# Patient Record
Sex: FEMALE | URBAN - METROPOLITAN AREA
[De-identification: names, ages, dates, MRNs, and addresses within clinical notes are randomized per-mention and may not be internally consistent; named-entity substitution may affect disease eponyms.]

---

## 2019-06-24 NOTE — Clinical Note
Doing okay on Tamoxifen. Notes intermitt right hip discomfort only. Tapering Carbamazepine on her own, has not made Neuro appointment yet, working on financial assistance. Has lost weight. Takes powder Zeolite, looks okay, but will let you decide if you want her taking this. Sees you next week, thanks Pre-Surgery Instructions:   Medication Instructions    acetaminophen (TYLENOL) 325 mg tablet Instructed patient per Anesthesia Guidelines   Multiple Vitamin (MULTIVITAMIN) tablet Instructed patient per Anesthesia Guidelines   Saw Palmetto, Serenoa repens, (SAW PALMETTO PO) Instructed patient per Anesthesia Guidelines  Per anesthesia patient was instructed to stop NSAIDS and supplements one week preop and on DOS no medications are needed  Patient is currently in [place] and will have pre-op testing done on his return to the [facility]  Next week

## 2023-04-12 ENCOUNTER — LAB REQUISITION (OUTPATIENT)
Dept: LAB | Facility: HOSPITAL | Age: 48
End: 2023-04-12
Payer: COMMERCIAL

## 2023-04-12 DIAGNOSIS — I10 ESSENTIAL (PRIMARY) HYPERTENSION: ICD-10-CM

## 2023-04-12 DIAGNOSIS — Z00.00 ENCOUNTER FOR GENERAL ADULT MEDICAL EXAMINATION WITHOUT ABNORMAL FINDINGS: ICD-10-CM

## 2023-04-12 DIAGNOSIS — R76.0 RAISED ANTIBODY TITER: ICD-10-CM

## 2023-04-12 DIAGNOSIS — E66.09 OTHER OBESITY DUE TO EXCESS CALORIES: ICD-10-CM

## 2023-04-12 DIAGNOSIS — Z79.899 OTHER LONG TERM (CURRENT) DRUG THERAPY: ICD-10-CM

## 2023-04-12 LAB
ALBUMIN SERPL-MCNC: 3.9 G/DL (ref 3.4–5)
ALBUMIN/GLOB SERPL: 1 {RATIO} (ref 1–2)
ALP LIVER SERPL-CCNC: 63 U/L
ALT SERPL-CCNC: 22 U/L
ANION GAP SERPL CALC-SCNC: 8 MMOL/L (ref 0–18)
AST SERPL-CCNC: 10 U/L (ref 15–37)
BASOPHILS # BLD AUTO: 0.04 X10(3) UL (ref 0–0.2)
BASOPHILS NFR BLD AUTO: 0.6 %
BILIRUB SERPL-MCNC: 0.5 MG/DL (ref 0.1–2)
BUN BLD-MCNC: 9 MG/DL (ref 7–18)
BUN/CREAT SERPL: 13.2 (ref 10–20)
CALCIUM BLD-MCNC: 9.2 MG/DL (ref 8.5–10.1)
CHLORIDE SERPL-SCNC: 102 MMOL/L (ref 98–112)
CHOLEST SERPL-MCNC: 238 MG/DL (ref ?–200)
CO2 SERPL-SCNC: 26 MMOL/L (ref 21–32)
CREAT BLD-MCNC: 0.68 MG/DL
DEPRECATED RDW RBC AUTO: 43.4 FL (ref 35.1–46.3)
EOSINOPHIL # BLD AUTO: 0.16 X10(3) UL (ref 0–0.7)
EOSINOPHIL NFR BLD AUTO: 2.5 %
ERYTHROCYTE [DISTWIDTH] IN BLOOD BY AUTOMATED COUNT: 12 % (ref 11–15)
ESTRADIOL SERPL-MCNC: 236.6 PG/ML
FSH SERPL-ACNC: 2.5 MIU/ML
GFR SERPLBLD BASED ON 1.73 SQ M-ARVRAT: 109 ML/MIN/1.73M2 (ref 60–?)
GLOBULIN PLAS-MCNC: 4.1 G/DL (ref 2.8–4.4)
GLUCOSE BLD-MCNC: 90 MG/DL (ref 70–99)
HCT VFR BLD AUTO: 40.8 %
HDLC SERPL-MCNC: 78 MG/DL (ref 40–59)
HGB BLD-MCNC: 13.2 G/DL
IMM GRANULOCYTES # BLD AUTO: 0.02 X10(3) UL (ref 0–1)
IMM GRANULOCYTES NFR BLD: 0.3 %
LDLC SERPL CALC-MCNC: 144 MG/DL (ref ?–100)
LH SERPL-ACNC: 3.1 MIU/ML
LYMPHOCYTES # BLD AUTO: 1.76 X10(3) UL (ref 1–4)
LYMPHOCYTES NFR BLD AUTO: 27.5 %
MCH RBC QN AUTO: 31.5 PG (ref 26–34)
MCHC RBC AUTO-ENTMCNC: 32.4 G/DL (ref 31–37)
MCV RBC AUTO: 97.4 FL
MONOCYTES # BLD AUTO: 0.47 X10(3) UL (ref 0.1–1)
MONOCYTES NFR BLD AUTO: 7.3 %
NEUTROPHILS # BLD AUTO: 3.95 X10 (3) UL (ref 1.5–7.7)
NEUTROPHILS # BLD AUTO: 3.95 X10(3) UL (ref 1.5–7.7)
NEUTROPHILS NFR BLD AUTO: 61.8 %
NONHDLC SERPL-MCNC: 160 MG/DL (ref ?–130)
OSMOLALITY SERPL CALC.SUM OF ELEC: 280 MOSM/KG (ref 275–295)
PLATELET # BLD AUTO: 337 10(3)UL (ref 150–450)
POTASSIUM SERPL-SCNC: 3.7 MMOL/L (ref 3.5–5.1)
PROT SERPL-MCNC: 8 G/DL (ref 6.4–8.2)
RBC # BLD AUTO: 4.19 X10(6)UL
RHEUMATOID FACT SERPL-ACNC: <10 IU/ML (ref ?–15)
SODIUM SERPL-SCNC: 136 MMOL/L (ref 136–145)
TRIGL SERPL-MCNC: 95 MG/DL (ref 30–149)
TSI SER-ACNC: 1.73 MIU/ML (ref 0.36–3.74)
VLDLC SERPL CALC-MCNC: 18 MG/DL (ref 0–30)
WBC # BLD AUTO: 6.4 X10(3) UL (ref 4–11)

## 2023-04-12 PROCEDURE — 83001 ASSAY OF GONADOTROPIN (FSH): CPT | Performed by: FAMILY MEDICINE

## 2023-04-12 PROCEDURE — 82670 ASSAY OF TOTAL ESTRADIOL: CPT | Performed by: FAMILY MEDICINE

## 2023-04-12 PROCEDURE — 80053 COMPREHEN METABOLIC PANEL: CPT | Performed by: FAMILY MEDICINE

## 2023-04-12 PROCEDURE — 85025 COMPLETE CBC W/AUTO DIFF WBC: CPT | Performed by: FAMILY MEDICINE

## 2023-04-12 PROCEDURE — 86431 RHEUMATOID FACTOR QUANT: CPT | Performed by: FAMILY MEDICINE

## 2023-04-12 PROCEDURE — 83002 ASSAY OF GONADOTROPIN (LH): CPT | Performed by: FAMILY MEDICINE

## 2023-04-12 PROCEDURE — 84443 ASSAY THYROID STIM HORMONE: CPT | Performed by: FAMILY MEDICINE

## 2023-04-12 PROCEDURE — 80061 LIPID PANEL: CPT | Performed by: FAMILY MEDICINE

## 2023-06-21 ENCOUNTER — LAB REQUISITION (OUTPATIENT)
Dept: LAB | Facility: HOSPITAL | Age: 48
End: 2023-06-21
Payer: COMMERCIAL

## 2023-06-21 DIAGNOSIS — M25.572 PAIN IN LEFT ANKLE AND JOINTS OF LEFT FOOT: ICD-10-CM

## 2023-06-21 DIAGNOSIS — R60.0 LOCALIZED EDEMA: ICD-10-CM

## 2023-06-21 LAB — URATE SERPL-MCNC: 5.2 MG/DL

## 2023-06-21 PROCEDURE — 84550 ASSAY OF BLOOD/URIC ACID: CPT | Performed by: FAMILY MEDICINE

## 2023-09-07 ENCOUNTER — HOSPITAL ENCOUNTER (OUTPATIENT)
Dept: MAMMOGRAPHY | Age: 48
Discharge: HOME | End: 2023-09-07
Attending: FAMILY MEDICINE
Payer: COMMERCIAL

## 2023-09-07 DIAGNOSIS — Z12.31 BREAST CANCER SCREENING BY MAMMOGRAM: ICD-10-CM

## 2023-09-07 PROCEDURE — 77067 SCR MAMMO BI INCL CAD: CPT | Performed by: FAMILY MEDICINE

## 2023-09-07 PROCEDURE — 77063 BREAST TOMOSYNTHESIS BI: CPT | Performed by: FAMILY MEDICINE

## 2023-09-15 ENCOUNTER — HOSPITAL ENCOUNTER (OUTPATIENT)
Dept: ULTRASOUND IMAGING | Facility: HOSPITAL | Age: 48
Discharge: HOME | End: 2023-09-15
Attending: FAMILY MEDICINE
Payer: COMMERCIAL

## 2023-09-15 ENCOUNTER — HOSPITAL ENCOUNTER (OUTPATIENT)
Dept: MAMMOGRAPHY | Facility: HOSPITAL | Age: 48
Discharge: HOME | End: 2023-09-15
Attending: FAMILY MEDICINE
Payer: COMMERCIAL

## 2023-09-15 DIAGNOSIS — R92.8 ABNORMAL MAMMOGRAM: ICD-10-CM

## 2023-09-15 PROCEDURE — 77061 BREAST TOMOSYNTHESIS UNI: CPT | Performed by: FAMILY MEDICINE

## 2023-09-15 PROCEDURE — 76642 ULTRASOUND BREAST LIMITED: CPT | Performed by: FAMILY MEDICINE

## 2023-09-15 PROCEDURE — 77065 DX MAMMO INCL CAD UNI: CPT | Performed by: FAMILY MEDICINE

## 2023-09-21 NOTE — DISCHARGE INSTRUCTIONS
The Doctor (Radiologist) who performed your procedure was: DR. Shannan Campbell an ice pack over the biopsy site on top of your bra or on top of the ACE wrap (never apply ice directly over skin) for 10-15 minutes of every hour until bedtime for your comfort and to decrease bleeding. Keep your sports bra or the ACE wrap (stereotactic and MRI biopsy) in place for 24 hours after your biopsy. This compression decreases bleeding and breast movement for your comfort. Wear a supportive bra for the next couple of days for comfort (sports bra for sleep). Continue to wear, preferably, a sports bra or good supportive bra for 1 week and take off only to shower. No baths or showers during the first 24 hours after biopsy. After this time you may take a shower. It's okay if the strips get wet but do not soak them. NO saunas, hot tubs or swimming until steri-strips fall off (approx. 5 days). This prevents infection and allows time for them to completely close and heal.  DO NOT remove the steri-strips. They will fall off in 5 days. If any type of irritation (redness, itching or blisters) develops in the area around the steri-strips, remove them gently. If the steri-strips do not fall off after 5 days, gently remove them. Keep the area clean and dry. It is normal to have mild discomfort and bruising at the biopsy site. You may take Tylenol as needed for discomfort, as long as you have no allergies to Tylenol. Do not take aspirin, motrin, ibuprofen or any medication containing NSAID (non-steroidal anti-inflammatory drug) product for 48 hours. DO NOT participate in strenuous activity (aerobics, heavy lifting, housework, gardening, etc.) 48 hours after your biopsy to prevent bleeding. You will receive results in 2-3 business days. If you are having an MRI breast biopsy or an Ultrasound guided breast biopsy, you will be billed for the biopsy and unilateral mammogram separately.   If you have any questions about the procedure or your results, please contact the Breast Care Coordinator Nurse at [phone]. Notify your ordering physician or primary physician for increased bleeding, pain or fever over 100. Or contact a Radiology Nurse at [phone] between 8am-4pm (after 4pm, your call will be directed to the [place] [address] Emergency Room).

## 2023-09-22 ENCOUNTER — HOSPITAL ENCOUNTER (OUTPATIENT)
Dept: ULTRASOUND IMAGING | Facility: HOSPITAL | Age: 48
Discharge: HOME | End: 2023-09-22
Attending: FAMILY MEDICINE
Payer: COMMERCIAL

## 2023-09-22 ENCOUNTER — HOSPITAL ENCOUNTER (OUTPATIENT)
Dept: MAMMOGRAPHY | Facility: HOSPITAL | Age: 48
Discharge: HOME | End: 2023-09-22
Attending: FAMILY MEDICINE
Payer: COMMERCIAL

## 2023-09-22 DIAGNOSIS — R59.9 ENLARGED LYMPH NODE: ICD-10-CM

## 2023-09-22 DIAGNOSIS — N63.20 BREAST MASS, LEFT: ICD-10-CM

## 2023-09-22 PROCEDURE — 77065 DX MAMMO INCL CAD UNI: CPT | Performed by: FAMILY MEDICINE

## 2023-09-22 PROCEDURE — 76942 ECHO GUIDE FOR BIOPSY: CPT | Performed by: FAMILY MEDICINE

## 2023-09-22 PROCEDURE — 38505 NEEDLE BIOPSY LYMPH NODES: CPT | Performed by: FAMILY MEDICINE

## 2023-09-22 PROCEDURE — 88342 IMHCHEM/IMCYTCHM 1ST ANTB: CPT | Performed by: FAMILY MEDICINE

## 2023-09-22 PROCEDURE — 88305 TISSUE EXAM BY PATHOLOGIST: CPT | Performed by: FAMILY MEDICINE

## 2023-09-22 PROCEDURE — 19083 BX BREAST 1ST LESION US IMAG: CPT | Performed by: FAMILY MEDICINE

## 2023-09-22 PROCEDURE — 88360 TUMOR IMMUNOHISTOCHEM/MANUAL: CPT | Performed by: FAMILY MEDICINE

## 2023-09-22 NOTE — PROCEDURES
[facility]  Procedure Note    Fredick Link Patient Status:  Outpatient    1975 MRN [id number]   [facility] [address] [facility] PCP No primary care provider on file. Procedure: 1. Left breast ultrasound guided biopsy  2. Left axillary lymph node ultrasound guided biopsy    Pre-Procedure Diagnosis: 1. Left breast mass  2. Left axillary lymphadenopathy    Post-Procedure Diagnosis: 1. Left breast mass  2. Left axillary lymphadenopathy    Anesthesia:  Local    Findings:  multiple cores at each site    Specimens: minimal    Blood Loss:  none    Tourniquet Time: none  Complications:  None  Drains:  none      Queta Vickers MD  2023

## 2023-09-26 ENCOUNTER — TELEPHONE (OUTPATIENT)
Dept: HEMATOLOGY/ONCOLOGY | Facility: HOSPITAL | Age: 48
End: 2023-09-26

## 2023-09-26 NOTE — TELEPHONE ENCOUNTER
Patient is s/p left breast biopsy, performed at [facility] 9/22/23. This writer spoke with Chrissy Phipps. She has discussed results with patient and referred her to Dr. Magdalena Mccullough and Dr. Cain Franklin for further management. Phoned patient and introduced myself as Breast RN Navigator. Patient is aware of referrals to Dr. Cain Franklin and Dr. Magdalena Mccullough. Discussed with patient scheduling consultations. Offered patient appointment with Dr. Cain Franklin for Friday 9/29/23 at 2pm.  Patient agreeable. Offered patient appointment with Dr. Magdalena Mccullough for Wednesday 10/4/23 at 12pm.  Patient agreeable. All appointment information provided to patient. She acknowledged and denied questions.

## 2023-09-29 ENCOUNTER — OFFICE VISIT (OUTPATIENT)
Dept: HEMATOLOGY/ONCOLOGY | Facility: HOSPITAL | Age: 48
End: 2023-09-29
Attending: INTERNAL MEDICINE
Payer: COMMERCIAL

## 2023-09-29 VITALS
OXYGEN SATURATION: 98 % | SYSTOLIC BLOOD PRESSURE: 119 MMHG | BODY MASS INDEX: 34.81 KG/M2 | WEIGHT: 216.63 LBS | DIASTOLIC BLOOD PRESSURE: 54 MMHG | HEIGHT: 66 IN | TEMPERATURE: 99 F | RESPIRATION RATE: 16 BRPM | HEART RATE: 68 BPM

## 2023-09-29 DIAGNOSIS — C50.112 MALIGNANT NEOPLASM OF CENTRAL PORTION OF LEFT BREAST IN FEMALE, ESTROGEN RECEPTOR POSITIVE: Primary | ICD-10-CM

## 2023-09-29 DIAGNOSIS — Z17.0 MALIGNANT NEOPLASM OF CENTRAL PORTION OF LEFT BREAST IN FEMALE, ESTROGEN RECEPTOR POSITIVE: Primary | ICD-10-CM

## 2023-09-29 PROCEDURE — 99205 OFFICE O/P NEW HI 60 MIN: CPT | Performed by: INTERNAL MEDICINE

## 2023-09-29 RX ORDER — LOSARTAN POTASSIUM 50 MG/1
50 TABLET ORAL DAILY
COMMUNITY
Start: 2023-09-07

## 2023-09-29 RX ORDER — CARBAMAZEPINE 200 MG/1
200 TABLET ORAL 2 TIMES DAILY
COMMUNITY
Start: 2023-09-07

## 2023-10-03 ENCOUNTER — TELEPHONE (OUTPATIENT)
Dept: HEMATOLOGY/ONCOLOGY | Facility: HOSPITAL | Age: 48
End: 2023-10-03

## 2023-10-03 DIAGNOSIS — C77.3 BREAST CANCER METASTASIZED TO AXILLARY LYMPH NODE, LEFT (HCC): ICD-10-CM

## 2023-10-03 DIAGNOSIS — C50.912 BREAST CANCER METASTASIZED TO AXILLARY LYMPH NODE, LEFT (HCC): ICD-10-CM

## 2023-10-03 DIAGNOSIS — C50.112 MALIGNANT NEOPLASM OF CENTRAL PORTION OF LEFT BREAST IN FEMALE, ESTROGEN RECEPTOR POSITIVE: Primary | ICD-10-CM

## 2023-10-03 DIAGNOSIS — Z17.0 MALIGNANT NEOPLASM OF CENTRAL PORTION OF LEFT BREAST IN FEMALE, ESTROGEN RECEPTOR POSITIVE: Primary | ICD-10-CM

## 2023-10-03 NOTE — TELEPHONE ENCOUNTER
Second attempt to reach patient. Spoke with patient. Dr. Franklin Hamman office has contacted her and rescheduled her consultation to Wednesday 10/11/23. Shared with patient feedback from Dr. Keren Tran to have scans completed prior to her consultation. Scheduled CT and Bone Scan for Thursday 10/5/23. For convenience, rescheduled genetic counseling for Thursday 10/5/23 at 12pm to follow her scans. Also shared with patient Dr. Keren Tran has ordered a breast MRI. This has been scheduled for Monday 10/9/23 at 1pm.  All information provided to patient. Patient also aware information will be in her MyChart for her reference. Patient acknowledged and denied questions.

## 2023-10-04 ENCOUNTER — APPOINTMENT (OUTPATIENT)
Dept: GENETICS | Facility: HOSPITAL | Age: 48
End: 2023-10-04
Attending: INTERNAL MEDICINE
Payer: COMMERCIAL

## 2023-10-04 ENCOUNTER — APPOINTMENT (OUTPATIENT)
Dept: HEMATOLOGY/ONCOLOGY | Facility: HOSPITAL | Age: 48
End: 2023-10-04
Attending: INTERNAL MEDICINE
Payer: COMMERCIAL

## 2023-10-05 ENCOUNTER — GENETICS ENCOUNTER (OUTPATIENT)
Dept: GENETICS | Facility: HOSPITAL | Age: 48
End: 2023-10-05
Attending: INTERNAL MEDICINE
Payer: COMMERCIAL

## 2023-10-05 ENCOUNTER — NURSE ONLY (OUTPATIENT)
Dept: HEMATOLOGY/ONCOLOGY | Facility: HOSPITAL | Age: 48
End: 2023-10-05
Attending: INTERNAL MEDICINE
Payer: COMMERCIAL

## 2023-10-05 ENCOUNTER — HOSPITAL ENCOUNTER (OUTPATIENT)
Dept: NUCLEAR MEDICINE | Facility: HOSPITAL | Age: 48
Discharge: HOME | End: 2023-10-05
Attending: INTERNAL MEDICINE
Payer: COMMERCIAL

## 2023-10-05 ENCOUNTER — HOSPITAL ENCOUNTER (OUTPATIENT)
Dept: CT IMAGING | Facility: HOSPITAL | Age: 48
Discharge: HOME | End: 2023-10-05
Attending: INTERNAL MEDICINE
Payer: COMMERCIAL

## 2023-10-05 DIAGNOSIS — Z17.0 MALIGNANT NEOPLASM OF CENTRAL PORTION OF LEFT BREAST IN FEMALE, ESTROGEN RECEPTOR POSITIVE: ICD-10-CM

## 2023-10-05 DIAGNOSIS — Z80.3 FAMILY HISTORY OF MALIGNANT NEOPLASM OF BREAST: ICD-10-CM

## 2023-10-05 DIAGNOSIS — C50.112 MALIGNANT NEOPLASM OF CENTRAL PORTION OF LEFT BREAST IN FEMALE, ESTROGEN RECEPTOR POSITIVE: Primary | ICD-10-CM

## 2023-10-05 DIAGNOSIS — Z17.0 MALIGNANT NEOPLASM OF CENTRAL PORTION OF LEFT BREAST IN FEMALE, ESTROGEN RECEPTOR POSITIVE: Primary | ICD-10-CM

## 2023-10-05 DIAGNOSIS — C50.112 MALIGNANT NEOPLASM OF CENTRAL PORTION OF LEFT BREAST IN FEMALE, ESTROGEN RECEPTOR POSITIVE: ICD-10-CM

## 2023-10-05 LAB
CREAT BLD-MCNC: 0.7 MG/DL
EGFRCR SERPLBLD CKD-EPI 2021: 107 ML/MIN/1.73M2 (ref 60–?)

## 2023-10-05 PROCEDURE — 82565 ASSAY OF CREATININE: CPT

## 2023-10-05 PROCEDURE — 78306 BONE IMAGING WHOLE BODY: CPT | Performed by: INTERNAL MEDICINE

## 2023-10-05 PROCEDURE — 71260 CT THORAX DX C+: CPT | Performed by: INTERNAL MEDICINE

## 2023-10-05 PROCEDURE — 74177 CT ABD & PELVIS W/CONTRAST: CPT | Performed by: INTERNAL MEDICINE

## 2023-10-05 PROCEDURE — 36415 COLL VENOUS BLD VENIPUNCTURE: CPT

## 2023-10-05 PROCEDURE — 96040 HC GENETIC COUNSELING EA 30 MIN: CPT | Performed by: GENETIC COUNSELOR, MS

## 2023-10-09 ENCOUNTER — HOSPITAL ENCOUNTER (OUTPATIENT)
Dept: MRI IMAGING | Facility: HOSPITAL | Age: 48
Discharge: HOME | End: 2023-10-09
Attending: SURGERY
Payer: COMMERCIAL

## 2023-10-09 DIAGNOSIS — C50.112 MALIGNANT NEOPLASM OF CENTRAL PORTION OF LEFT BREAST IN FEMALE, ESTROGEN RECEPTOR POSITIVE: ICD-10-CM

## 2023-10-09 DIAGNOSIS — Z17.0 MALIGNANT NEOPLASM OF CENTRAL PORTION OF LEFT BREAST IN FEMALE, ESTROGEN RECEPTOR POSITIVE: ICD-10-CM

## 2023-10-09 DIAGNOSIS — C77.3 BREAST CANCER METASTASIZED TO AXILLARY LYMPH NODE, LEFT (HCC): ICD-10-CM

## 2023-10-09 DIAGNOSIS — C50.912 BREAST CANCER METASTASIZED TO AXILLARY LYMPH NODE, LEFT (HCC): ICD-10-CM

## 2023-10-09 PROCEDURE — A9575 INJ GADOTERATE MEGLUMI 0.1ML: HCPCS | Performed by: SURGERY

## 2023-10-09 PROCEDURE — 77049 MRI BREAST C-+ W/CAD BI: CPT | Performed by: SURGERY

## 2023-10-09 RX ORDER — GADOTERATE MEGLUMINE 376.9 MG/ML
20 INJECTION INTRAVENOUS
Status: COMPLETED | OUTPATIENT
Start: 2023-10-09 | End: 2023-10-09

## 2023-10-09 RX ADMIN — GADOTERATE MEGLUMINE 20 ML: 376.9 INJECTION INTRAVENOUS at 13:54:00

## 2023-10-11 ENCOUNTER — OFFICE VISIT (OUTPATIENT)
Dept: SURGERY | Facility: CLINIC | Age: 48
End: 2023-10-11
Payer: COMMERCIAL

## 2023-10-11 VITALS
HEIGHT: 66 IN | TEMPERATURE: 97 F | SYSTOLIC BLOOD PRESSURE: 124 MMHG | WEIGHT: 213.81 LBS | OXYGEN SATURATION: 2 % | RESPIRATION RATE: 16 BRPM | DIASTOLIC BLOOD PRESSURE: 69 MMHG | HEART RATE: 70 BPM | BODY MASS INDEX: 34.36 KG/M2

## 2023-10-11 DIAGNOSIS — C50.112 MALIGNANT NEOPLASM OF CENTRAL PORTION OF LEFT BREAST IN FEMALE, ESTROGEN RECEPTOR POSITIVE: Primary | ICD-10-CM

## 2023-10-11 DIAGNOSIS — Z80.3 FAMILY HISTORY OF MALIGNANT NEOPLASM OF BREAST: ICD-10-CM

## 2023-10-11 DIAGNOSIS — Z17.0 MALIGNANT NEOPLASM OF CENTRAL PORTION OF LEFT BREAST IN FEMALE, ESTROGEN RECEPTOR POSITIVE: Primary | ICD-10-CM

## 2023-10-11 PROCEDURE — 3078F DIAST BP <80 MM HG: CPT | Performed by: SURGERY

## 2023-10-11 PROCEDURE — 99205 OFFICE O/P NEW HI 60 MIN: CPT | Performed by: SURGERY

## 2023-10-11 PROCEDURE — 3074F SYST BP LT 130 MM HG: CPT | Performed by: SURGERY

## 2023-10-11 PROCEDURE — 3008F BODY MASS INDEX DOCD: CPT | Performed by: SURGERY

## 2023-10-15 ENCOUNTER — TELEPHONE (OUTPATIENT)
Dept: GENETICS | Facility: HOSPITAL | Age: 48
End: 2023-10-15

## 2023-10-15 NOTE — TELEPHONE ENCOUNTER
Shared NEGATIVE genetic testing results for 9 gene breast-focused STAT panel with Katey Luther ([facility] Breast Cancer STAT Panel with STEPHANIE and CHEK2). She was pleased to hear these results. Released to her through lab's portal and will mail her a copy. She is considering a reflex for other genes but will make a decision about this following her surgery. All her questions were answered to the best of my ability and she was appreciative of the call.

## 2023-10-16 ENCOUNTER — TELEPHONE (OUTPATIENT)
Dept: SURGERY | Facility: CLINIC | Age: 48
End: 2023-10-16

## 2023-10-16 ENCOUNTER — DOCUMENTATION ONLY (OUTPATIENT)
Dept: SURGERY | Facility: CLINIC | Age: 48
End: 2023-10-16

## 2023-10-16 DIAGNOSIS — C50.112 MALIGNANT NEOPLASM OF CENTRAL PORTION OF LEFT BREAST IN FEMALE, ESTROGEN RECEPTOR POSITIVE: Primary | ICD-10-CM

## 2023-10-16 DIAGNOSIS — Z17.0 MALIGNANT NEOPLASM OF CENTRAL PORTION OF LEFT BREAST IN FEMALE, ESTROGEN RECEPTOR POSITIVE: Primary | ICD-10-CM

## 2023-10-16 NOTE — TELEPHONE ENCOUNTER
Spoke with patient regarding surgical decision between lumpectomy and mastectomy in light of breast MRI and genetics results. Patient would like to proceed with lumpectomy. Discussed axillary lymph node dissection with patient as well. Patient is aware that she will have surgical drain post operatively. Patient aware that the office will call her with surgical date and drain teaching class. Patient appreciative of the call.

## 2023-10-16 NOTE — PATIENT INSTRUCTIONS
Dr. Arcelia Matta  Tel: [phone]  Fax: [address] [place], [place] [address]  [phone]     Surgery/Procedure: Left breast wire localized lumpectomy, left breast axillary lymph node dissection       Anesthesia:   Gen  Surgery Length:   1.5 hours CPT:  53050, 28210   Wire LOC:   Yes Nuc Med:   No Jayla Seed:  No       Dx & ICD-10: Malignant neoplasm of central portion of left breast in female, estrogen receptor positive (C50.112, Z17.0). Radiology Instructions: Left breast, 12 o'clock position, 2 cm from the nipple, vision shaped clip, biopsy demonstrates invasive ductal carcinoma.     _______________________________________________________________________________    Someone must accompany you the day of the procedure to drive you home safely, because of anesthesia. You will need an adult  to stay with you the first night following your surgery. You must remove any kind of makeup, acrylic nails, lotions, powders, creams or deodorant. [place] ONLY: Pre-admission will give instruct you on when to take Gatorade and Tylenol/acetaminophen prior to your surgery, purchase 2 - 12oz bottles of regular Gatorade (NOT RED/SUGAR FREE). Otherwise, you may not eat or drink anything else after 11PM the night before surgery. [place] ONLY: You may not eat or drink anything after midnight the day of your surgery. Wear comfortable clothing that can be easily removed. If you wear dentures, contacts lenses, or any prosthesis, you will be asked to remove them. Do not drink alcohol or smoke 24 hours prior to your procedure. Bring a picture ID and your insurance card. Covid-19 testing is no longer required before surgery unless you are experiencing symptoms such as fever, cough, congestion, etc.   The Pre-Admission Testing Department will call the day before to confirm your procedure, give you the time you need to arrive by and directions on where to go.  They begin making calls after 2pm, if you are not contacted by 4pm, please call the surgeon's office listed above. Do not take any blood thinners at least one week prior to the procedure/surgery. This includes aspirin, baby aspirin, Ibuprofen products, herbal supplements, diet medications, vitamin E, fish oil and green tea supplements. Please check other supplements for these ingredients. *TYLENOL or acetaminophen is acceptable*  If you take Coumadin, Plavix, Xarelto, or Eliquis, please contact your prescribing physician for special instructions on how long to hold. If you take insulin contact your primary care physician for special instructions. Our surgery scheduler, Tomas Bansal, will be contacting you to discuss surgery dates. If you have any questions related to scheduling your surgery, please reach out to her at [phone].  _____________________________________________________________________  PRE-OPERATIVE TESTING IF INDICATED BELOW  PLEASE COMPLETE ASAP (AT LEAST 14-21 DAYS PRIOR TO SURGERY)  [] CBC [x] BMP [] CMP [x] EKG    [] PT, PTT, INR [] Cardiac Clearance  [x] H&P Medical Clearance [] Chest X-ray     Please call Central Scheduling to schedule an appointment for pre-operative labs/tests @ [phone]  Does the patient have a pacemaker or ICD? Does the patient have sleep apnea?   [] Yes   [x] No                               [] Yes   [x] No

## 2023-10-17 ENCOUNTER — TELEPHONE (OUTPATIENT)
Dept: SURGERY | Facility: CLINIC | Age: 48
End: 2023-10-17

## 2023-10-17 DIAGNOSIS — Z17.0 MALIGNANT NEOPLASM OF CENTRAL PORTION OF LEFT BREAST IN FEMALE, ESTROGEN RECEPTOR POSITIVE: Primary | ICD-10-CM

## 2023-10-17 DIAGNOSIS — C50.112 MALIGNANT NEOPLASM OF CENTRAL PORTION OF LEFT BREAST IN FEMALE, ESTROGEN RECEPTOR POSITIVE: Primary | ICD-10-CM

## 2023-10-17 NOTE — TELEPHONE ENCOUNTER
Calling pt in regards to scheduling surgery. Informed pt that I have 11/06/2023 available at [facility] with Dr. Maral Barrera. Pt verbalized understanding and in agreement with date and location. All questions answered. Encouraged pt to call or [facility] message office with any other questions or concerns.

## 2023-10-23 ENCOUNTER — LAB REQUISITION (OUTPATIENT)
Dept: LAB | Facility: HOSPITAL | Age: 48
End: 2023-10-23

## 2023-10-23 DIAGNOSIS — D05.81 OTHER SPECIFIED TYPE OF CARCINOMA IN SITU OF RIGHT BREAST: ICD-10-CM

## 2023-10-23 DIAGNOSIS — N92.5 OTHER SPECIFIED IRREGULAR MENSTRUATION: ICD-10-CM

## 2023-10-23 DIAGNOSIS — Z01.818 ENCOUNTER FOR OTHER PREPROCEDURAL EXAMINATION: ICD-10-CM

## 2023-10-23 LAB
ANION GAP SERPL CALC-SCNC: 8 MMOL/L (ref 0–18)
BUN BLD-MCNC: 8 MG/DL (ref 7–18)
BUN/CREAT SERPL: 10.5 (ref 10–20)
CALCIUM BLD-MCNC: 9.1 MG/DL (ref 8.5–10.1)
CHLORIDE SERPL-SCNC: 106 MMOL/L (ref 98–112)
CO2 SERPL-SCNC: 26 MMOL/L (ref 21–32)
CREAT BLD-MCNC: 0.76 MG/DL
EGFRCR SERPLBLD CKD-EPI 2021: 97 ML/MIN/1.73M2 (ref 60–?)
ESTRADIOL SERPL-MCNC: 26.7 PG/ML
FSH SERPL-ACNC: 13.5 MIU/ML
GLUCOSE BLD-MCNC: 93 MG/DL (ref 70–99)
LH SERPL-ACNC: 8.4 MIU/ML
OSMOLALITY SERPL CALC.SUM OF ELEC: 288 MOSM/KG (ref 275–295)
POTASSIUM SERPL-SCNC: 4.3 MMOL/L (ref 3.5–5.1)
SODIUM SERPL-SCNC: 140 MMOL/L (ref 136–145)

## 2023-10-23 PROCEDURE — 83001 ASSAY OF GONADOTROPIN (FSH): CPT | Performed by: FAMILY MEDICINE

## 2023-10-23 PROCEDURE — 80048 BASIC METABOLIC PNL TOTAL CA: CPT | Performed by: FAMILY MEDICINE

## 2023-10-23 PROCEDURE — 82670 ASSAY OF TOTAL ESTRADIOL: CPT | Performed by: FAMILY MEDICINE

## 2023-10-23 PROCEDURE — 83002 ASSAY OF GONADOTROPIN (LH): CPT | Performed by: FAMILY MEDICINE

## 2023-10-24 ENCOUNTER — TELEPHONE (OUTPATIENT)
Dept: HEMATOLOGY/ONCOLOGY | Facility: HOSPITAL | Age: 48
End: 2023-10-24

## 2023-10-24 NOTE — TELEPHONE ENCOUNTER
Phoned patient for care coordination. Patient is scheduled for left breast wire localized lumpectomy and axillary lymph node dissection. Discussed with patient scheduling for drain education and Physical Therapy assessment. Appointment for education scheduled for Monday Oct 30th at 2pm in the [facility]. Patient shares need for later afternoon appointments. Will reach out to Physical Therapy for appointment availability. Patient acknowledged and was appreciative of call.

## 2023-10-25 ENCOUNTER — TELEPHONE (OUTPATIENT)
Dept: HEMATOLOGY/ONCOLOGY | Facility: HOSPITAL | Age: 48
End: 2023-10-25

## 2023-10-25 ENCOUNTER — OFFICE VISIT (OUTPATIENT)
Dept: PHYSICAL THERAPY | Facility: HOSPITAL | Age: 48
End: 2023-10-25
Attending: FAMILY MEDICINE

## 2023-10-25 NOTE — PROGRESS NOTES
BREAST CANCER SURGICAL SCREENINGS  [facility]      PATIENT SUMMARY:     Involved Side:       Left                                              Dominant Hand:  right but uses both                                Occupation:                       Prior Level of Function:   no issues                                       Social Activities:                                                            Pertinent PMH:            HISTORY:  Past Medical History:   Diagnosis Date    Hypertension     Trigeminal neuralgia of left side of face       Past Surgical History:   Procedure Laterality Date    NEEDLE BIOPSY RIGHT Right     bx done at age 24yrs old          x2                                                                   PCP, Surgeon, and Oncologist:         Ramon Garcia                                                    Surgery Type and Date:           2023 L breast lumpectomy                                               # Nodes + / # Nodes Removed:      Left ALND                                                                   Chemotherapy and/or Radiation:       ? OBJECTIVE MEASUREMENTS:   10/25/2023  -discussed to start exercises 1 week after surgery.   Limit arm motion LUE to shoulder height when drains are in.                   Pre-surgical screening date: 10/25/2023    Post-surgical screening date:     Post-surgical screening date:      Pain scale:      Pain scale:      Pain scale:      Screening Therapist: DB    Screening Therapist:     Screening Therapist:                                                                                        Right Left    Right Left    Right Left   Shoulder  A/AAROM A/AAROM  Shoulder  A/AAROM A/AAROM  Shoulder  A/AAROM A/AAROM   Supine flex [facility] WFL  Supine flex    Supine flex      abd WFL WFL   abd     abd      ER     ER     ER      IR     IR     IR Sitting flex 160 160  Sitting flex    Sitting flex      abd WFL WFL   abd     abd      ext [facility]   ext     ext     Functional IR     Functional IR     Functional IR                      Shoulder strength  Right Left  Shoulder strength  Right Left  Shoulder strength  Right Left    flex 5 5   flex     flex      abd 5 5   abd     abd      ext 5 5   ext     ext      ER 5 5   ER     ER      IR 5 5   IR     IR                     Posture:     Posture:     Posture:                      Cording (grade 0-3):     Cording (grade 0-3):     Cording (grade 0-3):      R: 0    R:     R:      L: 0    L:     L:      Miscellaneous:      Miscellaneous:      Miscellaneous:                                                                       Any feelings of heaviness or tightness, swelling, redness, and/or heat in the at-risk arm, breast, chest, or truncal area? none    Any feelings of heaviness or tightness, swelling, redness, and/or heat in the at-risk arm, breast, chest, or truncal area? Any feelings of heaviness or tightness, swelling, redness, and/or heat in the at-risk arm, breast, chest, or truncal area?                                       Arm Volume     Arm Volume     Arm Volume              10/25/2023     3:00 PM   Lymphedema Calculations   DATE MEASURED 10/25/2023   LOCATION/MEASUREMENTS LUE   PATIENT POSITION  supine 1 pillow   LIMB POSITION 75 deg abduction   STARTING POINT 17cm from 3rd cuticle   RIGHT HAND VOLUME 21.7cm across palm   LEFT HAND VOLUME 21.7cm across palm   MEASUREMENT A 16.5   MEASUREMENT B 20.2   MEASUREMENT C 24.7   MEASUREMENT D 27.8   MEASUREMENT E 28.1   MEASUREMENT F 31   MEASUREMENT G 32.8   MEASUREMENT H 36   MEASUREMENT I 41.2    TOTAL VOLUME  2822.28252   DATE MEASURED 10/25/2023   LOCATION/MEASUREMENTS RUE   MEASUREMENT A 17   MEASUREMENT B 20.6   MEASUREMENT C 24.8   MEASUREMENT D 27.7   MEASUREMENT E 28.2   MEASUREMENT F 30.5   MEASUREMENT G 32.6   MEASUREMENT H 35.8   MEASUREMENT I 39. 8    TOTAL VOLUME  2705.27211   % DIFFERENCE 1.2091

## 2023-10-25 NOTE — TELEPHONE ENCOUNTER
Phoned patient regarding pre-surgery Physical Therapy evaluation. Offered appointment for today at 3pm.  Patient agreeable. All information provided. Patient acknowledged and denied questions.

## 2023-10-27 ENCOUNTER — TELEPHONE (OUTPATIENT)
Dept: SURGERY | Facility: CLINIC | Age: 48
End: 2023-10-27

## 2023-10-27 NOTE — TELEPHONE ENCOUNTER
Received pre op BMP and EKG from Dr Crocker Flight office, faxed to [facility]. Notified Dr Khalil Lizama office that H&P clearance note was not complete. Awaiting final H&P clearance note.

## 2023-10-30 ENCOUNTER — OFFICE VISIT (OUTPATIENT)
Dept: HEMATOLOGY/ONCOLOGY | Facility: HOSPITAL | Age: 48
End: 2023-10-30
Attending: INTERNAL MEDICINE
Payer: COMMERCIAL

## 2023-10-30 DIAGNOSIS — Z17.0 MALIGNANT NEOPLASM OF CENTRAL PORTION OF LEFT BREAST IN FEMALE, ESTROGEN RECEPTOR POSITIVE: Primary | ICD-10-CM

## 2023-10-30 DIAGNOSIS — Z71.9 ENCOUNTER FOR HEALTH EDUCATION: ICD-10-CM

## 2023-10-30 DIAGNOSIS — C50.112 MALIGNANT NEOPLASM OF CENTRAL PORTION OF LEFT BREAST IN FEMALE, ESTROGEN RECEPTOR POSITIVE: Primary | ICD-10-CM

## 2023-10-30 PROCEDURE — 99211 OFF/OP EST MAY X REQ PHY/QHP: CPT

## 2023-10-30 NOTE — PROGRESS NOTES
Patient presents to the [facility] for preoperative education. She is unaccompanied for her appointment today. Patient is scheduled on 11/6/23 for left breast wire localized lumpectomy and left axillary lymph node dissection with Dr. Maral Barrera. Pink compression bra demonstrated to patient. This included where she can adjust this for comfort as well as drain management system. Demonstrated to patient where she can expect her IRENE drain incision to be located. Showed to patient IRENE dressing of biopatch and tegaderm. Instructed patient to always have compression bra in place unless performing hygiene. Patient aware she will have a chest incision that will have a surgical dressing of gauze and ABD. Her incisions will be covered with steri strips. Instructed patient to leave steri strips in place. Educated as to what to expect from IRENE drain. This included color and volume of drainage, and how volume should slowly decrease and color change over time. Shared with patient to expect blood clots to also be present. Shared with patient not to expect each drain to produce equal amounts of drainage. Educated patient as to IRENE drain care. Care should be performed BID and prn dependent on volume of output. Patient should gather needed equipment to include measurement cups, alcohol swabs, drain record, and witting utensil. Instructed patient to perform hand hygiene prior to performing drain care. Demonstrated to patient stripping of tubing, emptying of drain, measurement of drainage and documentation. This skill was demonstrated back to this writer by patient successfully. Discussed troubleshooting the drain as well as when to call MD office. Provided patient with measurement cups, alcohol swabs, written IRENE drain instructions, video link, as well as drain record. Educated patient as to showering. Instructed patient she may shower 24-48 hrs after her surgery.   Encouraged patient to perform IRENE drain care prior to showering. Provided lanyard for management of drain while showering. Patient to remove compression bra and surgical dressing. Surgical dressing can be discarded. IRENE drain dressing should be examined and reinforced if needed. Instructed patient no scrubbing over her incision while in the shower. Once shower completed, she should pat dry. Compression bra needs to be reapplied as well as gauze dressing to her comfort. Provided patient with gauze, ABD and tegaderm dressings for home management. Patient has met with Physical Therapy and is aware of activity restrictions and arm exercise program.     Patient has follow up scheduled with Surgical Oncology. Emotional support provided to patient. Encouraged her to call with questions, concerns, or needs.

## 2023-11-02 ENCOUNTER — TELEPHONE (OUTPATIENT)
Dept: SURGERY | Facility: CLINIC | Age: 48
End: 2023-11-02

## 2023-11-02 NOTE — TELEPHONE ENCOUNTER
Contacted Dr Jonna Robledo office regarding H&P medical clearance note. Office will fax over complete note. Office fax number provided.

## 2023-11-06 ENCOUNTER — HOSPITAL ENCOUNTER (OUTPATIENT)
Dept: MAMMOGRAPHY | Facility: HOSPITAL | Age: 48
Discharge: HOME | End: 2023-11-06
Attending: SURGERY
Payer: COMMERCIAL

## 2023-11-06 ENCOUNTER — ANESTHESIA EVENT (OUTPATIENT)
Dept: SURGERY | Facility: HOSPITAL | Age: 48
End: 2023-11-06
Payer: COMMERCIAL

## 2023-11-06 ENCOUNTER — APPOINTMENT (OUTPATIENT)
Dept: MAMMOGRAPHY | Facility: HOSPITAL | Age: 48
End: 2023-11-06
Attending: SURGERY
Payer: COMMERCIAL

## 2023-11-06 ENCOUNTER — ANESTHESIA (OUTPATIENT)
Dept: SURGERY | Facility: HOSPITAL | Age: 48
End: 2023-11-06
Payer: COMMERCIAL

## 2023-11-06 ENCOUNTER — HOSPITAL ENCOUNTER (OUTPATIENT)
Facility: HOSPITAL | Age: 48
Setting detail: HOSPITAL OUTPATIENT SURGERY
Discharge: HOME | End: 2023-11-06
Attending: SURGERY | Admitting: SURGERY
Payer: COMMERCIAL

## 2023-11-06 VITALS
WEIGHT: 210 LBS | OXYGEN SATURATION: 98 % | HEIGHT: 66 IN | TEMPERATURE: 98 F | SYSTOLIC BLOOD PRESSURE: 127 MMHG | RESPIRATION RATE: 16 BRPM | DIASTOLIC BLOOD PRESSURE: 68 MMHG | BODY MASS INDEX: 33.75 KG/M2 | HEART RATE: 66 BPM

## 2023-11-06 DIAGNOSIS — Z17.0 MALIGNANT NEOPLASM OF CENTRAL PORTION OF LEFT BREAST IN FEMALE, ESTROGEN RECEPTOR POSITIVE: Primary | ICD-10-CM

## 2023-11-06 DIAGNOSIS — Z17.0 MALIGNANT NEOPLASM OF CENTRAL PORTION OF LEFT BREAST IN FEMALE, ESTROGEN RECEPTOR POSITIVE: ICD-10-CM

## 2023-11-06 DIAGNOSIS — C50.112 MALIGNANT NEOPLASM OF CENTRAL PORTION OF LEFT BREAST IN FEMALE, ESTROGEN RECEPTOR POSITIVE: ICD-10-CM

## 2023-11-06 DIAGNOSIS — C50.112 MALIGNANT NEOPLASM OF CENTRAL PORTION OF LEFT BREAST IN FEMALE, ESTROGEN RECEPTOR POSITIVE: Primary | ICD-10-CM

## 2023-11-06 LAB — B-HCG UR QL: NEGATIVE

## 2023-11-06 PROCEDURE — 0HBU0ZZ EXCISION OF LEFT BREAST, OPEN APPROACH: ICD-10-PCS | Performed by: SURGERY

## 2023-11-06 PROCEDURE — 0JX60ZB TRANSFER CHEST SUBCUTANEOUS TISSUE AND FASCIA WITH SKIN AND SUBCUTANEOUS TISSUE, OPEN APPROACH: ICD-10-PCS | Performed by: SURGERY

## 2023-11-06 PROCEDURE — 76098 X-RAY EXAM SURGICAL SPECIMEN: CPT | Performed by: SURGERY

## 2023-11-06 PROCEDURE — 81025 URINE PREGNANCY TEST: CPT

## 2023-11-06 PROCEDURE — 88300 SURGICAL PATH GROSS: CPT | Performed by: SURGERY

## 2023-11-06 PROCEDURE — 88360 TUMOR IMMUNOHISTOCHEM/MANUAL: CPT | Performed by: SURGERY

## 2023-11-06 PROCEDURE — 07B60ZX EXCISION OF LEFT AXILLARY LYMPHATIC, OPEN APPROACH, DIAGNOSTIC: ICD-10-PCS | Performed by: SURGERY

## 2023-11-06 PROCEDURE — 19281 PERQ DEVICE BREAST 1ST IMAG: CPT | Performed by: SURGERY

## 2023-11-06 PROCEDURE — 88307 TISSUE EXAM BY PATHOLOGIST: CPT | Performed by: SURGERY

## 2023-11-06 RX ORDER — EPHEDRINE SULFATE 50 MG/ML
INJECTION INTRAVENOUS AS NEEDED
Status: DISCONTINUED | OUTPATIENT
Start: 2023-11-06 | End: 2023-11-06 | Stop reason: SURG

## 2023-11-06 RX ORDER — ONDANSETRON 2 MG/ML
4 INJECTION INTRAMUSCULAR; INTRAVENOUS EVERY 6 HOURS PRN
Status: DISCONTINUED | OUTPATIENT
Start: 2023-11-06 | End: 2023-11-06

## 2023-11-06 RX ORDER — NALOXONE HYDROCHLORIDE 0.4 MG/ML
0.08 INJECTION, SOLUTION INTRAMUSCULAR; INTRAVENOUS; SUBCUTANEOUS AS NEEDED
Status: DISCONTINUED | OUTPATIENT
Start: 2023-11-06 | End: 2023-11-06

## 2023-11-06 RX ORDER — MIDAZOLAM HYDROCHLORIDE 1 MG/ML
INJECTION INTRAMUSCULAR; INTRAVENOUS AS NEEDED
Status: DISCONTINUED | OUTPATIENT
Start: 2023-11-06 | End: 2023-11-06 | Stop reason: SURG

## 2023-11-06 RX ORDER — MORPHINE SULFATE 4 MG/ML
2 INJECTION, SOLUTION INTRAMUSCULAR; INTRAVENOUS EVERY 10 MIN PRN
Status: DISCONTINUED | OUTPATIENT
Start: 2023-11-06 | End: 2023-11-06

## 2023-11-06 RX ORDER — LABETALOL HYDROCHLORIDE 5 MG/ML
5 INJECTION, SOLUTION INTRAVENOUS EVERY 5 MIN PRN
Status: DISCONTINUED | OUTPATIENT
Start: 2023-11-06 | End: 2023-11-06

## 2023-11-06 RX ORDER — LIDOCAINE HYDROCHLORIDE 10 MG/ML
INJECTION, SOLUTION EPIDURAL; INFILTRATION; INTRACAUDAL; PERINEURAL AS NEEDED
Status: DISCONTINUED | OUTPATIENT
Start: 2023-11-06 | End: 2023-11-06 | Stop reason: SURG

## 2023-11-06 RX ORDER — HYDROMORPHONE HYDROCHLORIDE 1 MG/ML
0.2 INJECTION, SOLUTION INTRAMUSCULAR; INTRAVENOUS; SUBCUTANEOUS EVERY 5 MIN PRN
Status: DISCONTINUED | OUTPATIENT
Start: 2023-11-06 | End: 2023-11-06

## 2023-11-06 RX ORDER — SODIUM CHLORIDE, SODIUM LACTATE, POTASSIUM CHLORIDE, CALCIUM CHLORIDE 600; 310; 30; 20 MG/100ML; MG/100ML; MG/100ML; MG/100ML
INJECTION, SOLUTION INTRAVENOUS CONTINUOUS
Status: DISCONTINUED | OUTPATIENT
Start: 2023-11-06 | End: 2023-11-06

## 2023-11-06 RX ORDER — HYDROMORPHONE HYDROCHLORIDE 1 MG/ML
0.6 INJECTION, SOLUTION INTRAMUSCULAR; INTRAVENOUS; SUBCUTANEOUS EVERY 5 MIN PRN
Status: DISCONTINUED | OUTPATIENT
Start: 2023-11-06 | End: 2023-11-06

## 2023-11-06 RX ORDER — HYDROCODONE BITARTRATE AND ACETAMINOPHEN 5; 325 MG/1; MG/1
1-2 TABLET ORAL EVERY 6 HOURS PRN
Qty: 30 TABLET | Refills: 0 | Status: SHIPPED | OUTPATIENT
Start: 2023-11-06

## 2023-11-06 RX ORDER — HYDROCODONE BITARTRATE AND ACETAMINOPHEN 5; 325 MG/1; MG/1
1 TABLET ORAL ONCE AS NEEDED
Status: COMPLETED | OUTPATIENT
Start: 2023-11-06 | End: 2023-11-06

## 2023-11-06 RX ORDER — METOCLOPRAMIDE 10 MG/1
10 TABLET ORAL ONCE
Status: COMPLETED | OUTPATIENT
Start: 2023-11-06 | End: 2023-11-06

## 2023-11-06 RX ORDER — HYDROMORPHONE HYDROCHLORIDE 1 MG/ML
0.4 INJECTION, SOLUTION INTRAMUSCULAR; INTRAVENOUS; SUBCUTANEOUS EVERY 5 MIN PRN
Status: DISCONTINUED | OUTPATIENT
Start: 2023-11-06 | End: 2023-11-06

## 2023-11-06 RX ORDER — LIDOCAINE HYDROCHLORIDE AND EPINEPHRINE 10; 10 MG/ML; UG/ML
INJECTION, SOLUTION INFILTRATION; PERINEURAL AS NEEDED
Status: DISCONTINUED | OUTPATIENT
Start: 2023-11-06 | End: 2023-11-06 | Stop reason: HOSPADM

## 2023-11-06 RX ORDER — MORPHINE SULFATE 4 MG/ML
4 INJECTION, SOLUTION INTRAMUSCULAR; INTRAVENOUS EVERY 10 MIN PRN
Status: DISCONTINUED | OUTPATIENT
Start: 2023-11-06 | End: 2023-11-06

## 2023-11-06 RX ORDER — DEXAMETHASONE SODIUM PHOSPHATE 4 MG/ML
VIAL (ML) INJECTION AS NEEDED
Status: DISCONTINUED | OUTPATIENT
Start: 2023-11-06 | End: 2023-11-06 | Stop reason: SURG

## 2023-11-06 RX ORDER — ONDANSETRON 2 MG/ML
INJECTION INTRAMUSCULAR; INTRAVENOUS AS NEEDED
Status: DISCONTINUED | OUTPATIENT
Start: 2023-11-06 | End: 2023-11-06 | Stop reason: SURG

## 2023-11-06 RX ORDER — FAMOTIDINE 20 MG/1
20 TABLET, FILM COATED ORAL ONCE
Status: COMPLETED | OUTPATIENT
Start: 2023-11-06 | End: 2023-11-06

## 2023-11-06 RX ORDER — ACETAMINOPHEN 500 MG
1000 TABLET ORAL ONCE
Status: COMPLETED | OUTPATIENT
Start: 2023-11-06 | End: 2023-11-06

## 2023-11-06 RX ORDER — MORPHINE SULFATE 10 MG/ML
6 INJECTION, SOLUTION INTRAMUSCULAR; INTRAVENOUS EVERY 10 MIN PRN
Status: DISCONTINUED | OUTPATIENT
Start: 2023-11-06 | End: 2023-11-06

## 2023-11-06 RX ORDER — BUPIVACAINE HYDROCHLORIDE 5 MG/ML
INJECTION, SOLUTION EPIDURAL; INTRACAUDAL AS NEEDED
Status: DISCONTINUED | OUTPATIENT
Start: 2023-11-06 | End: 2023-11-06 | Stop reason: HOSPADM

## 2023-11-06 RX ORDER — PROCHLORPERAZINE EDISYLATE 5 MG/ML
5 INJECTION INTRAMUSCULAR; INTRAVENOUS EVERY 8 HOURS PRN
Status: DISCONTINUED | OUTPATIENT
Start: 2023-11-06 | End: 2023-11-06

## 2023-11-06 RX ORDER — CEFAZOLIN SODIUM/WATER 2 G/20 ML
2 SYRINGE (ML) INTRAVENOUS ONCE
Status: COMPLETED | OUTPATIENT
Start: 2023-11-06 | End: 2023-11-06

## 2023-11-06 RX ADMIN — LIDOCAINE HYDROCHLORIDE 50 MG: 10 INJECTION, SOLUTION EPIDURAL; INFILTRATION; INTRACAUDAL; PERINEURAL at 14:17:00

## 2023-11-06 RX ADMIN — ONDANSETRON 4 MG: 2 INJECTION INTRAMUSCULAR; INTRAVENOUS at 14:17:00

## 2023-11-06 RX ADMIN — EPHEDRINE SULFATE 10 MG: 50 INJECTION INTRAVENOUS at 15:05:00

## 2023-11-06 RX ADMIN — DEXAMETHASONE SODIUM PHOSPHATE 4 MG: 4 MG/ML VIAL (ML) INJECTION at 14:17:00

## 2023-11-06 RX ADMIN — SODIUM CHLORIDE, SODIUM LACTATE, POTASSIUM CHLORIDE, CALCIUM CHLORIDE: 600; 310; 30; 20 INJECTION, SOLUTION INTRAVENOUS at 14:12:00

## 2023-11-06 RX ADMIN — SODIUM CHLORIDE, SODIUM LACTATE, POTASSIUM CHLORIDE, CALCIUM CHLORIDE: 600; 310; 30; 20 INJECTION, SOLUTION INTRAVENOUS at 15:15:00

## 2023-11-06 RX ADMIN — CEFAZOLIN SODIUM/WATER 2 G: 2 G/20 ML SYRINGE (ML) INTRAVENOUS at 14:23:00

## 2023-11-06 RX ADMIN — EPHEDRINE SULFATE 10 MG: 50 INJECTION INTRAVENOUS at 14:41:00

## 2023-11-06 RX ADMIN — MIDAZOLAM HYDROCHLORIDE 2 MG: 1 INJECTION INTRAMUSCULAR; INTRAVENOUS at 14:17:00

## 2023-11-06 NOTE — IMAGING NOTE
26 Pt  to mammography department scouts completed by antonina  mammography technologist     [id number] Hx taken procedure explained questions answered. Iv patent no redness or swelling noted at site     1215  Consent signed and verified      1227 Dr Rubio Roldan here scanning completed Order verified and signed by all  procedural staff members    [phone]  Time out taken       site marked LEFT  Breast    [address]  506 [facility] PT'S AXILLARY LYMPH NODE IN ADDITION TO HER LEFT BREAST 619 [address]. DR Rubio Roldan DISCUSSED BY PHONE WITH JULIANNE [address] [place] [address] APRN  NO NEEDLE WIRE LOCALIZATION NEEDED FOR LEFT AXILLARY LYMPH NODE ONLY THE LEFT BREAST PER Taye Valle APRN. 1238  Chloro prep as skin prep to site. Lidocaine   1% 10 milligrams per ml given as anesthetic affect from kit  3 ml total given. 1239 Nikaiatas needle  20 gauge  X 5 cm   placed . Pt re  images procedure complete. 1241 BB marker to site wire secured to breast  strips. A 4x4 dsg secured  over wire with tape by nurse  [facility] RN  after images completed . Carlita Alarcon 1255 To 1355 [facility] department .

## 2023-11-06 NOTE — ANESTHESIA PROCEDURE NOTES
Airway  Date/Time: 11/6/2023 2:19 PM  Urgency: Elective    Airway not difficult    General Information and Staff    Patient location during procedure: OR  Anesthesiologist: Darling Holder MD  Performed: anesthesiologist   Performed by: Darling Holder MD  Authorized by: Darling Holder MD      Indications and Patient Condition  Indications for airway management: anesthesia  Spontaneous ventilation: present  Sedation level: deep  Preoxygenated: yes  Patient position: sniffing  Mask difficulty assessment: 1 - vent by mask    Final Airway Details  Final airway type: supraglottic airway      Successful airway: classic  Size 4  Airway Seal Pressure (cm H2O): 30       Number of attempts at approach: 1  Number of other approaches attempted: 0

## 2023-11-06 NOTE — BRIEF OP NOTE
Pre-Operative Diagnosis: Malignant neoplasm of central portion of left breast in female, estrogen receptor positive  [C50.112, Z17.0]     Post-Operative Diagnosis: Malignant neoplasm of central portion of left breast in female, estrogen receptor positive [C50.112, Z17.0]      Procedure Performed:   Left breast wire localized lumpectomy, left breast axillary lymph node dissection    Surgeon(s) and Role:     Beba Reyes MD - Primary    Assistant(s):  Surgical Assistant.: Tanika Yu CSA     Surgical Findings: Clip seen within specimen radiogram, multiple palpable left axillary lymph nodes     Specimen: Left lumpectomy, left margins x6, left axillary contents     Estimated Blood Loss: Blood Output: 10 mL (11/6/2023  3:15 PM)      Richard Espino MD  11/6/2023  3:48 PM

## 2023-11-07 NOTE — OPERATIVE REPORT
[facility]    PATIENT'S NAME: Bill Hutson   ATTENDING PHYSICIAN: Winston Jenkins. Zack Paige MD   OPERATING PHYSICIAN: Winston Jenkins. Zack Paige MD   PATIENT ACCOUNT#:   [id number]    LOCATION:  [address]  MEDICAL RECORD #:   [id number]       YOB: 1975  ADMISSION DATE:       11/06/2023      OPERATION DATE:  11/06/2023    OPERATIVE REPORT    PREOPERATIVE DIAGNOSIS:  Left breast cancer. POSTOPERATIVE DIAGNOSIS:  Left breast cancer. PROCEDURE:  Left breast wire-localized lumpectomy with left breast specimen radiography and left breast advancement flap mastopexy for a defect measuring 12 sq cm, with left breast axillary lymph node dissection. ASSISTANT:  Gabi Mclean CSA. ANESTHESIA:  General anesthesia and local.    ESTIMATED BLOOD LOSS:  10 mL. DRAINS:  One #7 fully perforated flat Tad-Abernathy drain to the left axilla. COMPLICATIONS:  No immediate complications. DISPOSITION:  Stable on transfer to recovery room. INDICATIONS:  The patient is a 81-year-old female presenting with an imaging-detected concern of her left breast.  She was found to have disease both involving the breast, as well as a positive left axillary lymph node. She has undergone metastatic workup that was unremarkable, and Medical Oncology has recommended upfront surgical management. We discussed local treatment options. She was motivated for a breast-conserving approach. We discussed the need to achieve free margins, the possibility of re-excision to achieve free margins, as well as the possible need for postoperative systemic and/or radiation therapy. We discussed standard of care being an axillary lymph node dissection given the initial extent of disease seen on imaging, confirmed with biopsy, and seen on her MRI.   Risks and possible complications, therefore, of a lumpectomy with axillary lymph node dissection were discussed with the patient including, but not limited to, infection, bleeding, injury to surrounding structures, possible need for reoperation, and she agreed to the proposed surgery. OPERATIVE TECHNIQUE:  Patient was brought to the imaging suite. She underwent a wire localization of the area of the cancer in the left breast.  She was then brought to the OR, placed in supine position, properly padded and secured. She was given a dose of IV antibiotics, and sequential compression devices were applied to her legs for DVT prophylaxis. General anesthesia was induced. The left breast and arm were prepped and draped in usual sterile fashion. Attention was first taken towards the breast where 1% lidocaine with epinephrine was used to infiltrate the skin and subcutaneous tissues of the targeted incision site. A curvilinear incision was made along the superior areolar border with a 15-blade knife in the skin. The wire was identified and brought into the field. Using sharp dissection and electrocautery, a segment of breast tissue surrounding the tip of the wire was carefully excised and oriented with a short stitch and single clip superiorly, long stitch and double clip laterally, in order to allow for appropriate pathological margin assessment and review. It was then placed in the imaging device where specimen x-ray confirmed the presence of targeted clip, residual area with adequate margins as deemed by myself. Using sharp dissection and electrocautery, 6 margins were taken from the interior of the lumpectomy cavity. Each was marked with a clip at true margin, individually labeled, and sent for permanent pathologic evaluation. This cavity was then irrigated with warm sterile saline. Hemostasis was assured with electrocautery, and hemoclips were placed around the periphery of the cavity to assist in subsequent radiation planning.   She was found have a large defect in the superior central breast measuring at least 12 sq cm thought to impair both optimal wound healing and cosmesis, for which we proceeded with an advancement flap mastopexy. This was also performed in order to allow for adequate wound healing. This required that I place a counterincision through the immediately adjacent superolateral breast parenchyma down to the level of the pectoralis fascia. Via a superior vascular pedicle, tissue was then mobilized into the aforementioned defect and secured to the level of pectoralis fascia with a running 3-0 PDS suture. Her wound was then closed with interrupted 3-0 Vicryl for deep layer, running 4-0 subcuticular Monocryl for skin. Mastisol and Steri-Strips were applied, and 0.5% Marcaine was instilled into the cavity to assist with postoperative analgesia. Attention was taken towards the left axilla. Lidocaine 1% with epinephrine was used to infiltrate the skin at the targeted incision site. A transverse incision was made near the inferior aspect of the left axillary hairline. Using sharp dissection and electrocautery, I dissected through the clavipectoral fascia. She was noted to have multiple palpable and bulky lymph nodes in this area. The borders of the axillary dissection were defined as the axillary vein, latissimus tendon, and chest wall, and blunt and sharp dissection were used to evacuate the left axillary contents. These were sent for permanent pathologic evaluation labeled as left axillary contents. Her long thoracic and thoracodorsal nerves were both confirmed to be intact at the conclusion of this portion of the procedure. This wound was irrigated. Hemostasis was assured with electrocautery and hemoclips. A #7 fully perforated flat Tad-Abernathy drain was placed into this defect and secured laterally with a nylon suture, Biopatch, and Tegaderm. This wound was then closed with an interrupted 3-0 Vicryl for deep layer, running 4-0 subcuticular Monocryl for skin.   Mastisol and Steri-Strips were applied, and 0.5% Marcaine was instilled in the cavity to assist with postoperative analgesia. A sterile dressing and compression bra were placed. Her blood loss was minimal.  All counts were correct at the conclusion of the procedure. She tolerated the procedure well. She was transferred to the recovery area in stable condition. Dictated By Renata Rowley.  Maral Barrera MD  d: 11/06/2023 15:56:33  t: 11/06/2023 17:14:54  Job [id number]  OIE/    cc: MD Anaya Butler MD

## 2023-11-15 ENCOUNTER — OFFICE VISIT (OUTPATIENT)
Dept: SURGERY | Facility: CLINIC | Age: 48
End: 2023-11-15
Payer: COMMERCIAL

## 2023-11-15 ENCOUNTER — OFFICE VISIT (OUTPATIENT)
Dept: HEMATOLOGY/ONCOLOGY | Facility: HOSPITAL | Age: 48
End: 2023-11-15
Attending: INTERNAL MEDICINE
Payer: COMMERCIAL

## 2023-11-15 VITALS
HEIGHT: 65.98 IN | OXYGEN SATURATION: 99 % | BODY MASS INDEX: 34.42 KG/M2 | WEIGHT: 214.19 LBS | DIASTOLIC BLOOD PRESSURE: 63 MMHG | TEMPERATURE: 98 F | RESPIRATION RATE: 16 BRPM | HEART RATE: 65 BPM | SYSTOLIC BLOOD PRESSURE: 116 MMHG

## 2023-11-15 VITALS
HEART RATE: 65 BPM | DIASTOLIC BLOOD PRESSURE: 63 MMHG | OXYGEN SATURATION: 99 % | WEIGHT: 214 LBS | SYSTOLIC BLOOD PRESSURE: 116 MMHG | HEIGHT: 65.98 IN | BODY MASS INDEX: 34.39 KG/M2 | TEMPERATURE: 98 F | RESPIRATION RATE: 16 BRPM

## 2023-11-15 DIAGNOSIS — Z45.2 ENCOUNTER FOR CENTRAL LINE CARE: Primary | ICD-10-CM

## 2023-11-15 DIAGNOSIS — Z79.899 ENCOUNTER FOR MONITORING CARDIOTOXIC DRUG THERAPY: Primary | ICD-10-CM

## 2023-11-15 DIAGNOSIS — C50.112 MALIGNANT NEOPLASM OF CENTRAL PORTION OF LEFT BREAST IN FEMALE, ESTROGEN RECEPTOR POSITIVE: ICD-10-CM

## 2023-11-15 DIAGNOSIS — Z17.0 MALIGNANT NEOPLASM OF CENTRAL PORTION OF LEFT BREAST IN FEMALE, ESTROGEN RECEPTOR POSITIVE: ICD-10-CM

## 2023-11-15 DIAGNOSIS — C50.112 MALIGNANT NEOPLASM OF CENTRAL PORTION OF LEFT BREAST IN FEMALE, ESTROGEN RECEPTOR POSITIVE: Primary | ICD-10-CM

## 2023-11-15 DIAGNOSIS — Z17.0 MALIGNANT NEOPLASM OF CENTRAL PORTION OF LEFT BREAST IN FEMALE, ESTROGEN RECEPTOR POSITIVE: Primary | ICD-10-CM

## 2023-11-15 DIAGNOSIS — Z80.3 FAMILY HISTORY OF MALIGNANT NEOPLASM OF BREAST: ICD-10-CM

## 2023-11-15 DIAGNOSIS — Z51.81 ENCOUNTER FOR MONITORING CARDIOTOXIC DRUG THERAPY: Primary | ICD-10-CM

## 2023-11-15 PROCEDURE — 99024 POSTOP FOLLOW-UP VISIT: CPT | Performed by: SURGERY

## 2023-11-15 PROCEDURE — 3078F DIAST BP <80 MM HG: CPT | Performed by: INTERNAL MEDICINE

## 2023-11-15 PROCEDURE — 3074F SYST BP LT 130 MM HG: CPT | Performed by: INTERNAL MEDICINE

## 2023-11-15 PROCEDURE — 3078F DIAST BP <80 MM HG: CPT | Performed by: SURGERY

## 2023-11-15 PROCEDURE — 3008F BODY MASS INDEX DOCD: CPT | Performed by: SURGERY

## 2023-11-15 PROCEDURE — 99215 OFFICE O/P EST HI 40 MIN: CPT | Performed by: INTERNAL MEDICINE

## 2023-11-15 PROCEDURE — 3074F SYST BP LT 130 MM HG: CPT | Performed by: SURGERY

## 2023-11-15 PROCEDURE — 3008F BODY MASS INDEX DOCD: CPT | Performed by: INTERNAL MEDICINE

## 2023-11-15 RX ORDER — HEPARIN SODIUM (PORCINE) LOCK FLUSH IV SOLN 100 UNIT/ML 100 UNIT/ML
5 SOLUTION INTRAVENOUS ONCE
OUTPATIENT
Start: 2023-11-15

## 2023-11-15 RX ORDER — SODIUM CHLORIDE 9 MG/ML
10 INJECTION INTRAVENOUS ONCE
OUTPATIENT
Start: 2023-11-15

## 2023-11-16 ENCOUNTER — TELEPHONE (OUTPATIENT)
Dept: HEMATOLOGY/ONCOLOGY | Facility: HOSPITAL | Age: 48
End: 2023-11-16

## 2023-11-16 NOTE — TELEPHONE ENCOUNTER
Phoned patient for care coordination. Dr. Cheryl Drew has placed orders for MUGA prior to initiation of chemotherapy. Appointment scheduled for 12/1/23 at 12pm, immediately prior to her chemotherapy education. Patient acknowledged and denied questions.

## 2023-11-22 ENCOUNTER — OFFICE VISIT (OUTPATIENT)
Dept: SURGERY | Facility: CLINIC | Age: 48
End: 2023-11-22
Payer: COMMERCIAL

## 2023-11-22 VITALS
SYSTOLIC BLOOD PRESSURE: 115 MMHG | HEART RATE: 71 BPM | RESPIRATION RATE: 15 BRPM | OXYGEN SATURATION: 98 % | DIASTOLIC BLOOD PRESSURE: 76 MMHG

## 2023-11-22 DIAGNOSIS — C50.112 MALIGNANT NEOPLASM OF CENTRAL PORTION OF LEFT BREAST IN FEMALE, ESTROGEN RECEPTOR POSITIVE: Primary | ICD-10-CM

## 2023-11-22 DIAGNOSIS — Z17.0 MALIGNANT NEOPLASM OF CENTRAL PORTION OF LEFT BREAST IN FEMALE, ESTROGEN RECEPTOR POSITIVE: Primary | ICD-10-CM

## 2023-11-22 NOTE — PROGRESS NOTES
Patient presents today for a post-op visit for surgical wound check. Pt had a left breast lumpectomy with left axillary dissection on 11/6/2023. Pt has been healing well since surgery. Pain has been tolerable and managed with tylenol. Pt denies any signs or symptoms of infection, including fever, chills, redness at surgical site, increase swelling, surrounding skin that is hot to touch, and abnormal drainage from the site. Steri strips are still intact and in place. Surrounding skin is c/d/i.  IRENE drain was removed today due to low output. There was a large amount of output after drain was pulled. Pressure was applied. Neosporin and Tegaderm was applied. Informed pt when she should call the office regarding concerns with the surgical incision site. Informed pt that her next follow-up will be with Dr. Zack Paige on 12/6/2023 at [place] [id number]. Pt verbalized understanding. All questions were answered. Encouraged to call or MyChart message with any other questions or concerns.

## 2023-12-01 ENCOUNTER — HOSPITAL ENCOUNTER (OUTPATIENT)
Dept: NUCLEAR MEDICINE | Facility: HOSPITAL | Age: 48
Discharge: HOME | End: 2023-12-01
Attending: INTERNAL MEDICINE
Payer: COMMERCIAL

## 2023-12-01 ENCOUNTER — NURSE ONLY (OUTPATIENT)
Dept: HEMATOLOGY/ONCOLOGY | Facility: HOSPITAL | Age: 48
End: 2023-12-01
Attending: NURSE PRACTITIONER
Payer: COMMERCIAL

## 2023-12-01 DIAGNOSIS — Z45.2 ENCOUNTER FOR CENTRAL LINE CARE: ICD-10-CM

## 2023-12-01 DIAGNOSIS — Z51.81 ENCOUNTER FOR MONITORING CARDIOTOXIC DRUG THERAPY: ICD-10-CM

## 2023-12-01 DIAGNOSIS — C50.112 MALIGNANT NEOPLASM OF CENTRAL PORTION OF LEFT BREAST IN FEMALE, ESTROGEN RECEPTOR POSITIVE: ICD-10-CM

## 2023-12-01 DIAGNOSIS — C50.112 MALIGNANT NEOPLASM OF CENTRAL PORTION OF LEFT BREAST IN FEMALE, ESTROGEN RECEPTOR POSITIVE: Primary | ICD-10-CM

## 2023-12-01 DIAGNOSIS — Z17.0 MALIGNANT NEOPLASM OF CENTRAL PORTION OF LEFT BREAST IN FEMALE, ESTROGEN RECEPTOR POSITIVE: ICD-10-CM

## 2023-12-01 DIAGNOSIS — Z79.899 ENCOUNTER FOR MONITORING CARDIOTOXIC DRUG THERAPY: ICD-10-CM

## 2023-12-01 DIAGNOSIS — Z51.81 MEDICATION MONITORING ENCOUNTER: ICD-10-CM

## 2023-12-01 DIAGNOSIS — Z17.0 MALIGNANT NEOPLASM OF CENTRAL PORTION OF LEFT BREAST IN FEMALE, ESTROGEN RECEPTOR POSITIVE: Primary | ICD-10-CM

## 2023-12-01 LAB
ALBUMIN SERPL-MCNC: 4.6 G/DL (ref 3.2–4.8)
ALBUMIN/GLOB SERPL: 1.5 {RATIO} (ref 1–2)
ALP LIVER SERPL-CCNC: 73 U/L
ALT SERPL-CCNC: 11 U/L
ANION GAP SERPL CALC-SCNC: 6 MMOL/L (ref 0–18)
AST SERPL-CCNC: 12 U/L (ref ?–34)
BASOPHILS # BLD AUTO: 0.04 X10(3) UL (ref 0–0.2)
BASOPHILS NFR BLD AUTO: 0.8 %
BILIRUB SERPL-MCNC: 0.6 MG/DL (ref 0.3–1.2)
BUN BLD-MCNC: <5 MG/DL (ref 9–23)
CALCIUM BLD-MCNC: 9.9 MG/DL (ref 8.7–10.4)
CHLORIDE SERPL-SCNC: 103 MMOL/L (ref 98–112)
CO2 SERPL-SCNC: 30 MMOL/L (ref 21–32)
CREAT BLD-MCNC: 0.66 MG/DL
DEPRECATED RDW RBC AUTO: 39.8 FL (ref 35.1–46.3)
EGFRCR SERPLBLD CKD-EPI 2021: 109 ML/MIN/1.73M2 (ref 60–?)
EOSINOPHIL # BLD AUTO: 0.15 X10(3) UL (ref 0–0.7)
EOSINOPHIL NFR BLD AUTO: 3 %
ERYTHROCYTE [DISTWIDTH] IN BLOOD BY AUTOMATED COUNT: 11.4 % (ref 11–15)
GLOBULIN PLAS-MCNC: 3.1 G/DL (ref 2.8–4.4)
GLUCOSE BLD-MCNC: 89 MG/DL (ref 70–99)
HCT VFR BLD AUTO: 39 %
HGB BLD-MCNC: 12.8 G/DL
IMM GRANULOCYTES # BLD AUTO: 0.01 X10(3) UL (ref 0–1)
IMM GRANULOCYTES NFR BLD: 0.2 %
LYMPHOCYTES # BLD AUTO: 2.11 X10(3) UL (ref 1–4)
LYMPHOCYTES NFR BLD AUTO: 42 %
MCH RBC QN AUTO: 31.4 PG (ref 26–34)
MCHC RBC AUTO-ENTMCNC: 32.8 G/DL (ref 31–37)
MCV RBC AUTO: 95.6 FL
MONOCYTES # BLD AUTO: 0.38 X10(3) UL (ref 0.1–1)
MONOCYTES NFR BLD AUTO: 7.6 %
NEUTROPHILS # BLD AUTO: 2.33 X10 (3) UL (ref 1.5–7.7)
NEUTROPHILS # BLD AUTO: 2.33 X10(3) UL (ref 1.5–7.7)
NEUTROPHILS NFR BLD AUTO: 46.4 %
PLATELET # BLD AUTO: 323 10(3)UL (ref 150–450)
POTASSIUM SERPL-SCNC: 4.1 MMOL/L (ref 3.5–5.1)
PROT SERPL-MCNC: 7.7 G/DL (ref 5.7–8.2)
RBC # BLD AUTO: 4.08 X10(6)UL
SODIUM SERPL-SCNC: 139 MMOL/L (ref 136–145)
WBC # BLD AUTO: 5 X10(3) UL (ref 4–11)

## 2023-12-01 PROCEDURE — 99215 OFFICE O/P EST HI 40 MIN: CPT | Performed by: NURSE PRACTITIONER

## 2023-12-01 PROCEDURE — 99417 PROLNG OP E/M EACH 15 MIN: CPT | Performed by: NURSE PRACTITIONER

## 2023-12-01 PROCEDURE — 85025 COMPLETE CBC W/AUTO DIFF WBC: CPT

## 2023-12-01 PROCEDURE — 78472 GATED HEART PLANAR SINGLE: CPT | Performed by: INTERNAL MEDICINE

## 2023-12-01 PROCEDURE — 80053 COMPREHEN METABOLIC PANEL: CPT

## 2023-12-01 PROCEDURE — 36415 COLL VENOUS BLD VENIPUNCTURE: CPT

## 2023-12-01 RX ORDER — PROCHLORPERAZINE MALEATE 10 MG
10 TABLET ORAL EVERY 8 HOURS PRN
Qty: 30 TABLET | Refills: 3 | Status: SHIPPED | OUTPATIENT
Start: 2023-12-01

## 2023-12-01 RX ORDER — ONDANSETRON HYDROCHLORIDE 8 MG/1
8 TABLET, FILM COATED ORAL EVERY 8 HOURS PRN
Qty: 30 TABLET | Refills: 3 | Status: SHIPPED | OUTPATIENT
Start: 2023-12-01

## 2023-12-04 ENCOUNTER — TELEPHONE (OUTPATIENT)
Dept: HEMATOLOGY/ONCOLOGY | Facility: HOSPITAL | Age: 48
End: 2023-12-04

## 2023-12-04 NOTE — TELEPHONE ENCOUNTER
Called patient to schedule appointment - first attempt. Left message to call back. Called patient to schedule first chemo appointment. Patient did not answer did leave message on voicemail.

## 2023-12-05 ENCOUNTER — OFFICE VISIT (OUTPATIENT)
Dept: PHYSICAL THERAPY | Facility: HOSPITAL | Age: 48
End: 2023-12-05
Attending: FAMILY MEDICINE
Payer: COMMERCIAL

## 2023-12-05 DIAGNOSIS — M25.612 DECREASED ROM OF LEFT SHOULDER: ICD-10-CM

## 2023-12-05 DIAGNOSIS — L90.5 AXILLARY WEB SYNDROME: ICD-10-CM

## 2023-12-05 DIAGNOSIS — Z98.890 S/P LUMPECTOMY, LEFT BREAST: Primary | ICD-10-CM

## 2023-12-05 DIAGNOSIS — L76.82 AXILLARY WEB SYNDROME: ICD-10-CM

## 2023-12-05 NOTE — PROGRESS NOTES
BREAST CANCER SURGICAL SCREENINGS  [facility]      PATIENT SUMMARY:     Involved Side:       Left                                              Dominant Hand:  right but uses both                                Occupation:                       Prior Level of Function:   no issues                                       Social Activities:                                                            Pertinent PMH:            HISTORY:  Past Medical History:   Diagnosis Date    High blood pressure     Hypertension     Trigeminal neuralgia of left side of face       Past Surgical History:   Procedure Laterality Date    LUMPECTOMY LEFT  2023    NEEDLE BIOPSY RIGHT Right     bx done at age 24yrs old          x2                                                                   PCP, Surgeon, and Oncologist:         Ramon Garcia                                                    Surgery Type and Date:           2023 L breast lumpectomy                                               # Nodes + / # Nodes Removed:      Left ALND,                                                                    Chemotherapy and/or Radiation:     Chemo then radiation                                                                                        OBJECTIVE MEASUREMENTS:   2023  REFERRED TO THERAPY  -sent information to [facility] re: sleeve  -axillary cording goes down to wrist  -Limited LUE shoulder mobility d/t cording  -will need to discuss compression sleeve and lymphedema awareness at PT evaluation  10/25/2023  -discussed to start exercises 1 week after surgery. Limit arm motion LUE to shoulder height when drains are in.                   Pre-surgical screening date: 10/25/2023    Post-surgical screening date: 2023    Post-surgical screening date: 2023     Pain scale:      Pain scale:   \"Tightness LUE, pain in forearm\"    Pain scale:      Screening Therapist: ILIANA Cheatham Therapist: ILIANA    Screening Therapist:                                                                                        Right Left    Right Left    Right Left   Shoulder  A/AAROM A/AAROM  Shoulder  A/AAROM A/AAROM  Shoulder  A/AAROM A/AAROM   Supine flex [facility] WFL  Supine flex    Supine flex      abd WFL WFL   abd     abd      ER     ER     ER      IR     IR     IR     Sitting flex 160 160  Sitting flex   Sitting flex      abd WFL WFL   abd    abd      ext [facility]   ext     ext     Functional IR     Functional IR     Functional IR                      Shoulder strength  Right Left  Shoulder strength  Right Left  Shoulder strength  Right Left    flex 5 5   flex     flex      abd 5 5   abd     abd      ext 5 5   ext     ext      ER 5 5   ER     ER      IR 5 5   IR     IR                     Posture:     Posture: Fair, rounded shoulders    Posture:                      Cording (grade 0-3):     Cording (grade 0-3):     Cording (grade 0-3):      R: 0    R: 0    R:      L: 0    L: 2    L:      Miscellaneous:      Miscellaneous:      Miscellaneous:                                                                       Any feelings of heaviness or tightness, swelling, redness, and/or heat in the at-risk arm, breast, chest, or truncal area? none    Any feelings of heaviness or tightness, swelling, redness, and/or heat in the at-risk arm, breast, chest, or truncal area? TIGHT,     Any feelings of heaviness or tightness, swelling, redness, and/or heat in the at-risk arm, breast, chest, or truncal area?                                       Arm Volume     Arm Volume     Arm Volume              12/5/2023     6:00 PM 10/25/2023     3:00 PM   Lymphedema Calculations   DATE MEASURED 12/5/2023 10/25/2023   LOCATION/MEASUREMENTS [facility]   PATIENT POSITION  supine 1 pillow supine 1 pillow   LIMB POSITION 75 deg abduction 75 deg abduction   STARTING POINT 17cm from 3rd cuticle 17cm from 3rd cuticle   RIGHT HAND VOLUME 21.7cm across palm 21.7cm across palm   LEFT HAND VOLUME 21.7cm across palm 21.7cm across palm   MEASUREMENT A 16.5 16.5   MEASUREMENT B 20.1 20.2   MEASUREMENT C 24.5 24.7   MEASUREMENT D 27.8 27.8   MEASUREMENT E 28.5 28.1   MEASUREMENT F 31 31   MEASUREMENT G 32.8 32.8   MEASUREMENT H 35.8 36   MEASUREMENT I 41.3 41.2    TOTAL VOLUME  2738.48100 [id number]   DATE MEASURED 12/5/2023 10/25/2023   LOCATION/MEASUREMENTS RUE RUE   MEASUREMENT A 17.1 17   MEASUREMENT B 20.4 20.6   MEASUREMENT C 24.7 24.8   MEASUREMENT D 27.5 27.7   MEASUREMENT E 28.2 28.2   MEASUREMENT F 30.3 30.5   MEASUREMENT G 32 32.6   MEASUREMENT H 35.6 35.8   MEASUREMENT I 40.2 39.8    TOTAL VOLUME  [id number] 2705.40155   % DIFFERENCE 2.30686 1.5629

## 2023-12-06 ENCOUNTER — OFFICE VISIT (OUTPATIENT)
Dept: SURGERY | Facility: CLINIC | Age: 48
End: 2023-12-06
Payer: COMMERCIAL

## 2023-12-06 VITALS
RESPIRATION RATE: 16 BRPM | BODY MASS INDEX: 35 KG/M2 | OXYGEN SATURATION: 97 % | HEART RATE: 70 BPM | WEIGHT: 214 LBS | TEMPERATURE: 96 F | SYSTOLIC BLOOD PRESSURE: 137 MMHG | DIASTOLIC BLOOD PRESSURE: 84 MMHG

## 2023-12-06 DIAGNOSIS — C50.112 MALIGNANT NEOPLASM OF CENTRAL PORTION OF LEFT BREAST IN FEMALE, ESTROGEN RECEPTOR POSITIVE: Primary | ICD-10-CM

## 2023-12-06 DIAGNOSIS — Z17.0 MALIGNANT NEOPLASM OF CENTRAL PORTION OF LEFT BREAST IN FEMALE, ESTROGEN RECEPTOR POSITIVE: Primary | ICD-10-CM

## 2023-12-06 PROCEDURE — 3079F DIAST BP 80-89 MM HG: CPT | Performed by: SURGERY

## 2023-12-06 PROCEDURE — 3075F SYST BP GE 130 - 139MM HG: CPT | Performed by: SURGERY

## 2023-12-06 PROCEDURE — 99024 POSTOP FOLLOW-UP VISIT: CPT | Performed by: SURGERY

## 2023-12-12 ENCOUNTER — TELEPHONE (OUTPATIENT)
Dept: PHYSICAL THERAPY | Facility: HOSPITAL | Age: 48
End: 2023-12-12

## 2023-12-13 ENCOUNTER — OFFICE VISIT (OUTPATIENT)
Dept: PHYSICAL THERAPY | Facility: HOSPITAL | Age: 48
End: 2023-12-13
Attending: SURGERY
Payer: COMMERCIAL

## 2023-12-13 DIAGNOSIS — C50.919 BREAST CANCER (HCC): ICD-10-CM

## 2023-12-13 DIAGNOSIS — L90.5 AXILLARY WEB SYNDROME: Primary | ICD-10-CM

## 2023-12-13 DIAGNOSIS — L76.82 AXILLARY WEB SYNDROME: Primary | ICD-10-CM

## 2023-12-13 PROCEDURE — 97161 PT EVAL LOW COMPLEX 20 MIN: CPT | Performed by: PHYSICAL THERAPIST

## 2023-12-13 PROCEDURE — 97140 MANUAL THERAPY 1/> REGIONS: CPT | Performed by: PHYSICAL THERAPIST

## 2023-12-14 PROBLEM — I89.8: Status: ACTIVE | Noted: 2023-12-14

## 2023-12-19 ENCOUNTER — OFFICE VISIT (OUTPATIENT)
Dept: PHYSICAL THERAPY | Facility: HOSPITAL | Age: 48
End: 2023-12-19
Attending: SURGERY
Payer: COMMERCIAL

## 2023-12-19 PROCEDURE — 97140 MANUAL THERAPY 1/> REGIONS: CPT

## 2023-12-19 PROCEDURE — 97110 THERAPEUTIC EXERCISES: CPT

## 2023-12-20 ENCOUNTER — HOSPITAL ENCOUNTER (OUTPATIENT)
Dept: INTERVENTIONAL RADIOLOGY/VASCULAR | Facility: HOSPITAL | Age: 48
Discharge: HOME | End: 2023-12-20
Attending: INTERNAL MEDICINE | Admitting: RADIOLOGY
Payer: COMMERCIAL

## 2023-12-20 VITALS
TEMPERATURE: 97 F | SYSTOLIC BLOOD PRESSURE: 126 MMHG | HEART RATE: 66 BPM | HEIGHT: 65.98 IN | OXYGEN SATURATION: 100 % | DIASTOLIC BLOOD PRESSURE: 79 MMHG | BODY MASS INDEX: 34.39 KG/M2 | WEIGHT: 214 LBS | RESPIRATION RATE: 20 BRPM

## 2023-12-20 DIAGNOSIS — Z17.0 MALIGNANT NEOPLASM OF CENTRAL PORTION OF LEFT BREAST IN FEMALE, ESTROGEN RECEPTOR POSITIVE  (HCC): ICD-10-CM

## 2023-12-20 DIAGNOSIS — C50.112 MALIGNANT NEOPLASM OF CENTRAL PORTION OF LEFT BREAST IN FEMALE, ESTROGEN RECEPTOR POSITIVE  (HCC): ICD-10-CM

## 2023-12-20 DIAGNOSIS — Z01.818 PRE-OP TESTING: Primary | ICD-10-CM

## 2023-12-20 DIAGNOSIS — Z45.2 ENCOUNTER FOR CENTRAL LINE CARE: ICD-10-CM

## 2023-12-20 PROCEDURE — 77001 FLUOROGUIDE FOR VEIN DEVICE: CPT | Performed by: RADIOLOGY

## 2023-12-20 PROCEDURE — 99152 MOD SED SAME PHYS/QHP 5/>YRS: CPT | Performed by: RADIOLOGY

## 2023-12-20 PROCEDURE — 36415 COLL VENOUS BLD VENIPUNCTURE: CPT

## 2023-12-20 PROCEDURE — 0JH60WZ INSERTION OF TOTALLY IMPLANTABLE VASCULAR ACCESS DEVICE INTO CHEST SUBCUTANEOUS TISSUE AND FASCIA, OPEN APPROACH: ICD-10-PCS | Performed by: STUDENT IN AN ORGANIZED HEALTH CARE EDUCATION/TRAINING PROGRAM

## 2023-12-20 PROCEDURE — 36561 INSERT TUNNELED CV CATH: CPT | Performed by: RADIOLOGY

## 2023-12-20 RX ORDER — SODIUM CHLORIDE 9 MG/ML
INJECTION, SOLUTION INTRAVENOUS CONTINUOUS
Status: DISCONTINUED | OUTPATIENT
Start: 2023-12-20 | End: 2023-12-20

## 2023-12-20 RX ORDER — HEPARIN SODIUM (PORCINE) LOCK FLUSH IV SOLN 100 UNIT/ML 100 UNIT/ML
SOLUTION INTRAVENOUS
Status: COMPLETED
Start: 2023-12-20 | End: 2023-12-20

## 2023-12-20 RX ORDER — CEFAZOLIN SODIUM/WATER 2 G/20 ML
SYRINGE (ML) INTRAVENOUS
Status: COMPLETED
Start: 2023-12-20 | End: 2023-12-20

## 2023-12-20 RX ORDER — MIDAZOLAM HYDROCHLORIDE 1 MG/ML
INJECTION INTRAMUSCULAR; INTRAVENOUS
Status: COMPLETED
Start: 2023-12-20 | End: 2023-12-20

## 2023-12-20 RX ORDER — LIDOCAINE HYDROCHLORIDE 20 MG/ML
INJECTION, SOLUTION INFILTRATION; PERINEURAL
Status: COMPLETED
Start: 2023-12-20 | End: 2023-12-20

## 2023-12-20 RX ADMIN — SODIUM CHLORIDE: 9 INJECTION, SOLUTION INTRAVENOUS at 09:00:00

## 2023-12-20 NOTE — DISCHARGE INSTRUCTIONS
Pr-14  4.2  [phone]     Patient Name:  Courtenay Boast    Procedure:  PORT INSERTION    Site Care: Dermabond (skin glue) has been applied to your incision. Do not scrub the area. Allow the Dermabond to flake off on its own. Activity/Diet  No heavy lifting or strenuous activity for 48 hours. Drink plenty of fluids, unless you have otherwise been told to restrict your fluid intake. Do not drink alcohol for 24 hours. Do not drive,  operate heavy machinery, make important decisions or sign legal documents today. Medications: Take acetaminophen if needed for pain. Do not exceed 4000mg of acetaminophen in a 24-hour period. , Do not take blood thinners, aspirin, or Plavix for 24 hours. , and Make no changes to your existing medications. Contact Interventional Radiology at [phone] if you have severe/unrelieved pain, fever, chills, dizziness/lightheadedness, or drainage/bleeding from your incision site.

## 2023-12-20 NOTE — IVS NOTE
DISCHARGE NOTE     Pt is able to sit up and ambulate without difficulty. Pt voided and tolerated fluids and food. No nausea or vomiting noted  Procedural site remains dry and intact with no signs and symptoms of bleeding/hematoma noted. IV access removed  Instruction provided, patient verbalizes understanding.    Pt discharge via wheelchair to [facility]

## 2023-12-20 NOTE — INTERVAL H&P NOTE
The above referenced H&P was reviewed by Ayleen Cardozo MD on 12/20/2023, the patient was examined and no significant changes have occurred in the patient's condition since the H&P was performed. Risks, benefits, alternative treatments and consequences of no treatment were discussed. We will proceed with procedure as planned.       Ayleen Cardozo MD  12/20/2023  9:15 AM

## 2023-12-26 ENCOUNTER — OFFICE VISIT (OUTPATIENT)
Dept: PHYSICAL THERAPY | Facility: HOSPITAL | Age: 48
End: 2023-12-26
Attending: SURGERY
Payer: COMMERCIAL

## 2023-12-26 ENCOUNTER — TELEPHONE (OUTPATIENT)
Dept: SURGERY | Facility: CLINIC | Age: 48
End: 2023-12-26

## 2023-12-26 PROCEDURE — 97140 MANUAL THERAPY 1/> REGIONS: CPT

## 2023-12-26 RX ORDER — DOXORUBICIN HYDROCHLORIDE 2 MG/ML
60 INJECTION, SOLUTION INTRAVENOUS ONCE
Status: CANCELLED | OUTPATIENT
Start: 2023-12-27

## 2023-12-26 NOTE — TELEPHONE ENCOUNTER
Compression garment order from 12/13/2023 faxed to [employer]oma[employer]  Fax number [phone] and alternative fax number [phone].

## 2023-12-27 ENCOUNTER — SOCIAL WORK SERVICES (OUTPATIENT)
Dept: HEMATOLOGY/ONCOLOGY | Facility: HOSPITAL | Age: 48
End: 2023-12-27

## 2023-12-27 ENCOUNTER — NURSE ONLY (OUTPATIENT)
Dept: HEMATOLOGY/ONCOLOGY | Facility: HOSPITAL | Age: 48
End: 2023-12-27
Attending: NURSE PRACTITIONER
Payer: COMMERCIAL

## 2023-12-27 VITALS
DIASTOLIC BLOOD PRESSURE: 75 MMHG | RESPIRATION RATE: 16 BRPM | WEIGHT: 209.31 LBS | BODY MASS INDEX: 34 KG/M2 | TEMPERATURE: 98 F | SYSTOLIC BLOOD PRESSURE: 124 MMHG | OXYGEN SATURATION: 97 % | HEART RATE: 76 BPM

## 2023-12-27 DIAGNOSIS — C50.112 MALIGNANT NEOPLASM OF CENTRAL PORTION OF LEFT BREAST IN FEMALE, ESTROGEN RECEPTOR POSITIVE  (HCC): Primary | ICD-10-CM

## 2023-12-27 DIAGNOSIS — Z45.2 ENCOUNTER FOR CENTRAL LINE CARE: ICD-10-CM

## 2023-12-27 DIAGNOSIS — Z17.0 MALIGNANT NEOPLASM OF CENTRAL PORTION OF LEFT BREAST IN FEMALE, ESTROGEN RECEPTOR POSITIVE  (HCC): Primary | ICD-10-CM

## 2023-12-27 PROCEDURE — 96372 THER/PROPH/DIAG INJ SC/IM: CPT

## 2023-12-27 PROCEDURE — 96413 CHEMO IV INFUSION 1 HR: CPT

## 2023-12-27 PROCEDURE — 96367 TX/PROPH/DG ADDL SEQ IV INF: CPT

## 2023-12-27 PROCEDURE — 96411 CHEMO IV PUSH ADDL DRUG: CPT

## 2023-12-27 RX ORDER — DOXORUBICIN HYDROCHLORIDE 2 MG/ML
60 INJECTION, SOLUTION INTRAVENOUS ONCE
Status: COMPLETED | OUTPATIENT
Start: 2023-12-27 | End: 2023-12-27

## 2023-12-27 RX ORDER — HEPARIN SODIUM (PORCINE) LOCK FLUSH IV SOLN 100 UNIT/ML 100 UNIT/ML
SOLUTION INTRAVENOUS
Status: COMPLETED
Start: 2023-12-27 | End: 2023-12-27

## 2023-12-27 RX ORDER — SODIUM CHLORIDE 9 MG/ML
10 INJECTION INTRAVENOUS ONCE
OUTPATIENT
Start: 2023-12-27

## 2023-12-27 RX ORDER — HEPARIN SODIUM (PORCINE) LOCK FLUSH IV SOLN 100 UNIT/ML 100 UNIT/ML
5 SOLUTION INTRAVENOUS ONCE
OUTPATIENT
Start: 2023-12-27

## 2023-12-27 RX ORDER — HEPARIN SODIUM (PORCINE) LOCK FLUSH IV SOLN 100 UNIT/ML 100 UNIT/ML
5 SOLUTION INTRAVENOUS ONCE
Status: COMPLETED | OUTPATIENT
Start: 2023-12-27 | End: 2023-12-27

## 2023-12-27 RX ADMIN — DOXORUBICIN HYDROCHLORIDE 124 MG: 2 INJECTION, SOLUTION INTRAVENOUS at 11:12:00

## 2023-12-27 RX ADMIN — HEPARIN SODIUM (PORCINE) LOCK FLUSH IV SOLN 100 UNIT/ML 500 UNITS: 100 SOLUTION INTRAVENOUS at 12:02:00

## 2023-12-27 NOTE — PROGRESS NOTES
Pt here for C1D1 Drug(s)AC and Fulphilla. Arrives Ambulating independently, accompanied by Self     Patient was evaluated today by Treatment Nurse. Oral medications included in this regimen:  no    Patient confirms comprehension of cancer treatment schedule:  yes    Pregnancy screening:  Denies possibility of pregnancy    Modifications in dose or schedule:  No    Medications appearance and physical integrity checked by RN: yes. Chemotherapy IV pump settings verified by 2 RNs:  Yes. Frequency of blood return and site check throughout administration: Prior to administration, Prior to each drug, Every 2-3 ml IVP, and At completion of therapy     Infusion/treatment outcome:  patient tolerated treatment without incident    Education Record    Learner:  Patient  Barriers / Limitations:  Emotional factors, Fatigue, and Denial  Method:  Brief focused and Discussion  Education / instructions given:  Discussed plan of care with patient, when to call MD, nausea protocol. Reviewed chemo education. Printed AVS and discussed chemo schedule.    Outcome:  Needs reinforcement    Discharged Home, Ambulating independently, accompanied by:Self    Patient/family verbalized understanding of future appointments: by printed AVS

## 2023-12-27 NOTE — PROGRESS NOTES
SW completed an assessment with pt to provide support and encouragement due to new chemo starting today. Pt is a 53year old woman who lives in [place], [place] with her family. Patient has two sons    Patient works as a      Social determinants of health assessment completed with pt and no needs identified at this time. Pt presents with normal affect and mood. Patient did not identify any feelings of anxiety about starting chemo today. SW reviewed cancer support resources provided by [facility]. SW provided a pack of resources including information on transportation assistance, homecare/home health agencies and counseling resources. SW reviewed Advance Directives and importance of completion. SW explained role of SW in [employer] and provided Bring[employer] gift bag. SW contact information given. Coping skills reviewed and support services encouraged due to new cancer dx.

## 2023-12-28 ENCOUNTER — TELEPHONE (OUTPATIENT)
Dept: HEMATOLOGY/ONCOLOGY | Facility: HOSPITAL | Age: 48
End: 2023-12-28

## 2023-12-28 ENCOUNTER — APPOINTMENT (OUTPATIENT)
Dept: HEMATOLOGY/ONCOLOGY | Facility: HOSPITAL | Age: 48
End: 2023-12-28
Attending: NURSE PRACTITIONER
Payer: COMMERCIAL

## 2023-12-28 NOTE — TELEPHONE ENCOUNTER
Toxicities: C1 D1 Doxorubicin/Cyclophosphamide/Pegfilgrastim-jmdb on 12/27/2023    [facility] : [place] # [id number]    Aguilar Nugent reports that she had some nausea. No vomiting. She took a compazine and it resolved. Today she feels \"good. \"  No side effects today. I reviewed how to alternate her anti nausea medications. I asked her to eat 5-6 small meals with protein instead of 3 big meals. She will have less nausea and vomiting. I encouraged her to please call the office if she is not feeling well or has any questions or concerns. She agreed and thanked me for checking on her. She did ask to speak with Andrea Veliz about her short term disability paper work. I transferred the call to [facility].

## 2023-12-29 ENCOUNTER — SOCIAL WORK SERVICES (OUTPATIENT)
Dept: HEMATOLOGY/ONCOLOGY | Facility: HOSPITAL | Age: 48
End: 2023-12-29

## 2023-12-29 NOTE — PROGRESS NOTES
VERITO assisted patient with her [place] paperwork. VERITO faxed paperwork to [facility] at [phone]. SW uploaded a copy to patient via [address] [place] [address] [address].

## 2024-01-02 ENCOUNTER — OFFICE VISIT (OUTPATIENT)
Dept: PHYSICAL THERAPY | Facility: HOSPITAL | Age: 49
End: 2024-01-02
Attending: SURGERY
Payer: COMMERCIAL

## 2024-01-02 PROCEDURE — 97140 MANUAL THERAPY 1/> REGIONS: CPT | Performed by: PHYSICAL THERAPIST

## 2024-01-02 PROCEDURE — 97110 THERAPEUTIC EXERCISES: CPT | Performed by: PHYSICAL THERAPIST

## 2024-01-02 NOTE — PROGRESS NOTES
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)  Decreased ROM of left shoulder (M25.612)        Date of onset: 11/6/2023 Evaluation Date: 12/13/2023     Physical Therapy Lymphedema Daily Note    Precautions:  will be starting chemo and will need radiation   Education or treatment limitation: chemo  Rehab Potential:good    Past Medical History:   Diagnosis Date    High blood pressure     Hypertension     Trigeminal neuralgia of left side of face          Pain:  2/10 LUE/axilla/chest       Subjective: \"Just a little pain\"  Pt had chemo last week      Objective:    1/2/2024  AROM of shoulders: (ROM on left limited by cording)    Flex: R 160, L 150   Abd: R 160, L 150  Cording L axilla down to forearm  Decreased scar mobility L breast       12/26/2023  AROM/Strength:      Right AROM Left AROM Right Strength Left Strength   Shoulder                 Flexion 150 * pain with port placement 160 *improved nt nt        Abduction 155 *pain with port placement 155 * improved nt nt        IR WFL WFL nt nt        ER NT NT NT NT   Elbow                Flexion WFL WFL NT NT        Extension WFL WFL NT NT               Scapular Strength                Rhomboids     NT NT         Middle Trap     NT NT        Serratus Anterior     NT NT                 12/13/2023 (Evaluation):  Sensation:  decreased L axilla      AROM/Strength:      Right AROM Left AROM Right Strength Left Strength   Shoulder                 Flexion 160 140 nt nt        Abduction 160 110 nt nt        IR WFL WFL nt nt        ER NT NT NT NT   Elbow                Flexion WFL WFL NT NT        Extension WFL WFL NT NT               Scapular Strength                Rhomboids     NT NT         Middle Trap     NT NT        Serratus Anterior     NT NT                        Edema/Tissue Observations:    - swelling LUE  - swelling L breast and trunk               - trunk circumference (Level of nipples);   113.1 cm   - decreased scar mobility L axilla and breast  - cording L axilla, extends into L breast (lateral down toward inferior breast) and into L UE (down to mid forearm)   Stemmer's Sign: negative         Arm Volume Measurements:        12/5/2023     6:00 PM 10/25/2023     3:00 PM   Lymphedema Calculations   DATE MEASURED 12/5/2023 10/25/2023   LOCATION/MEASUREMENTS LUE LUE   PATIENT POSITION  supine 1 pillow supine 1 pillow   LIMB POSITION 75 deg abduction 75 deg abduction   STARTING POINT 17cm from 3rd cuticle 17cm from 3rd cuticle   RIGHT HAND VOLUME 21.7cm across palm 21.7cm across palm   LEFT HAND VOLUME 21.7cm across palm 21.7cm across palm   MEASUREMENT A 16.5 16.5   MEASUREMENT B 20.1 20.2   MEASUREMENT C 24.5 24.7   MEASUREMENT D 27.8 27.8   MEASUREMENT E 28.5 28.1   MEASUREMENT F 31 31   MEASUREMENT G 32.8 32.8   MEASUREMENT H 35.8 36   MEASUREMENT I 41.3 41.2    TOTAL VOLUME  2738.27522 2738.57699   DATE MEASURED 12/5/2023 10/25/2023   LOCATION/MEASUREMENTS RUE RUE   MEASUREMENT A 17.1 17   MEASUREMENT B 20.4 20.6   MEASUREMENT C 24.7 24.8   MEASUREMENT D 27.5 27.7   MEASUREMENT E 28.2 28.2   MEASUREMENT F 30.3 30.5   MEASUREMENT G 32 32.6   MEASUREMENT H 35.6 35.8   MEASUREMENT I 40.2 39.8    TOTAL VOLUME  2677.23182 2705.06714   % DIFFERENCE 2.53295 1.2097                           Lymphedema Life Impact Scale: Evaluation Score 12/13/2023: LLIS Score Evaluation: 29.5 % impaired. (0% is no impairment, 100% total impairment)           Today’s Treatment and Response:   Date 12/13/2023 Date: 12/19/2023 Date: 12/26/2023 Date: 1/2/2023 Date: * Date: *   Visit # 1/12  HMO expires 12/31/2023     Certification From: 12/13/2023  To:3/12/2024     Visit # 2/12  HMO expires 12/31/2023    Certification From: 12/13/2023  To:3/12/2024  Visit: #3/12  HMO expires 12/31/2023    Certification From: 12/13/2023  To:3/12/2024  Visit: #5  O 1/5 expires 3/30/2024    Certification From: 12/13/2023  To:3/12/2024      Visit:  #5/* Visit: #6/*   Manual Therapy (40 minutes)     STM: L axilla/areas of cording. Several cavitations noted     Scar massage L axilla and breast      MLD: neck sequence, R axilla, A-A (Ant), L inguinal, L A-I, L axilla, L breast       Compression:  Discussed w/ patient the benefit of prophylactic compression sleeve. Order added for compression sleeve, [facility] staff requesting HMO referral for compression sleeve and gauntlet.  \"Prophylactic use of compression sleeves compared with the control group reduced and delayed the occurrence of arm swelling in women at high risk for lymphedema in the first year after surgery for breast cancer.\"  Mat et al. Prophylactic Use of Compression Sleeves Reduces the Incidence of Arm Swelling in Women at High Risk of Breast Cancer-Related Lymphedema: A Randomized Controlled Trial. Journal of Clinical Oncology. Accepted on January 7, 2022 and published at [email or web address] on February 2, 2022: DOI https://[email or web address]/10.1200/[email or web address]                 Manual therapy (35 minutes)      STM: L axilla/areas of cording. Several cavitations noted again this session in axilla/upper arm  *cording visible and palpable in forearm but unable to release     Scar massage L axilla and breast      MLD: neck sequence, R axilla, A-A (Ant), L inguinal, L A-I, L axilla, L breast      Compression:  -handed pt the \"order\" and she will bring to her PCP to get referral generated.      Manual therapy (40 minutes)        STM: L axilla/areas of cording.No cavitations noted this session in axilla/upper arm  *cording visible and palpable in forearm but unable to release     Scar massage L axilla and breast      MLD: neck sequence, R axilla, A-A (Ant), L inguinal, L A-I, L axilla, L breast      Compression:  -pt received letter stating that DME is approved.  Pt scheduled to get measured for compression on 12/28  Manual Therapy (25 min)      STM: L axilla and arm/areas of  cording      MLD: neck sequence, L trunk, L axilla, L breast, LUE        Compression  - pt measured for compression garment, awaiting delivery       There Ex (  minutes):  - instr to cont w/ exercises from screening         There Ex (10 minutes)  -PROM L shouder flexion and abduction  -side lying abduction and horiz abduction x 10 reps  (See pt instructions)  There Ex (5 minutes)  -Measured BUE AROM  -PROM L shoulder   There Ex (15 min)    Pulleys flex and abd x 10 each    Wall wash CW/CCW in flex and abd x 10 each      Pt asking when she can lift (wants to be able to lift her grand-dtr), discussed she can begin lifting light objects progress towards heavier objects slowly (to avoid injury).        Self-Care:  Management/Education: Explained Lymphedema diagnosis; informed patient of lymphedema precautions and risk reduction practices, and importance of skin care. Discussed and demonstrated bandaging and compression options including various vendors that can be utilized                  Assessment: Cording stretches w/ treatment to improve pt's ROM. Discussed w/ pt next treatment is in 2 weeks but if cording worsens to contact us to get in for treatment sooner (pt working so will need after 3pm)    Goals:   Goals (discussed and planned with patient involvement):      To be met in 12 visits:  1) Decrease swelling L breast/trunk by 2 cm to decrease risks of infection  2) Pt to be independent with self MLD, ROM exercises, and donning/doffing compression bandages/garments to facilitate swelling reduction and to be independent at self management once discharged  3) Increase shoulder AROM flex 160, abd 170 B to improve ease of dressing/bathing/and reaching overhead  4) Increase B UE strength to at least 4/5 to improve ease of lifting and performing household chores        Plan:    Frequency / Duration: Patient will be seen for 1-2 x/week or a total of 12 visits over a 90 day period. Frequency will decrease as symptoms  improve.      Treatment will include: Complete Decongestive Therapy: Manual Therapy, Self-Care/Home Management Training, Therapeutic Exercise, Neuromuscular Re-education, Orthotic Management and Training           Charges: MM2, ex1  Total Timed Treatment: 40 min  Total Treatment Time: 45 min

## 2024-01-03 ENCOUNTER — HOSPITAL ENCOUNTER (EMERGENCY)
Facility: HOSPITAL | Age: 49
Discharge: HOME | End: 2024-01-03
Attending: EMERGENCY MEDICINE
Payer: COMMERCIAL

## 2024-01-03 ENCOUNTER — APPOINTMENT (OUTPATIENT)
Dept: GENERAL RADIOLOGY | Facility: HOSPITAL | Age: 49
End: 2024-01-03
Attending: EMERGENCY MEDICINE
Payer: COMMERCIAL

## 2024-01-03 ENCOUNTER — TELEPHONE (OUTPATIENT)
Dept: HEMATOLOGY/ONCOLOGY | Facility: HOSPITAL | Age: 49
End: 2024-01-03

## 2024-01-03 VITALS
DIASTOLIC BLOOD PRESSURE: 69 MMHG | HEART RATE: 91 BPM | WEIGHT: 208 LBS | OXYGEN SATURATION: 98 % | RESPIRATION RATE: 16 BRPM | BODY MASS INDEX: 33.43 KG/M2 | SYSTOLIC BLOOD PRESSURE: 114 MMHG | HEIGHT: 66 IN | TEMPERATURE: 99 F

## 2024-01-03 DIAGNOSIS — D70.9 NEUTROPENIA, UNSPECIFIED TYPE (HCC): Primary | ICD-10-CM

## 2024-01-03 LAB
ANION GAP SERPL CALC-SCNC: 7 MMOL/L (ref 0–18)
BASOPHILS # BLD AUTO: 0 X10(3) UL (ref 0–0.2)
BASOPHILS NFR BLD AUTO: 0 %
BILIRUB UR QL: NEGATIVE
BUN BLD-MCNC: 7 MG/DL (ref 9–23)
BUN/CREAT SERPL: 12.7 (ref 10–20)
CALCIUM BLD-MCNC: 9 MG/DL (ref 8.7–10.4)
CHLORIDE SERPL-SCNC: 100 MMOL/L (ref 98–112)
CLARITY UR: CLEAR
CO2 SERPL-SCNC: 27 MMOL/L (ref 21–32)
COLOR UR: YELLOW
CREAT BLD-MCNC: 0.55 MG/DL
DEPRECATED RDW RBC AUTO: 37.9 FL (ref 35.1–46.3)
EGFRCR SERPLBLD CKD-EPI 2021: 114 ML/MIN/1.73M2 (ref 60–?)
EOSINOPHIL # BLD AUTO: 0.02 X10(3) UL (ref 0–0.7)
EOSINOPHIL NFR BLD AUTO: 4.2 %
ERYTHROCYTE [DISTWIDTH] IN BLOOD BY AUTOMATED COUNT: 11.1 % (ref 11–15)
FLUAV + FLUBV RNA SPEC NAA+PROBE: NEGATIVE
FLUAV + FLUBV RNA SPEC NAA+PROBE: NEGATIVE
GLUCOSE BLD-MCNC: 102 MG/DL (ref 70–99)
GLUCOSE UR-MCNC: NORMAL MG/DL
HCT VFR BLD AUTO: 30.4 %
HGB BLD-MCNC: 10.2 G/DL
HGB UR QL STRIP.AUTO: NEGATIVE
IMM GRANULOCYTES # BLD AUTO: 0 X10(3) UL (ref 0–1)
IMM GRANULOCYTES NFR BLD: 0 %
KETONES UR-MCNC: NEGATIVE MG/DL
LACTATE SERPL-SCNC: 1.4 MMOL/L (ref 0.5–2)
LEUKOCYTE ESTERASE UR QL STRIP.AUTO: NEGATIVE
LYMPHOCYTES # BLD AUTO: 0.39 X10(3) UL (ref 1–4)
LYMPHOCYTES NFR BLD AUTO: 81.3 %
MCH RBC QN AUTO: 31.5 PG (ref 26–34)
MCHC RBC AUTO-ENTMCNC: 33.6 G/DL (ref 31–37)
MCV RBC AUTO: 93.8 FL
MONOCYTES # BLD AUTO: 0.05 X10(3) UL (ref 0.1–1)
MONOCYTES NFR BLD AUTO: 10.4 %
NEUTROPHILS # BLD AUTO: 0.02 X10 (3) UL (ref 1.5–7.7)
NEUTROPHILS # BLD AUTO: 0.02 X10(3) UL (ref 1.5–7.7)
NEUTROPHILS NFR BLD AUTO: 4.1 %
NITRITE UR QL STRIP.AUTO: NEGATIVE
OSMOLALITY SERPL CALC.SUM OF ELEC: 276 MOSM/KG (ref 275–295)
PH UR: 5.5 [PH] (ref 5–8)
PLATELET # BLD AUTO: 148 10(3)UL (ref 150–450)
POTASSIUM SERPL-SCNC: 4.3 MMOL/L (ref 3.5–5.1)
PROT UR-MCNC: 20 MG/DL
RBC # BLD AUTO: 3.24 X10(6)UL
RSV RNA SPEC NAA+PROBE: NEGATIVE
S PYO AG THROAT QL: NEGATIVE
S PYO AG THROAT QL: NEGATIVE
SARS-COV-2 RNA RESP QL NAA+PROBE: NOT DETECTED
SODIUM SERPL-SCNC: 134 MMOL/L (ref 136–145)
SP GR UR STRIP: 1.03 (ref 1–1.03)
UROBILINOGEN UR STRIP-ACNC: NORMAL
WBC # BLD AUTO: 0.5 X10(3) UL (ref 4–11)

## 2024-01-03 PROCEDURE — 99284 EMERGENCY DEPT VISIT MOD MDM: CPT

## 2024-01-03 PROCEDURE — 87880 STREP A ASSAY W/OPTIC: CPT

## 2024-01-03 PROCEDURE — 85025 COMPLETE CBC W/AUTO DIFF WBC: CPT | Performed by: EMERGENCY MEDICINE

## 2024-01-03 PROCEDURE — 85060 BLOOD SMEAR INTERPRETATION: CPT | Performed by: EMERGENCY MEDICINE

## 2024-01-03 PROCEDURE — 83605 ASSAY OF LACTIC ACID: CPT | Performed by: EMERGENCY MEDICINE

## 2024-01-03 PROCEDURE — 87040 BLOOD CULTURE FOR BACTERIA: CPT | Performed by: EMERGENCY MEDICINE

## 2024-01-03 PROCEDURE — 71045 X-RAY EXAM CHEST 1 VIEW: CPT | Performed by: EMERGENCY MEDICINE

## 2024-01-03 PROCEDURE — 0241U SARS-COV-2/FLU A AND B/RSV BY PCR (GENEXPERT): CPT

## 2024-01-03 PROCEDURE — 36415 COLL VENOUS BLD VENIPUNCTURE: CPT

## 2024-01-03 PROCEDURE — 0241U SARS-COV-2/FLU A AND B/RSV BY PCR (GENEXPERT): CPT | Performed by: EMERGENCY MEDICINE

## 2024-01-03 PROCEDURE — 81001 URINALYSIS AUTO W/SCOPE: CPT | Performed by: EMERGENCY MEDICINE

## 2024-01-03 PROCEDURE — 80048 BASIC METABOLIC PNL TOTAL CA: CPT | Performed by: EMERGENCY MEDICINE

## 2024-01-03 RX ORDER — LEVOFLOXACIN 750 MG/1
750 TABLET, FILM COATED ORAL DAILY
Qty: 10 TABLET | Refills: 0 | Status: SHIPPED | OUTPATIENT
Start: 2024-01-03 | End: 2024-01-10

## 2024-01-03 NOTE — ED PROVIDER NOTES
Patient Seen in: [facility] Emergency Department    History     Chief Complaint   Patient presents with    Nausea/Vomiting/Diarrhea    Cough/URI     Stated Complaint: From [facility], runny nose, fever, n/v/d, cough    HPI    Patient here today with  2 days of fever, cough, runny nose.  Had some nausea as well and mild loose stool.  Patient has breast cancer had first chemo last week.  No stiff neck.  No urinary symptoms. No known sick conctacts.  Past Medical History:   Diagnosis Date    High blood pressure     Hypertension     Trigeminal neuralgia of left side of face        Past Surgical History:   Procedure Laterality Date    LUMPECTOMY LEFT  2023    NEEDLE BIOPSY RIGHT Right     bx done at age 20yrs old          x2            Family History   Problem Relation Age of Onset    Breast Cancer Mother 34    Cancer Paternal Grandfather         unknown type    Ovarian Cancer Neg        Social History     Socioeconomic History    Marital status:    Tobacco Use    Smoking status: Never    Smokeless tobacco: Never   Substance and Sexual Activity    Alcohol use: Never    Drug use: Never       Review of Systems    Positive for stated complaint: From [facility], runny nose, fever, n/v/d, cough  Other systems are as noted in HPI.  Constitutional and vital signs reviewed.      All other systems reviewed and negative except as noted above.    PSFH elements reviewed from today and agreed except as otherwise stated in HPI.    Physical Exam     ED Triage Vitals [24 0942]   /70   Pulse 105   Resp 14   Temp 99.1 °F (37.3 °C)   Temp src Oral   SpO2 99 %   O2 Device None (Room air)       Current:/69   Pulse 91   Temp 99.1 °F (37.3 °C) (Oral)   Resp 16   Ht 167.6 cm (5' 6\")   Wt 94.3 kg   LMP 12/15/2023 (Approximate)   SpO2 98%   BMI 33.57 kg/m²       GENERAL: appears tired non toxic    NOSE: nasal turbinates boggy  THROAT:post pharynx injected  LUNGS: no resp distress, increased upper  airway sounds, clear at the bases  SKIN: good skin turgor, no obvious rashes  NECK: supple, no adenopathy, no thyromegaly  CARDIO: RRR without murmur  EXTREMITIES: no cyanosis, clubbing or edema  GI: soft, non-tender, normal bowel sounds  HEAD: normocephalic, atraumatic  EYES: sclera non icteric bilateral, conjunctiva clear    Ddx- neutropenia vs. Viral syndrome vs. Strep vs pneumonia  ED Course     Labs Reviewed   BASIC METABOLIC PANEL (8) - Abnormal; Notable for the following components:       Result Value    Glucose 102 (*)     Sodium 134 (*)     BUN 7 (*)     All other components within normal limits   URINALYSIS WITH CULTURE REFLEX - Abnormal; Notable for the following components:    Protein Urine 20 (*)     All other components within normal limits   CBC W/ DIFFERENTIAL - Abnormal; Notable for the following components:    WBC 0.5 (*)     RBC 3.24 (*)     HGB 10.2 (*)     HCT 30.4 (*)     .0 (*)     Neutrophil Absolute Prelim 0.02 (*)     All other components within normal limits   LACTIC ACID, PLASMA - Normal   POCT RAPID STREP - Normal   POCT RAPID STREP - Normal   SARS-COV-2/FLU A AND B/RSV BY PCR (GENEXPERT) - Normal    Narrative:     This test is intended for the qualitative detection and differentiation of SARS-CoV-2, influenza A, influenza B, and respiratory syncytial virus (RSV) viral RNA in nasopharyngeal or nares swabs from individuals suspected of respiratory viral infection consistent with COVID-19 by their healthcare provider. Signs and symptoms of respiratory viral infection due to SARS-CoV-2, influenza, and RSV can be similar.    Test performed using the Xpert Xpress SARS-CoV-2/FLU/RSV (real time RT-PCR)  assay on the GeneXpert instrument, [employer], [place] [address].   This test is being used under the Food and Drug Administration's Emergency Use Authorization.    The authorized Fact Sheet for Healthcare Providers for this assay is available upon request from the laboratory.   CBC WITH  DIFFERENTIAL WITH PLATELET    Narrative:     The following orders were created for panel order CBC With Differential With Platelet.  Procedure                               Abnormality         Status                     ---------                               -----------         ------                     CBC W/ DIFFERENTIAL[[id number]]          Abnormal            Preliminary result           Please view results for these tests on the individual orders.   SCAN SLIDE   MD BLOOD SMEAR CONSULT   BLOOD CULTURE   BLOOD CULTURE       MDM     XR CHEST AP PORTABLE  (CPT=71045)    Result Date: 1/3/2024  CONCLUSION:  Negative for radiographically evident acute intrathoracic process.    Dictated by (CST): David Mortensen MD on 1/03/2024 at 11:42 AM     Finalized by (CST): David Mortensen MD on 1/03/2024 at 11:44 AM           I reviewed xray noted no infiltrates no pneumothorax      Medical Decision Making  Problems Addressed:  Neutropenia, unspecified type (HCC): acute illness or injury     Details: Spoke with on call oncology at this point will send home, she received colony stim so counts should be going up in next 48 hours.  Cultures cooking.  Cover with levaquin for week.  Return if worse.    Amount and/or Complexity of Data Reviewed  Labs: ordered. Decision-making details documented in ED Course.  Radiology: ordered and independent interpretation performed. Decision-making details documented in ED Course.  Discussion of management or test interpretation with external provider(s): Tylenol, motrin recommended.      Risk  OTC drugs.  Prescription drug management.            Disposition and Plan     Clinical Impression:  1. Neutropenia, unspecified type (HCC)        Disposition:  Discharge    Follow-up:  Montrell Heaton MD  [address] [facility] [id number] [phone]    Follow up      Kaykay Fraser MD  [address] [facility] [id number] [phone]    Follow up        Medications Prescribed:  Discharge  Medication List as of 1/3/2024  1:17 PM        START taking these medications    Details   levoFLOXacin 750 MG Oral Tab Take 1 tablet (750 mg total) by mouth daily for 7 days., Normal, Disp-10 tablet, R-0

## 2024-01-03 NOTE — TELEPHONE ENCOUNTER
Patient came in to  stating she has a fever, called Julia SANTIZO, she stated to send a message to triage.....    Patient has 100.5 fever and sore throat and states it is hard to swallow. She states she has taken tylenol twice and no change. Call back number Is  [phone]    Please advise on refill  Last refill 2/3/22

## 2024-01-03 NOTE — ED INITIAL ASSESSMENT (HPI)
Patient presents with fever, cough, and nausea. Patient due to receive chemotherapy treatment today.

## 2024-01-03 NOTE — TELEPHONE ENCOUNTER
Toxicities: C1 D1 Doxorubicin/Cyclophosphamide/Pegfilgrastim-jmdb on 12/27/2023    Fever/Runny Nose/Headache/Sore Throat/Dry Cough/Fatigue/Nausea/Diarrhea    Patient reports she started to not feel well yesterday. She had a fever or 100.5, runny nose, sore throat, headache, dry cough, fatigue, nausea and diarrhea. She took tylenol. This morning she has another fever of 100.5. She continues to have a sore throat, headache, dry cough, fatigue and nausea. No diarrhea this morning. She denies urgency, frequency or burning with urination.     I explained to Alisia that we can test her for Covid, flu, RSV or strep in the office. She will also need a work up for fever since she received chemotherapy on 12/27/2023. I asked her to please present to the [facility] ER now. She agreed. Report called to [place] [facility] Triage RN.

## 2024-01-09 ENCOUNTER — OFFICE VISIT (OUTPATIENT)
Dept: HEMATOLOGY/ONCOLOGY | Facility: HOSPITAL | Age: 49
End: 2024-01-09
Attending: NURSE PRACTITIONER
Payer: COMMERCIAL

## 2024-01-09 VITALS
TEMPERATURE: 98 F | BODY MASS INDEX: 34.16 KG/M2 | HEIGHT: 65 IN | RESPIRATION RATE: 16 BRPM | SYSTOLIC BLOOD PRESSURE: 107 MMHG | DIASTOLIC BLOOD PRESSURE: 70 MMHG | HEART RATE: 97 BPM | OXYGEN SATURATION: 97 % | WEIGHT: 205 LBS

## 2024-01-09 DIAGNOSIS — Z17.0 MALIGNANT NEOPLASM OF CENTRAL PORTION OF LEFT BREAST IN FEMALE, ESTROGEN RECEPTOR POSITIVE  (HCC): Primary | ICD-10-CM

## 2024-01-09 DIAGNOSIS — C50.112 MALIGNANT NEOPLASM OF CENTRAL PORTION OF LEFT BREAST IN FEMALE, ESTROGEN RECEPTOR POSITIVE  (HCC): Primary | ICD-10-CM

## 2024-01-09 DIAGNOSIS — Z09 CHEMOTHERAPY FOLLOW-UP EXAMINATION: ICD-10-CM

## 2024-01-09 LAB
ALBUMIN SERPL-MCNC: 4.8 G/DL (ref 3.2–4.8)
ALBUMIN/GLOB SERPL: 1.4 {RATIO} (ref 1–2)
ALP LIVER SERPL-CCNC: 99 U/L
ALT SERPL-CCNC: 29 U/L
ANION GAP SERPL CALC-SCNC: 6 MMOL/L (ref 0–18)
AST SERPL-CCNC: 19 U/L (ref ?–34)
BASOPHILS # BLD: 0 X10(3) UL (ref 0–0.2)
BASOPHILS NFR BLD: 0 %
BILIRUB SERPL-MCNC: 0.2 MG/DL (ref 0.3–1.2)
BUN BLD-MCNC: 8 MG/DL (ref 9–23)
BUN/CREAT SERPL: 11.9 (ref 10–20)
CALCIUM BLD-MCNC: 9.6 MG/DL (ref 8.7–10.4)
CHLORIDE SERPL-SCNC: 103 MMOL/L (ref 98–112)
CO2 SERPL-SCNC: 29 MMOL/L (ref 21–32)
CREAT BLD-MCNC: 0.67 MG/DL
DEPRECATED RDW RBC AUTO: 37.9 FL (ref 35.1–46.3)
EGFRCR SERPLBLD CKD-EPI 2021: 108 ML/MIN/1.73M2 (ref 60–?)
EOSINOPHIL # BLD: 0 X10(3) UL (ref 0–0.7)
EOSINOPHIL NFR BLD: 0 %
ERYTHROCYTE [DISTWIDTH] IN BLOOD BY AUTOMATED COUNT: 11.2 % (ref 11–15)
GLOBULIN PLAS-MCNC: 3.5 G/DL (ref 2.8–4.4)
GLUCOSE BLD-MCNC: 107 MG/DL (ref 70–99)
HCT VFR BLD AUTO: 34.4 %
HGB BLD-MCNC: 11.5 G/DL
LYMPHOCYTES NFR BLD: 1.02 X10(3) UL (ref 1–4)
LYMPHOCYTES NFR BLD: 12 %
MCH RBC QN AUTO: 31.3 PG (ref 26–34)
MCHC RBC AUTO-ENTMCNC: 33.4 G/DL (ref 31–37)
MCV RBC AUTO: 93.7 FL
METAMYELOCYTES # BLD: 0.17 X10(3) UL
METAMYELOCYTES NFR BLD: 2 %
MONOCYTES # BLD: 0.34 X10(3) UL (ref 0.1–1)
MONOCYTES NFR BLD: 4 %
MORPHOLOGY: NORMAL
MYELOCYTES # BLD: 0.51 X10(3) UL
MYELOCYTES NFR BLD: 6 %
NEUTROPHILS # BLD AUTO: 4.98 X10 (3) UL (ref 1.5–7.7)
NEUTROPHILS NFR BLD: 76 %
NEUTS HYPERSEG # BLD: 6.46 X10(3) UL (ref 1.5–7.7)
NRBC BLD MANUAL-RTO: 1 %
OSMOLALITY SERPL CALC.SUM OF ELEC: 285 MOSM/KG (ref 275–295)
PLATELET # BLD AUTO: 386 10(3)UL (ref 150–450)
PLATELET MORPHOLOGY: NORMAL
POTASSIUM SERPL-SCNC: 4.1 MMOL/L (ref 3.5–5.1)
PROT SERPL-MCNC: 8.3 G/DL (ref 5.7–8.2)
RBC # BLD AUTO: 3.67 X10(6)UL
SODIUM SERPL-SCNC: 138 MMOL/L (ref 136–145)
TOTAL CELLS COUNTED BLD: 100
WBC # BLD AUTO: 8.5 X10(3) UL (ref 4–11)

## 2024-01-09 PROCEDURE — 85027 COMPLETE CBC AUTOMATED: CPT

## 2024-01-09 PROCEDURE — 85007 BL SMEAR W/DIFF WBC COUNT: CPT

## 2024-01-09 PROCEDURE — 80053 COMPREHEN METABOLIC PANEL: CPT

## 2024-01-09 PROCEDURE — 36415 COLL VENOUS BLD VENIPUNCTURE: CPT

## 2024-01-09 PROCEDURE — 99215 OFFICE O/P EST HI 40 MIN: CPT | Performed by: NURSE PRACTITIONER

## 2024-01-09 PROCEDURE — 85025 COMPLETE CBC W/AUTO DIFF WBC: CPT

## 2024-01-09 RX ORDER — DOXORUBICIN HYDROCHLORIDE 2 MG/ML
50 INJECTION, SOLUTION INTRAVENOUS ONCE
Status: CANCELLED | OUTPATIENT
Start: 2024-01-10

## 2024-01-09 NOTE — PROGRESS NOTES
HPI   Alisia Berg is a 48 year old female here at the request of NORBERTO SERNA MD for evaluation of   Encounter Diagnoses   Name Primary?    Malignant neoplasm of central portion of left breast in female, estrogen receptor positive  (HCC) Yes    Chemotherapy follow-up examination        Patient right here status post left-sided lumpectomy with axillary lymph node dissection on 11/6/2023.     Feels arm feels stretching when lifting.  Not able to lift fully yet.      S/p cycle 1 DD AC complicated by neutropenic fevers ER visit. Discharged on levoflaxin 750 mg x 7 days.     After chemo hands, fingertips and feet -red and feels swollen and burning x 5 days.    After started antibiotics L lower jaw became swollen. Pt had a dental cleaning prior to chemo and said she would need a follow up.  Currently improved.     LMP 10/23/23, states had 2 periods, 2 weeks apart.   Prior to that was in August.    ECOG PS  0    Review of Systems:   Review of Systems   Constitutional:  Positive for fever.   HENT:   Positive for sore throat.    Respiratory:  Negative for cough.    Gastrointestinal:  Positive for nausea (x 5 days). Negative for constipation and diarrhea.   Genitourinary:  Negative for frequency.    Musculoskeletal:  Negative for back pain.   Neurological:  Positive for light-headedness (at times).   Psychiatric/Behavioral:  Negative for sleep disturbance.            Current Outpatient Medications   Medication Sig Dispense Refill    levoFLOXacin 750 MG Oral Tab Take 1 tablet (750 mg total) by mouth daily for 7 days. 10 tablet 0    prochlorperazine (COMPAZINE) 10 mg tablet Take 1 tablet (10 mg total) by mouth every 8 (eight) hours as needed for Nausea. 30 tablet 3    ondansetron (ZOFRAN) 8 MG tablet Take 1 tablet (8 mg total) by mouth every 8 (eight) hours as needed for Nausea. 30 tablet 3    carBAMazepine 200 MG Oral Tab Take 1 tablet (200 mg total) by mouth 2 (two) times daily.      losartan 50 MG Oral Tab  Take 1 tablet (50 mg total) by mouth daily.       Allergies:   No Known Allergies    Past Medical History:   Diagnosis Date    High blood pressure     Hypertension     Trigeminal neuralgia of left side of face      Past Surgical History:   Procedure Laterality Date    LUMPECTOMY LEFT  2023    NEEDLE BIOPSY RIGHT Right     bx done at age 20yrs old          x2     Social History     Socioeconomic History    Marital status:    Tobacco Use    Smoking status: Never    Smokeless tobacco: Never   Substance and Sexual Activity    Alcohol use: Never    Drug use: Never       Family History   Problem Relation Age of Onset    Breast Cancer Mother 34    Cancer Paternal Grandfather         unknown type    Ovarian Cancer Neg          PHYSICAL EXAM:    /70 (BP Location: Right arm, Patient Position: Sitting, Cuff Size: large)   Pulse 97   Temp 98.4 °F (36.9 °C) (Oral)   Resp 16   Ht 1.651 m (5' 5\")   Wt 93 kg (205 lb)   LMP 12/15/2023 (Approximate)   SpO2 97%   BMI 34.11 kg/m²   Wt Readings from Last 6 Encounters:   24 94.3 kg (208 lb)   23 94.9 kg (209 lb 4.8 oz)   11/15/23 97.1 kg (214 lb)   23 97.1 kg (214 lb)   11/15/23 97.1 kg (214 lb)   11/15/23 97.2 kg (214 lb 3.2 oz)     Physical Exam  General: Patient is alert, not in acute distress.  HEENT: EOMs intact. PERRL. Oropharynx is clear. L lower gum line no inflammation  Neck: No JVD. No palpable lymphadenopathy. Neck is supple.  Chest: Clear to auscultation.  Heart: Regular rate and rhythm.   Abdomen: Soft, non tender with good bowel sounds.  Extremities: No edema.  Neurological: Grossly intact.   Lymphatics: There is no palpable lymphadenopathy throughout in the cervical, supraclavicular, axillary, or inguinal regions.  Psych/Depression: nl          ASSESSMENT/PLAN:     1. Malignant neoplasm of central portion of left breast in female, estrogen receptor positive  (HCC)    2. Chemotherapy follow-up examination        1. Malignant  neoplasm of central portion of left breast in female, estrogen receptor positive    Cancer Staging   Malignant neoplasm of central portion of left breast in female, estrogen receptor positive  (HCC)  Staging form: Breast, AJCC 8th Edition  - Clinical stage from 9/29/2023: Stage IB (cT1c, cN1(f), cM0, G2, ER+, MO+, HER2-) - Signed by Montrell Heaton MD on 9/29/2023  - Pathologic stage from 11/15/2023: Stage IB (pT1c, pN2a, cM0, G2, ER+, MO+, HER2-) - Signed by Montrell Heaton MD on 11/15/2023    -Genetic testing was negative.    -Patient underwent a left-sided lumpectomy with sentinel lymph node which was positive, and completion axillary dissection on 11/6/2023.  She had 4 of 25 positive lymph nodes.    -Discussed with patient standard of care is to proceed with adjuvant chemotherapy within 4 to 8 weeks postoperatively.  Given her extensive beatriz involvement, recommend to proceed with adjuvant chemotherapy with dose dense Adriamycin Cytoxan followed by weekly Taxol.  Patient will have baseline cardiac function assessment with either a TTE or MUGA.  Also recommend port placement for safe administration of treatment. Briefly discussed potential SE of treatment, will discuss further on teaching session.      S/p cycle 1 DD AC complicated by neutropenic fevers requiring ER visit, treated with Levofloxacin       Proceed with cycle 2 DD AC with dose reduction d/t neutropenia and Grade 1 PPE            - After patient completes chemotherapy, she will proceed with radiation treatment and will certainly benefit from adjuvant endocrine therapy, based on her hormone status.  Baseline hormone levels obtained, and patient appears to be in the postmenopausal range.  We will repeat this again in 1 year after last period.  Duration of adjuvant endocrine therapy will be for at least 5 years, however this could be up to 10 years.  Determination on duration of adjuvant endocrine therapy pending on some more emerging data regarding  which patient population benefits from extended endocrine therapy.  Given his beatriz status, discussed with patient that would favor up to 10 years.    No follow-ups on file.    No orders of the defined types were placed in this encounter.    MDM high risk.  Results From Past 48 Hours:  Recent Results (from the past 48 hour(s))   Comp Metabolic Panel (14)    Collection Time: 01/09/24 11:15 AM   Result Value Ref Range    Glucose 107 (H) 70 - 99 mg/dL    Sodium 138 136 - 145 mmol/L    Potassium 4.1 3.5 - 5.1 mmol/L    Chloride 103 98 - 112 mmol/L    CO2 29.0 21.0 - 32.0 mmol/L    Anion Gap 6 0 - 18 mmol/L    BUN 8 (L) 9 - 23 mg/dL    Creatinine 0.67 0.55 - 1.02 mg/dL    BUN/CREA Ratio 11.9 10.0 - 20.0    Calcium, Total 9.6 8.7 - 10.4 mg/dL    Calculated Osmolality 285 275 - 295 mOsm/kg    eGFR-Cr 108 >=60 mL/min/1.73m2    ALT 29 10 - 49 U/L    AST 19 <=34 U/L    Alkaline Phosphatase 99 39 - 100 U/L    Bilirubin, Total 0.2 (L) 0.3 - 1.2 mg/dL    Total Protein 8.3 (H) 5.7 - 8.2 g/dL    Albumin 4.8 3.2 - 4.8 g/dL    Globulin  3.5 2.8 - 4.4 g/dL    A/G Ratio 1.4 1.0 - 2.0    Patient Fasting for CMP? Patient not present    CBC W/ DIFFERENTIAL    Collection Time: 01/09/24 11:15 AM   Result Value Ref Range    WBC 8.5 4.0 - 11.0 x10(3) uL    RBC 3.67 (L) 3.80 - 5.30 x10(6)uL    HGB 11.5 (L) 12.0 - 16.0 g/dL    HCT 34.4 (L) 35.0 - 48.0 %    MCV 93.7 80.0 - 100.0 fL    MCH 31.3 26.0 - 34.0 pg    MCHC 33.4 31.0 - 37.0 g/dL    RDW-SD 37.9 35.1 - 46.3 fL    RDW 11.2 11.0 - 15.0 %    .0 150.0 - 450.0 10(3)uL    Neutrophil Absolute Prelim 4.98 1.50 - 7.70 x10 (3) uL       Imaging & Referrals:  None

## 2024-01-10 ENCOUNTER — OFFICE VISIT (OUTPATIENT)
Dept: HEMATOLOGY/ONCOLOGY | Facility: HOSPITAL | Age: 49
End: 2024-01-10
Attending: NURSE PRACTITIONER
Payer: COMMERCIAL

## 2024-01-10 VITALS
DIASTOLIC BLOOD PRESSURE: 81 MMHG | TEMPERATURE: 98 F | SYSTOLIC BLOOD PRESSURE: 119 MMHG | HEART RATE: 90 BPM | OXYGEN SATURATION: 97 % | RESPIRATION RATE: 16 BRPM

## 2024-01-10 DIAGNOSIS — Z45.2 ENCOUNTER FOR CENTRAL LINE CARE: ICD-10-CM

## 2024-01-10 DIAGNOSIS — Z17.0 MALIGNANT NEOPLASM OF CENTRAL PORTION OF LEFT BREAST IN FEMALE, ESTROGEN RECEPTOR POSITIVE  (HCC): Primary | ICD-10-CM

## 2024-01-10 DIAGNOSIS — C50.112 MALIGNANT NEOPLASM OF CENTRAL PORTION OF LEFT BREAST IN FEMALE, ESTROGEN RECEPTOR POSITIVE  (HCC): Primary | ICD-10-CM

## 2024-01-10 PROCEDURE — 96375 TX/PRO/DX INJ NEW DRUG ADDON: CPT

## 2024-01-10 PROCEDURE — 96372 THER/PROPH/DIAG INJ SC/IM: CPT

## 2024-01-10 PROCEDURE — 96367 TX/PROPH/DG ADDL SEQ IV INF: CPT

## 2024-01-10 PROCEDURE — 96411 CHEMO IV PUSH ADDL DRUG: CPT

## 2024-01-10 PROCEDURE — 96413 CHEMO IV INFUSION 1 HR: CPT

## 2024-01-10 RX ORDER — HEPARIN SODIUM (PORCINE) LOCK FLUSH IV SOLN 100 UNIT/ML 100 UNIT/ML
SOLUTION INTRAVENOUS
Status: DISPENSED
Start: 2024-01-10 | End: 2024-01-10

## 2024-01-10 RX ORDER — DOXORUBICIN HYDROCHLORIDE 2 MG/ML
50 INJECTION, SOLUTION INTRAVENOUS ONCE
Status: COMPLETED | OUTPATIENT
Start: 2024-01-10 | End: 2024-01-10

## 2024-01-10 RX ADMIN — DOXORUBICIN HYDROCHLORIDE 104 MG: 2 INJECTION, SOLUTION INTRAVENOUS at 09:29:00

## 2024-01-10 NOTE — PROGRESS NOTES
Pt here for C2D1 Drug(s)AC and Fulphilla.      Arrives Ambulating independently, accompanied by Self     Patient was evaluated today by Treatment Nurse.    Oral medications included in this regimen:  no    Patient confirms comprehension of cancer treatment schedule:  yes    Pregnancy screening:  Denies possibility of pregnancy    Modifications in dose or schedule:  No    Medications appearance and physical integrity checked by RN: yes.    Chemotherapy IV pump settings verified by 2 RNs:  Yes.  Frequency of blood return and site check throughout administration: Prior to administration, Prior to each drug, Every 2-3 ml IVP, and At completion of therapy     Infusion/treatment outcome:  patient tolerated treatment without incident    Education Record    Learner:  Patient  Barriers / Limitations:  Emotional factors, Fatigue, and Denial  Method:  Brief focused and Discussion  Education / instructions given:  reinforce anti nausea protocol       Outcome:  Needs reinforcement    Discharged Home, Ambulating independently, accompanied by:Self    Patient/family verbalized understanding of future appointments: by printed AVS

## 2024-01-16 ENCOUNTER — OFFICE VISIT (OUTPATIENT)
Dept: PHYSICAL THERAPY | Facility: HOSPITAL | Age: 49
End: 2024-01-16
Attending: SURGERY
Payer: COMMERCIAL

## 2024-01-16 PROCEDURE — 97110 THERAPEUTIC EXERCISES: CPT

## 2024-01-16 PROCEDURE — 97140 MANUAL THERAPY 1/> REGIONS: CPT

## 2024-01-16 NOTE — PROGRESS NOTES
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)  Decreased ROM of left shoulder (M25.612)        Date of onset: 11/6/2023 Evaluation Date: 12/13/2023     Physical Therapy Lymphedema Daily Note    Precautions:  will be starting chemo and will need radiation   Education or treatment limitation: chemo  Rehab Potential:good    Past Medical History:   Diagnosis Date    High blood pressure     Hypertension     Trigeminal neuralgia of left side of face          Pain:  2/10 LUE/axilla/chest       Subjective: \"some tightness but improving.\"      Objective:  1/16/2024  AROM of shoulders: (ROM on left limited by cording)    Flex: R 160, L 155   Abd: R 160, L 155  Cording L axilla down to forearm  Decreased scar mobility L breast  1/2/2024  AROM of shoulders: (ROM on left limited by cording)    Flex: R 160, L 150   Abd: R 160, L 150  Cording L axilla down to forearm  Decreased scar mobility L breast       12/26/2023  AROM/Strength:      Right AROM Left AROM Right Strength Left Strength   Shoulder                 Flexion 150 * pain with port placement 160 *improved nt nt        Abduction 155 *pain with port placement 155 * improved nt nt        IR WFL WFL nt nt        ER NT NT NT NT   Elbow                Flexion WFL WFL NT NT        Extension WFL WFL NT NT               Scapular Strength                Rhomboids     NT NT         Middle Trap     NT NT        Serratus Anterior     NT NT                 12/13/2023 (Evaluation):  Sensation:  decreased L axilla      AROM/Strength:      Right AROM Left AROM Right Strength Left Strength   Shoulder                 Flexion 160 140 nt nt        Abduction 160 110 nt nt        IR WFL WFL nt nt        ER NT NT NT NT   Elbow                Flexion WFL WFL NT NT        Extension WFL WFL NT NT               Scapular Strength                Rhomboids     NT NT         Middle Trap     NT NT        Serratus Anterior     NT NT                         Edema/Tissue Observations:    - swelling LUE  - swelling L breast and trunk               - trunk circumference (Level of nipples);  113.1 cm   - decreased scar mobility L axilla and breast  - cording L axilla, extends into L breast (lateral down toward inferior breast) and into L UE (down to mid forearm)   Stemmer's Sign: negative         Arm Volume Measurements:        12/5/2023     6:00 PM 10/25/2023     3:00 PM   Lymphedema Calculations   DATE MEASURED 12/5/2023 10/25/2023   LOCATION/MEASUREMENTS LUE LUE   PATIENT POSITION  supine 1 pillow supine 1 pillow   LIMB POSITION 75 deg abduction 75 deg abduction   STARTING POINT 17cm from 3rd cuticle 17cm from 3rd cuticle   RIGHT HAND VOLUME 21.7cm across palm 21.7cm across palm   LEFT HAND VOLUME 21.7cm across palm 21.7cm across palm   MEASUREMENT A 16.5 16.5   MEASUREMENT B 20.1 20.2   MEASUREMENT C 24.5 24.7   MEASUREMENT D 27.8 27.8   MEASUREMENT E 28.5 28.1   MEASUREMENT F 31 31   MEASUREMENT G 32.8 32.8   MEASUREMENT H 35.8 36   MEASUREMENT I 41.3 41.2    TOTAL VOLUME  2738.83150 2738.81805   DATE MEASURED 12/5/2023 10/25/2023   LOCATION/MEASUREMENTS RUE RUE   MEASUREMENT A 17.1 17   MEASUREMENT B 20.4 20.6   MEASUREMENT C 24.7 24.8   MEASUREMENT D 27.5 27.7   MEASUREMENT E 28.2 28.2   MEASUREMENT F 30.3 30.5   MEASUREMENT G 32 32.6   MEASUREMENT H 35.6 35.8   MEASUREMENT I 40.2 39.8    TOTAL VOLUME  2677.61495 2705.84021   % DIFFERENCE 2.61033 1.2097                           Lymphedema Life Impact Scale: Evaluation Score 12/13/2023: LLIS Score Evaluation: 29.5 % impaired. (0% is no impairment, 100% total impairment)           Today’s Treatment and Response:   Date 12/13/2023 Date: 12/19/2023 Date: 12/26/2023 Date: 1/2/2023 Date: 1/16/2024 Date: *   Visit # 1/12  O expires 12/31/2023     Certification From: 12/13/2023  To:3/12/2024     Visit # 2/12  O expires 12/31/2023    Certification From: 12/13/2023  To:3/12/2024  Visit: #3/12  SHEILA  expires 12/31/2023    Certification From: 12/13/2023  To:3/12/2024  Visit: #4 - CORRECTED  HMO 1/5 expires 3/30/2024    Certification From: 12/13/2023  To:3/12/2024      Visit: #5  HMO 2/5 expires 3/30/2024      Certification From: 12/13/2023  To:3/12/2024  Visit: #6/*   Manual Therapy (40 minutes)     STM: L axilla/areas of cording. Several cavitations noted     Scar massage L axilla and breast      MLD: neck sequence, R axilla, A-A (Ant), L inguinal, L A-I, L axilla, L breast       Compression:  Discussed w/ patient the benefit of prophylactic compression sleeve. Order added for compression sleeve, [facility] referral for compression sleeve and gauntlet.  \"Prophylactic use of compression sleeves compared with the control group reduced and delayed the occurrence of arm swelling in women at high risk for lymphedema in the first year after surgery for breast cancer.\"  Mat et al. Prophylactic Use of Compression Sleeves Reduces the Incidence of Arm Swelling in Women at High Risk of Breast Cancer-Related Lymphedema: A Randomized Controlled Trial. Journal of Clinical Oncology. Accepted on January 7, 2022 and published at [email or web address] on February 2, 2022: DOI [email or web address]                 Manual therapy (35 minutes)      STM: L axilla/areas of cording. Several cavitations noted again this session in axilla/upper arm  *cording visible and palpable in forearm but unable to release     Scar massage L axilla and breast      MLD: neck sequence, R axilla, A-A (Ant), L inguinal, L A-I, L axilla, L breast      Compression:  -handed pt the \"order\" and she will bring to her PCP to get referral generated.      Manual therapy (40 minutes)        STM: L axilla/areas of cording.No cavitations noted this session in axilla/upper arm  *cording visible and palpable in forearm but unable to release     Scar massage L axilla and breast      MLD: neck  sequence, R axilla, A-A (Ant), L inguinal, L A-I, L axilla, L breast      Compression:  -pt received letter stating that DME is approved.  Pt scheduled to get measured for compression on 12/28  Manual Therapy (25 min)      STM: L axilla and arm/areas of cording      MLD: neck sequence, L trunk, L axilla, L breast, LUE        Compression  - pt measured for compression garment, awaiting delivery  Manual  therapy (40 minutes)        STM: L axilla and arm/areas of cording      MLD: neck sequence, L trunk, L axilla, L breast, LUE      Compression:  -messaged vendor today to get an update on compression garments     There Ex (  minutes):  - instr to cont w/ exercises from screening         There Ex (10 minutes)  -PROM L shouder flexion and abduction  -side lying abduction and horiz abduction x 10 reps  (See pt instructions)  There Ex (5 minutes)  -Measured BUE AROM  -PROM L shoulder   There Ex (15 min)    Pulleys flex and abd x 10 each    Wall wash CW/CCW in flex and abd x 10 each      Pt asking when she can lift (wants to be able to lift her grand-dtr), discussed she can begin lifting light objects progress towards heavier objects slowly (to avoid injury).   There ex (10 minutes)  -PROM L shouder flexion and abduction  -side lying abduction and horiz abduction x 10 reps     Self-Care:  Management/Education: Explained Lymphedema diagnosis; informed patient of lymphedema precautions and risk reduction practices, and importance of skin care. Discussed and demonstrated bandaging and compression options including various vendors that can be utilized                  Assessment: Cording stretches w/ treatment to improve pt's ROM. Discussed w/ pt next treatment is in 2 weeks but if cording worsens to contact us to get in for treatment sooner (pt working so will need after 3pm)    Goals:   Goals (discussed and planned with patient involvement):      To be met in 12 visits:  1) Decrease swelling L breast/trunk by 2 cm to decrease  risks of infection - working towards  2) Pt to be independent with self MLD, ROM exercises, and donning/doffing compression bandages/garments to facilitate swelling reduction and to be independent at self management once discharged - working towards  3) Increase shoulder AROM flex 160, abd 170 B to improve ease of dressing/bathing/and reaching overhead - working towards  4) Increase B UE strength to at least 4/5 to improve ease of lifting and performing household chores - working towards        Plan:    Frequency / Duration: Patient will be seen for 1-2 x/week or a total of 12 visits over a 90 day period. Frequency will decrease as symptoms improve.      Treatment will include: Complete Decongestive Therapy: Manual Therapy, Self-Care/Home Management Training, Therapeutic Exercise, Neuromuscular Re-education, Orthotic Management and Training           Charges: MM3, Ex1 Total Timed Treatment: 50  min  Total Treatment Time: 50  min

## 2024-01-22 ENCOUNTER — OFFICE VISIT (OUTPATIENT)
Dept: PHYSICAL THERAPY | Facility: HOSPITAL | Age: 49
End: 2024-01-22
Attending: SURGERY
Payer: COMMERCIAL

## 2024-01-22 PROCEDURE — 97110 THERAPEUTIC EXERCISES: CPT

## 2024-01-22 PROCEDURE — 97140 MANUAL THERAPY 1/> REGIONS: CPT

## 2024-01-22 NOTE — PROGRESS NOTES
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)  Decreased ROM of left shoulder (M25.612)        Date of onset: 11/6/2023 Evaluation Date: 12/13/2023     Physical Therapy Lymphedema Daily Note    Precautions:  will be starting chemo and will need radiation   Education or treatment limitation: chemo  Rehab Potential:good    Past Medical History:   Diagnosis Date    High blood pressure     Hypertension     Trigeminal neuralgia of left side of face          Pain:  2/10 LUE/axilla/chest       Subjective: \"some tightness but improving.\"      Objective:  1/16/2024  AROM of shoulders: (ROM on left limited by cording)    Flex: R 160, L 155   Abd: R 160, L 155  Cording L axilla down to forearm  Decreased scar mobility L breast  1/2/2024  AROM of shoulders: (ROM on left limited by cording)    Flex: R 160, L 150   Abd: R 160, L 150  Cording L axilla down to forearm  Decreased scar mobility L breast       12/26/2023  AROM/Strength:      Right AROM Left AROM Right Strength Left Strength   Shoulder                 Flexion 150 * pain with port placement 160 *improved nt nt        Abduction 155 *pain with port placement 155 * improved nt nt        IR WFL WFL nt nt        ER NT NT NT NT   Elbow                Flexion WFL WFL NT NT        Extension WFL WFL NT NT               Scapular Strength                Rhomboids     NT NT         Middle Trap     NT NT        Serratus Anterior     NT NT                 12/13/2023 (Evaluation):  Sensation:  decreased L axilla      AROM/Strength:      Right AROM Left AROM Right Strength Left Strength   Shoulder                 Flexion 160 140 nt nt        Abduction 160 110 nt nt        IR WFL WFL nt nt        ER NT NT NT NT   Elbow                Flexion WFL WFL NT NT        Extension WFL WFL NT NT               Scapular Strength                Rhomboids     NT NT         Middle Trap     NT NT        Serratus Anterior     NT NT                         Edema/Tissue Observations:    - swelling LUE  - swelling L breast and trunk               - trunk circumference (Level of nipples);  113.1 cm   - decreased scar mobility L axilla and breast  - cording L axilla, extends into L breast (lateral down toward inferior breast) and into L UE (down to mid forearm)   Stemmer's Sign: negative         Arm Volume Measurements:        12/5/2023     6:00 PM 10/25/2023     3:00 PM   Lymphedema Calculations   DATE MEASURED 12/5/2023 10/25/2023   LOCATION/MEASUREMENTS LUE LUE   PATIENT POSITION  supine 1 pillow supine 1 pillow   LIMB POSITION 75 deg abduction 75 deg abduction   STARTING POINT 17cm from 3rd cuticle 17cm from 3rd cuticle   RIGHT HAND VOLUME 21.7cm across palm 21.7cm across palm   LEFT HAND VOLUME 21.7cm across palm 21.7cm across palm   MEASUREMENT A 16.5 16.5   MEASUREMENT B 20.1 20.2   MEASUREMENT C 24.5 24.7   MEASUREMENT D 27.8 27.8   MEASUREMENT E 28.5 28.1   MEASUREMENT F 31 31   MEASUREMENT G 32.8 32.8   MEASUREMENT H 35.8 36   MEASUREMENT I 41.3 41.2    TOTAL VOLUME  2738.07609 2738.21553   DATE MEASURED 12/5/2023 10/25/2023   LOCATION/MEASUREMENTS RUE RUE   MEASUREMENT A 17.1 17   MEASUREMENT B 20.4 20.6   MEASUREMENT C 24.7 24.8   MEASUREMENT D 27.5 27.7   MEASUREMENT E 28.2 28.2   MEASUREMENT F 30.3 30.5   MEASUREMENT G 32 32.6   MEASUREMENT H 35.6 35.8   MEASUREMENT I 40.2 39.8    TOTAL VOLUME  2677.31159 2705.57828   % DIFFERENCE 2.38714 1.2097                           Lymphedema Life Impact Scale: Evaluation Score 12/13/2023: LLIS Score Evaluation: 29.5 % impaired. (0% is no impairment, 100% total impairment)           Today’s Treatment and Response:   Date 12/13/2023 Date: 12/19/2023 Date: 12/26/2023 Date: 1/2/2023 Date: 1/16/2024 Date: 1/22/2024   Visit # 1/12  O expires 12/31/2023     Certification From: 12/13/2023  To:3/12/2024     Visit # 2/12  HMO expires 12/31/2023    Certification From: 12/13/2023  To:3/12/2024  Visit:  #3/12  HMO expires 12/31/2023    Certification From: 12/13/2023  To:3/12/2024  Visit: #4 - CORRECTED  HMO 1/5 expires 3/30/2024    Certification From: 12/13/2023  To:3/12/2024      Visit: #5  HMO 2/5 expires 3/30/2024      Certification From: 12/13/2023  To:3/12/2024  Visit: #6/  HMO 3/5 expires 3/30/2024    Certification From: 12/13/2023  To:3/12/2024   Manual Therapy (40 minutes)     STM: L axilla/areas of cording. Several cavitations noted     Scar massage L axilla and breast      MLD: neck sequence, R axilla, A-A (Ant), L inguinal, L A-I, L axilla, L breast       Compression:  Discussed w/ patient the benefit of prophylactic compression sleeve. Order added for compression sleeve, [facility] to [facility] requesting O referral for compression sleeve and gauntlet.  \"Prophylactic use of compression sleeves compared with the control group reduced and delayed the occurrence of arm swelling in women at high risk for lymphedema in the first year after surgery for breast cancer.\"  Mat et al. Prophylactic Use of Compression Sleeves Reduces the Incidence of Arm Swelling in Women at High Risk of Breast Cancer-Related Lymphedema: A Randomized Controlled Trial. Journal of Clinical Oncology. Accepted on January 7, 2022 and published at [email or web address] on February 2, 2022: DOI [email or web address]                 Manual therapy (35 minutes)      STM: L axilla/areas of cording. Several cavitations noted again this session in axilla/upper arm  *cording visible and palpable in forearm but unable to release     Scar massage L axilla and breast      MLD: neck sequence, R axilla, A-A (Ant), L inguinal, L A-I, L axilla, L breast      Compression:  -handed pt the \"order\" and she will bring to her PCP to get referral generated.      Manual therapy (40 minutes)        STM: L axilla/areas of cording.No cavitations noted this session in axilla/upper arm  *cording visible and palpable in  forearm but unable to release     Scar massage L axilla and breast      MLD: neck sequence, R axilla, A-A (Ant), L inguinal, L A-I, L axilla, L breast      Compression:  -pt received letter stating that DME is approved.  Pt scheduled to get measured for compression on 12/28  Manual Therapy (25 min)      STM: L axilla and arm/areas of cording      MLD: neck sequence, L trunk, L axilla, L breast, LUE        Compression  - pt measured for compression garment, awaiting delivery  Manual  therapy (40 minutes)        STM: L axilla and arm/areas of cording      MLD: neck sequence, L trunk, L axilla, L breast, LUE      Compression:  -messaged vendor today to get an update on compression garments  Manual therapy (35 minutes)        STM: L axilla and arm/areas of cording      MLD: neck sequence, L trunk, L axilla, L breast, LUE      Compression:  -per "[employer] tracking # shipment is delayed.     There Ex (  minutes):  - instr to cont w/ exercises from screening         There Ex (10 minutes)  -PROM L shouder flexion and abduction  -side lying abduction and horiz abduction x 10 reps  (See pt instructions)  There Ex (5 minutes)  -Measured BUE AROM  -PROM L shoulder   There Ex (15 min)    Pulleys flex and abd x 10 each    Wall wash CW/CCW in flex and abd x 10 each      Pt asking when she can lift (wants to be able to lift her grand-dtr), discussed she can begin lifting light objects progress towards heavier objects slowly (to avoid injury).   There ex (10 minutes)  -PROM L shouder flexion and abduction  -side lying abduction and horiz abduction x 10 reps  There Ex (15 minutes)    Pulleys flex and abd x 10 each    Wall wash CW/CCW in flex and abd x 10 each    -doorway stretch multiple levels x 3 for 10 second holds   *printout issued     Self-Care:  Management/Education: Explained Lymphedema diagnosis; informed patient of lymphedema precautions and risk reduction practices, and importance of skin care. Discussed and demonstrated  bandaging and compression options including various vendors that can be utilized                  Assessment: Pt feeling improved mobility and decrease pain overall.    Goals:   Goals (discussed and planned with patient involvement):      To be met in 12 visits:  1) Decrease swelling L breast/trunk by 2 cm to decrease risks of infection - working towards  2) Pt to be independent with self MLD, ROM exercises, and donning/doffing compression bandages/garments to facilitate swelling reduction and to be independent at self management once discharged - working towards  3) Increase shoulder AROM flex 160, abd 170 B to improve ease of dressing/bathing/and reaching overhead - working towards  4) Increase B UE strength to at least 4/5 to improve ease of lifting and performing household chores - working towards        Plan:    Frequency / Duration: Patient will be seen for 1-2 x/week or a total of 12 visits over a 90 day period. Frequency will decrease as symptoms improve.      Treatment will include: Complete Decongestive Therapy: Manual Therapy, Self-Care/Home Management Training, Therapeutic Exercise, Neuromuscular Re-education, Orthotic Management and Training           Charges: MM2,  Ex1 Total Timed Treatment: 50  min  Total Treatment Time: 50  min

## 2024-01-23 ENCOUNTER — OFFICE VISIT (OUTPATIENT)
Dept: HEMATOLOGY/ONCOLOGY | Facility: HOSPITAL | Age: 49
End: 2024-01-23
Attending: NURSE PRACTITIONER
Payer: COMMERCIAL

## 2024-01-23 VITALS
OXYGEN SATURATION: 98 % | HEIGHT: 65 IN | HEART RATE: 82 BPM | RESPIRATION RATE: 16 BRPM | SYSTOLIC BLOOD PRESSURE: 123 MMHG | TEMPERATURE: 98 F | BODY MASS INDEX: 33.67 KG/M2 | DIASTOLIC BLOOD PRESSURE: 68 MMHG | WEIGHT: 202.13 LBS

## 2024-01-23 DIAGNOSIS — Z09 CHEMOTHERAPY FOLLOW-UP EXAMINATION: ICD-10-CM

## 2024-01-23 DIAGNOSIS — C50.112 MALIGNANT NEOPLASM OF CENTRAL PORTION OF LEFT BREAST IN FEMALE, ESTROGEN RECEPTOR POSITIVE  (HCC): Primary | ICD-10-CM

## 2024-01-23 DIAGNOSIS — Z17.0 MALIGNANT NEOPLASM OF CENTRAL PORTION OF LEFT BREAST IN FEMALE, ESTROGEN RECEPTOR POSITIVE  (HCC): Primary | ICD-10-CM

## 2024-01-23 DIAGNOSIS — Z45.2 ENCOUNTER FOR CENTRAL LINE CARE: ICD-10-CM

## 2024-01-23 LAB
ALBUMIN SERPL-MCNC: 4.8 G/DL (ref 3.2–4.8)
ALBUMIN/GLOB SERPL: 1.4 {RATIO} (ref 1–2)
ALP LIVER SERPL-CCNC: 107 U/L
ALT SERPL-CCNC: 23 U/L
ANION GAP SERPL CALC-SCNC: 8 MMOL/L (ref 0–18)
AST SERPL-CCNC: 20 U/L (ref ?–34)
BASOPHILS # BLD: 0 X10(3) UL (ref 0–0.2)
BASOPHILS NFR BLD: 0 %
BILIRUB SERPL-MCNC: 0.2 MG/DL (ref 0.3–1.2)
BUN BLD-MCNC: 9 MG/DL (ref 9–23)
BUN/CREAT SERPL: 13.2 (ref 10–20)
CALCIUM BLD-MCNC: 9.9 MG/DL (ref 8.7–10.4)
CHLORIDE SERPL-SCNC: 103 MMOL/L (ref 98–112)
CO2 SERPL-SCNC: 28 MMOL/L (ref 21–32)
CREAT BLD-MCNC: 0.68 MG/DL
DEPRECATED RDW RBC AUTO: 40.2 FL (ref 35.1–46.3)
EGFRCR SERPLBLD CKD-EPI 2021: 108 ML/MIN/1.73M2 (ref 60–?)
EOSINOPHIL # BLD: 0.09 X10(3) UL (ref 0–0.7)
EOSINOPHIL NFR BLD: 1 %
ERYTHROCYTE [DISTWIDTH] IN BLOOD BY AUTOMATED COUNT: 11.9 % (ref 11–15)
GLOBULIN PLAS-MCNC: 3.4 G/DL (ref 2.8–4.4)
GLUCOSE BLD-MCNC: 102 MG/DL (ref 70–99)
HCT VFR BLD AUTO: 34.3 %
HGB BLD-MCNC: 11.2 G/DL
LYMPHOCYTES NFR BLD: 1.4 X10(3) UL (ref 1–4)
LYMPHOCYTES NFR BLD: 15 %
MCH RBC QN AUTO: 31.5 PG (ref 26–34)
MCHC RBC AUTO-ENTMCNC: 32.7 G/DL (ref 31–37)
MCV RBC AUTO: 96.6 FL
METAMYELOCYTES # BLD: 0.47 X10(3) UL
METAMYELOCYTES NFR BLD: 5 %
MONOCYTES # BLD: 0.74 X10(3) UL (ref 0.1–1)
MONOCYTES NFR BLD: 8 %
MORPHOLOGY: NORMAL
MYELOCYTES # BLD: 0.09 X10(3) UL
MYELOCYTES NFR BLD: 1 %
NEUTROPHILS # BLD AUTO: 5.36 X10 (3) UL (ref 1.5–7.7)
NEUTROPHILS NFR BLD: 68 %
NEUTS BAND NFR BLD: 2 %
NEUTS HYPERSEG # BLD: 6.51 X10(3) UL (ref 1.5–7.7)
OSMOLALITY SERPL CALC.SUM OF ELEC: 287 MOSM/KG (ref 275–295)
PLATELET # BLD AUTO: 281 10(3)UL (ref 150–450)
PLATELET MORPHOLOGY: NORMAL
POTASSIUM SERPL-SCNC: 4.4 MMOL/L (ref 3.5–5.1)
PROT SERPL-MCNC: 8.2 G/DL (ref 5.7–8.2)
RBC # BLD AUTO: 3.55 X10(6)UL
SODIUM SERPL-SCNC: 139 MMOL/L (ref 136–145)
TOTAL CELLS COUNTED BLD: 100
WBC # BLD AUTO: 9.3 X10(3) UL (ref 4–11)

## 2024-01-23 PROCEDURE — 80053 COMPREHEN METABOLIC PANEL: CPT

## 2024-01-23 PROCEDURE — 36415 COLL VENOUS BLD VENIPUNCTURE: CPT

## 2024-01-23 PROCEDURE — 85007 BL SMEAR W/DIFF WBC COUNT: CPT

## 2024-01-23 PROCEDURE — 85027 COMPLETE CBC AUTOMATED: CPT

## 2024-01-23 PROCEDURE — 85025 COMPLETE CBC W/AUTO DIFF WBC: CPT

## 2024-01-23 PROCEDURE — 99215 OFFICE O/P EST HI 40 MIN: CPT | Performed by: NURSE PRACTITIONER

## 2024-01-23 RX ORDER — DOXORUBICIN HYDROCHLORIDE 2 MG/ML
50 INJECTION, SOLUTION INTRAVENOUS ONCE
Status: CANCELLED | OUTPATIENT
Start: 2024-01-24

## 2024-01-23 NOTE — PROGRESS NOTES
HPI   Alisia Berg is a 48 year old female here at the request of NORBERTO SERNA MD for evaluation of   Encounter Diagnoses   Name Primary?    Malignant neoplasm of central portion of left breast in female, estrogen receptor positive  (HCC) Yes    Chemotherapy follow-up examination        Patient right here status post left-sided lumpectomy with axillary lymph node dissection on 11/6/2023.     Feels arm feels stretching when lifting.      S/p cycle 2 DD AC tolerated better.  Did c/o sore throat on Day 7, Biotene helped. No fevers.     After chemo hands, darkened nails -red and feels swollen and burning x 5 days. Better today.     After started antibiotics L lower jaw became swollen. Pt had a dental cleaning prior to chemo and said she would need a follow up.  Currently improved.     LMP 10/23/23, states had 2 periods, 2 weeks apart.   Prior to that was in August.    ECOG PS  0    Review of Systems:   Review of Systems   Constitutional:  Negative for fever.   HENT:   Positive for sore throat (used Biotene).    Respiratory:  Negative for cough.    Gastrointestinal:  Positive for nausea (x 5 days). Negative for constipation and diarrhea.        Hemorhoid pain - miralax , no bleeding    Genitourinary:  Negative for frequency.    Musculoskeletal:  Negative for back pain.   Neurological:  Positive for headaches and light-headedness (at times).   Psychiatric/Behavioral:  Negative for sleep disturbance (less 5 hrs).            Current Outpatient Medications   Medication Sig Dispense Refill    prochlorperazine (COMPAZINE) 10 mg tablet Take 1 tablet (10 mg total) by mouth every 8 (eight) hours as needed for Nausea. 30 tablet 3    ondansetron (ZOFRAN) 8 MG tablet Take 1 tablet (8 mg total) by mouth every 8 (eight) hours as needed for Nausea. 30 tablet 3    carBAMazepine 200 MG Oral Tab Take 1 tablet (200 mg total) by mouth 2 (two) times daily.      losartan 50 MG Oral Tab Take 1 tablet (50 mg total) by mouth  daily.       Allergies:   No Known Allergies    Past Medical History:   Diagnosis Date    High blood pressure     Hypertension     Trigeminal neuralgia of left side of face      Past Surgical History:   Procedure Laterality Date    LUMPECTOMY LEFT  2023    NEEDLE BIOPSY RIGHT Right     bx done at age 20yrs old          x2     Social History     Socioeconomic History    Marital status:    Tobacco Use    Smoking status: Never    Smokeless tobacco: Never   Substance and Sexual Activity    Alcohol use: Never    Drug use: Never       Family History   Problem Relation Age of Onset    Breast Cancer Mother 34    Cancer Paternal Grandfather         unknown type    Ovarian Cancer Neg          PHYSICAL EXAM:    /68 (BP Location: Right arm, Patient Position: Sitting, Cuff Size: large)   Pulse 82   Temp 98.1 °F (36.7 °C) (Oral)   Resp 16   Ht 1.651 m (5' 5\")   Wt 91.7 kg (202 lb 1.6 oz)   LMP 12/15/2023 (Approximate)   SpO2 98%   BMI 33.63 kg/m²   Wt Readings from Last 6 Encounters:   24 93 kg (205 lb)   24 94.3 kg (208 lb)   23 94.9 kg (209 lb 4.8 oz)   11/15/23 97.1 kg (214 lb)   23 97.1 kg (214 lb)   11/15/23 97.1 kg (214 lb)     Physical Exam  General: Patient is alert, not in acute distress.  HEENT: EOMs intact. PERRL. Oropharynx is clear. L lower gum line no inflammation  Neck: No JVD. No palpable lymphadenopathy. Neck is supple.  Chest: Clear to auscultation.  Heart: Regular rate and rhythm.   Abdomen: Soft, non tender with good bowel sounds.  Extremities: No edema.  Neurological: Grossly intact.   Lymphatics: There is no palpable lymphadenopathy throughout in the cervical, supraclavicular, axillary, or inguinal regions.  Psych/Depression: nl          ASSESSMENT/PLAN:     1. Malignant neoplasm of central portion of left breast in female, estrogen receptor positive  (HCC)    2. Chemotherapy follow-up examination        1. Malignant neoplasm of central portion of left  breast in female, estrogen receptor positive    Cancer Staging   Malignant neoplasm of central portion of left breast in female, estrogen receptor positive  (HCC)  Staging form: Breast, AJCC 8th Edition  - Clinical stage from 9/29/2023: Stage IB (cT1c, cN1(f), cM0, G2, ER+, NE+, HER2-) - Signed by Montrell Heaton MD on 9/29/2023  - Pathologic stage from 11/15/2023: Stage IB (pT1c, pN2a, cM0, G2, ER+, NE+, HER2-) - Signed by Montrell Heaton MD on 11/15/2023    -Genetic testing was negative.    -Patient underwent a left-sided lumpectomy with sentinel lymph node which was positive, and completion axillary dissection on 11/6/2023.  She had 4 of 25 positive lymph nodes.    -Discussed with patient standard of care is to proceed with adjuvant chemotherapy within 4 to 8 weeks postoperatively.  Given her extensive beatriz involvement, recommend to proceed with adjuvant chemotherapy with dose dense Adriamycin Cytoxan followed by weekly Taxol.  Patient will have baseline cardiac function assessment with either a TTE or MUGA.  Also recommend port placement for safe administration of treatment. Briefly discussed potential SE of treatment, will discuss further on teaching session.      S/p cycle 2 DD AC, tolerated well. Ist cycle complicated by neutropenic fevers requiring ER visit, treated with Levofloxacin       Proceed with cycle 3 DD AC with dose reduction d/t neutropenia and Grade 1 PPE            - After patient completes chemotherapy, she will proceed with radiation treatment and will certainly benefit from adjuvant endocrine therapy, based on her hormone status.  Baseline hormone levels obtained, and patient appears to be in the postmenopausal range.  We will repeat this again in 1 year after last period.  Duration of adjuvant endocrine therapy will be for at least 5 years, however this could be up to 10 years.  Determination on duration of adjuvant endocrine therapy pending on some more emerging data regarding which  patient population benefits from extended endocrine therapy.  Given his beatriz status, discussed with patient that would favor up to 10 years.    No follow-ups on file.    No orders of the defined types were placed in this encounter.    MDM high risk.  Results From Past 48 Hours:  Recent Results (from the past 48 hour(s))   Comp Metabolic Panel (14)    Collection Time: 01/23/24 12:23 PM   Result Value Ref Range    Glucose 102 (H) 70 - 99 mg/dL    Sodium 139 136 - 145 mmol/L    Potassium 4.4 3.5 - 5.1 mmol/L    Chloride 103 98 - 112 mmol/L    CO2 28.0 21.0 - 32.0 mmol/L    Anion Gap 8 0 - 18 mmol/L    BUN 9 9 - 23 mg/dL    Creatinine 0.68 0.55 - 1.02 mg/dL    BUN/CREA Ratio 13.2 10.0 - 20.0    Calcium, Total 9.9 8.7 - 10.4 mg/dL    Calculated Osmolality 287 275 - 295 mOsm/kg    eGFR-Cr 108 >=60 mL/min/1.73m2    ALT 23 10 - 49 U/L    AST 20 <=34 U/L    Alkaline Phosphatase 107 (H) 39 - 100 U/L    Bilirubin, Total 0.2 (L) 0.3 - 1.2 mg/dL    Total Protein 8.2 5.7 - 8.2 g/dL    Albumin 4.8 3.2 - 4.8 g/dL    Globulin  3.4 2.8 - 4.4 g/dL    A/G Ratio 1.4 1.0 - 2.0    Patient Fasting for CMP? Patient not present    CBC W/ DIFFERENTIAL    Collection Time: 01/23/24 12:23 PM   Result Value Ref Range    WBC 9.3 4.0 - 11.0 x10(3) uL    RBC 3.55 (L) 3.80 - 5.30 x10(6)uL    HGB 11.2 (L) 12.0 - 16.0 g/dL    HCT 34.3 (L) 35.0 - 48.0 %    MCV 96.6 80.0 - 100.0 fL    MCH 31.5 26.0 - 34.0 pg    MCHC 32.7 31.0 - 37.0 g/dL    RDW-SD 40.2 35.1 - 46.3 fL    RDW 11.9 11.0 - 15.0 %    .0 150.0 - 450.0 10(3)uL    Neutrophil Absolute Prelim 5.36 1.50 - 7.70 x10 (3) uL         Imaging & Referrals:  None

## 2024-01-24 ENCOUNTER — OFFICE VISIT (OUTPATIENT)
Dept: HEMATOLOGY/ONCOLOGY | Facility: HOSPITAL | Age: 49
End: 2024-01-24
Attending: NURSE PRACTITIONER
Payer: COMMERCIAL

## 2024-01-24 VITALS
HEART RATE: 87 BPM | TEMPERATURE: 99 F | BODY MASS INDEX: 34 KG/M2 | OXYGEN SATURATION: 97 % | DIASTOLIC BLOOD PRESSURE: 71 MMHG | SYSTOLIC BLOOD PRESSURE: 113 MMHG | WEIGHT: 204.38 LBS | RESPIRATION RATE: 16 BRPM

## 2024-01-24 DIAGNOSIS — Z17.0 MALIGNANT NEOPLASM OF CENTRAL PORTION OF LEFT BREAST IN FEMALE, ESTROGEN RECEPTOR POSITIVE  (HCC): Primary | ICD-10-CM

## 2024-01-24 DIAGNOSIS — C50.112 MALIGNANT NEOPLASM OF CENTRAL PORTION OF LEFT BREAST IN FEMALE, ESTROGEN RECEPTOR POSITIVE  (HCC): Primary | ICD-10-CM

## 2024-01-24 DIAGNOSIS — Z45.2 ENCOUNTER FOR CENTRAL LINE CARE: ICD-10-CM

## 2024-01-24 PROCEDURE — 96413 CHEMO IV INFUSION 1 HR: CPT

## 2024-01-24 PROCEDURE — 96375 TX/PRO/DX INJ NEW DRUG ADDON: CPT

## 2024-01-24 PROCEDURE — 96367 TX/PROPH/DG ADDL SEQ IV INF: CPT

## 2024-01-24 PROCEDURE — 96411 CHEMO IV PUSH ADDL DRUG: CPT

## 2024-01-24 PROCEDURE — 96372 THER/PROPH/DIAG INJ SC/IM: CPT

## 2024-01-24 RX ORDER — DOXORUBICIN HYDROCHLORIDE 2 MG/ML
50 INJECTION, SOLUTION INTRAVENOUS ONCE
Status: COMPLETED | OUTPATIENT
Start: 2024-01-24 | End: 2024-01-24

## 2024-01-24 RX ORDER — HEPARIN SODIUM (PORCINE) LOCK FLUSH IV SOLN 100 UNIT/ML 100 UNIT/ML
5 SOLUTION INTRAVENOUS ONCE
Status: COMPLETED | OUTPATIENT
Start: 2024-01-24 | End: 2024-01-24

## 2024-01-24 RX ORDER — SODIUM CHLORIDE 9 MG/ML
10 INJECTION, SOLUTION INTRAMUSCULAR; INTRAVENOUS; SUBCUTANEOUS ONCE
OUTPATIENT
Start: 2024-01-24

## 2024-01-24 RX ORDER — HEPARIN SODIUM (PORCINE) LOCK FLUSH IV SOLN 100 UNIT/ML 100 UNIT/ML
5 SOLUTION INTRAVENOUS ONCE
OUTPATIENT
Start: 2024-01-24

## 2024-01-24 RX ORDER — HEPARIN SODIUM (PORCINE) LOCK FLUSH IV SOLN 100 UNIT/ML 100 UNIT/ML
SOLUTION INTRAVENOUS
Status: COMPLETED
Start: 2024-01-24 | End: 2024-01-24

## 2024-01-24 RX ADMIN — DOXORUBICIN HYDROCHLORIDE 104 MG: 2 INJECTION, SOLUTION INTRAVENOUS at 09:52:00

## 2024-01-24 RX ADMIN — HEPARIN SODIUM (PORCINE) LOCK FLUSH IV SOLN 100 UNIT/ML 500 UNITS: 100 SOLUTION INTRAVENOUS at 10:50:00

## 2024-01-24 NOTE — PROGRESS NOTES
Pt here for C3 D1 Drug(s)AC/Fulphila.  Arrives Ambulating independently, accompanied by Self     Patient was evaluated today by Treatment Nurse.    Oral medications included in this regimen:  no    Patient confirms comprehension of cancer treatment schedule:  yes    Pregnancy screening:  Denies possibility of pregnancy    Modifications in dose or schedule:  No    Medications appearance and physical integrity checked by RN: yes.    Chemotherapy IV pump settings verified by 2 RNs:  Yes.  Frequency of blood return and site check throughout administration: Prior to administration, Prior to each drug, and At completion of therapy     Infusion/treatment outcome:  patient tolerated treatment without incident    Education Record    Learner:  Patient  Barriers / Limitations:  None  Method:  Brief focused and Discussion  Education / instructions given:  Plan of care for treatment today  Outcome:  Shows understanding    Discharged Home, Ambulating independently, accompanied by:Self    Patient/family verbalized understanding of future appointments: by MyChart messaging

## 2024-02-02 ENCOUNTER — OFFICE VISIT (OUTPATIENT)
Dept: PHYSICAL THERAPY | Facility: HOSPITAL | Age: 49
End: 2024-02-02
Attending: SURGERY
Payer: COMMERCIAL

## 2024-02-02 PROCEDURE — 97110 THERAPEUTIC EXERCISES: CPT | Performed by: PHYSICAL THERAPIST

## 2024-02-02 PROCEDURE — 97140 MANUAL THERAPY 1/> REGIONS: CPT | Performed by: PHYSICAL THERAPIST

## 2024-02-02 NOTE — PROGRESS NOTES
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)  Decreased ROM of left shoulder (M25.612)        Date of onset: 11/6/2023 Evaluation Date: 12/13/2023     Physical Therapy Lymphedema Daily Note    Precautions:  will be starting chemo and will need radiation   Education or treatment limitation: chemo  Rehab Potential:good    Past Medical History:   Diagnosis Date    High blood pressure     Hypertension     Trigeminal neuralgia of left side of face          Pain: currently no pain     Subjective: \"no pain but every once in a while my breast is sore\"       Objective:    2/2/2024:  AROM of shoulders: (ROM on left limited by cording)    Flex: R 160, L 160   Abd: R 170, L 165  Strength B shld: 4+/5  Cording L axilla do elbow, mild (not painful unless manually stretched in end range Abd)   Very mild swelling L breast  - trunk circumference (Level of nipples);  111.1 cm       1/16/2024  AROM of shoulders: (ROM on left limited by cording)    Flex: R 160, L 155   Abd: R 160, L 155  Cording L axilla down to forearm  Decreased scar mobility L breast  1/2/2024  AROM of shoulders: (ROM on left limited by cording)    Flex: R 160, L 150   Abd: R 160, L 150  Cording L axilla down to forearm  Decreased scar mobility L breast       12/26/2023  AROM/Strength:      Right AROM Left AROM Right Strength Left Strength   Shoulder                 Flexion 150 * pain with port placement 160 *improved nt nt        Abduction 155 *pain with port placement 155 * improved nt nt        IR WFL WFL nt nt        ER NT NT NT NT   Elbow                Flexion WFL WFL NT NT        Extension WFL WFL NT NT               Scapular Strength                Rhomboids     NT NT         Middle Trap     NT NT        Serratus Anterior     NT NT                 12/13/2023 (Evaluation):  Sensation:  decreased L axilla      AROM/Strength:      Right AROM Left AROM Right Strength Left Strength    Shoulder                 Flexion 160 140 nt nt        Abduction 160 110 nt nt        IR WFL WFL nt nt        ER NT NT NT NT   Elbow                Flexion WFL WFL NT NT        Extension WFL WFL NT NT               Scapular Strength                Rhomboids     NT NT         Middle Trap     NT NT        Serratus Anterior     NT NT                        Edema/Tissue Observations:    - swelling LUE  - swelling L breast and trunk               - trunk circumference (Level of nipples);  113.1 cm   - decreased scar mobility L axilla and breast  - cording L axilla, extends into L breast (lateral down toward inferior breast) and into L UE (down to mid forearm)   Stemmer's Sign: negative         Arm Volume Measurements:        12/5/2023     6:00 PM 10/25/2023     3:00 PM   Lymphedema Calculations   DATE MEASURED 12/5/2023 10/25/2023   LOCATION/MEASUREMENTS LUE LUE   PATIENT POSITION  supine 1 pillow supine 1 pillow   LIMB POSITION 75 deg abduction 75 deg abduction   STARTING POINT 17cm from 3rd cuticle 17cm from 3rd cuticle   RIGHT HAND VOLUME 21.7cm across palm 21.7cm across palm   LEFT HAND VOLUME 21.7cm across palm 21.7cm across palm   MEASUREMENT A 16.5 16.5   MEASUREMENT B 20.1 20.2   MEASUREMENT C 24.5 24.7   MEASUREMENT D 27.8 27.8   MEASUREMENT E 28.5 28.1   MEASUREMENT F 31 31   MEASUREMENT G 32.8 32.8   MEASUREMENT H 35.8 36   MEASUREMENT I 41.3 41.2    TOTAL VOLUME  [id number] 2738.97766   DATE MEASURED 12/5/2023 10/25/2023   LOCATION/MEASUREMENTS RUE RUE   MEASUREMENT A 17.1 17   MEASUREMENT B 20.4 20.6   MEASUREMENT C 24.7 24.8   MEASUREMENT D 27.5 27.7   MEASUREMENT E 28.2 28.2   MEASUREMENT F 30.3 30.5   MEASUREMENT G 32 32.6   MEASUREMENT H 35.6 35.8   MEASUREMENT I 40.2 39.8    TOTAL VOLUME  2677.07795 2705.15300   % DIFFERENCE 2.31373 1.2097                           Lymphedema Life Impact Scale: Evaluation Score 12/13/2023: LLIS Score Evaluation: 29.5 % impaired. (0% is no impairment, 100% total  impairment)           Today’s Treatment and Response:   Date 12/13/2023 Date: 12/19/2023 Date: 12/26/2023 Date: 1/2/2023 Date: 1/16/2024 Date: 1/22/2024 2/2/2024   Visit # 1/12  HMO expires 12/31/2023     Certification From: 12/13/2023  To:3/12/2024     Visit # 2/12  HMO expires 12/31/2023    Certification From: 12/13/2023  To:3/12/2024  Visit: #3/12  HMO expires 12/31/2023    Certification From: 12/13/2023  To:3/12/2024  Visit: #4 - CORRECTED  HMO 1/5 expires 3/30/2024    Certification From: 12/13/2023  To:3/12/2024      Visit: #5  HMO 2/5 expires 3/30/2024      Certification From: 12/13/2023  To:3/12/2024  Visit: #6/  HMO 3/5 expires 3/30/2024    Certification From: 12/13/2023  To:3/12/2024 Visit: #7  HMO 4/5 expires 3/30/2024    Certification From: 12/13/2023  To:3/12/2024   Manual Therapy (40 minutes)     STM: L axilla/areas of cording. Several cavitations noted     Scar massage L axilla and breast      MLD: neck sequence, R axilla, A-A (Ant), L inguinal, L A-I, L axilla, L breast       Compression:  Discussed w/ patient the benefit of prophylactic compression sleeve. Order added for compression sleeve, [facility] requesting [facility] referral for compression sleeve and gauntlet.  \"Prophylactic use of compression sleeves compared with the control group reduced and delayed the occurrence of arm swelling in women at high risk for lymphedema in the first year after surgery for breast cancer.\"  Mat et al. Prophylactic Use of Compression Sleeves Reduces the Incidence of Arm Swelling in Women at High Risk of Breast Cancer-Related Lymphedema: A Randomized Controlled Trial. Journal of Clinical Oncology. Accepted on January 7, 2022 and published at [email or web address] on February 2, 2022: DOI [email or web address]                 Manual therapy (35 minutes)      STM: L axilla/areas of cording. Several cavitations noted again this session in axilla/upper arm  *cording  visible and palpable in forearm but unable to release     Scar massage L axilla and breast      MLD: neck sequence, R axilla, A-A (Ant), L inguinal, L A-I, L axilla, L breast      Compression:  -handed pt the \"order\" and she will bring to her PCP to get referral generated.      Manual therapy (40 minutes)        STM: L axilla/areas of cording.No cavitations noted this session in axilla/upper arm  *cording visible and palpable in forearm but unable to release     Scar massage L axilla and breast      MLD: neck sequence, R axilla, A-A (Ant), L inguinal, L A-I, L axilla, L breast      Compression:  -pt received letter stating that DME is approved.  Pt scheduled to get measured for compression on 12/28  Manual Therapy (25 min)      STM: L axilla and arm/areas of cording      MLD: neck sequence, L trunk, L axilla, L breast, LUE        Compression  - pt measured for compression garment, awaiting delivery  Manual  therapy (40 minutes)        STM: L axilla and arm/areas of cording      MLD: neck sequence, L trunk, L axilla, L breast, LUE      Compression:  -messaged vendor today to get an update on compression garments  Manual therapy (35 minutes)        STM: L axilla and arm/areas of cording      MLD: neck sequence, L trunk, L axilla, L breast, LUE      Compression:  -per [employer] tracking # shipment is delayed.   Manual Therapy (30 min)    STM L axilla and area of cording    STM  L breast and pect w/ lotion. Encouraged pt to do the same           Compression:  - pt wearing new compression sleeve and gauntlet (custom [employer]): overall good fit, but instr to wash/wear and we will re-assess    There Ex (  minutes):  - instr to cont w/ exercises from screening         There Ex (10 minutes)  -PROM L shouder flexion and abduction  -side lying abduction and horiz abduction x 10 reps  (See pt instructions)  There Ex (5 minutes)  -Measured BUE AROM  -PROM L shoulder   There Ex (15 min)    Pulleys flex and abd x 10 each    Wall wash  CW/CCW in flex and abd x 10 each      Pt asking when she can lift (wants to be able to lift her grand-dtr), discussed she can begin lifting light objects progress towards heavier objects slowly (to avoid injury).   There ex (10 minutes)  -PROM L shouder flexion and abduction  -side lying abduction and horiz abduction x 10 reps  There Ex (15 minutes)    Pulleys flex and abd x 10 each    Wall wash CW/CCW in flex and abd x 10 each    -doorway stretch multiple levels x 3 for 10 second holds   *printout issued   There Ex (10 min)    Doorway str    Sidely horiz abd/add  x10    Sidely windmills x 10    Self-Care:  Management/Education: Explained Lymphedema diagnosis; informed patient of lymphedema precautions and risk reduction practices, and importance of skin care. Discussed and demonstrated bandaging and compression options including various vendors that can be utilized                   Assessment: Improved ROM and less swelling. Cording persists, but very mild. Pt to attempt self management, will re-assess in a couple of weeks      Goals:   Goals (discussed and planned with patient involvement):      To be met in 12 visits:  1) Decrease swelling L breast/trunk by 2 cm to decrease risks of infection - MET  2) Pt to be independent with self MLD, ROM exercises, and donning/doffing compression bandages/garments to facilitate swelling reduction and to be independent at self management once discharged - working towards  3) Increase shoulder AROM flex 160, abd 170 B to improve ease of dressing/bathing/and reaching overhead - almost met  4) Increase B UE strength to at least 4/5 to improve ease of lifting and performing household chores - working towards        Plan:   Pt to attempt self management- will re-assess in few weeks        Treatment will include: Complete Decongestive Therapy: Manual Therapy, Self-Care/Home Management Training, Therapeutic Exercise, Neuromuscular Re-education, Orthotic Management and Training            Charges: MM2,  Ex1 Total Timed Treatment: 40  min  Total Treatment Time: 40  min

## 2024-02-07 ENCOUNTER — OFFICE VISIT (OUTPATIENT)
Dept: HEMATOLOGY/ONCOLOGY | Facility: HOSPITAL | Age: 49
End: 2024-02-07
Attending: NURSE PRACTITIONER
Payer: COMMERCIAL

## 2024-02-07 ENCOUNTER — OFFICE VISIT (OUTPATIENT)
Dept: HEMATOLOGY/ONCOLOGY | Facility: HOSPITAL | Age: 49
End: 2024-02-07
Attending: INTERNAL MEDICINE
Payer: COMMERCIAL

## 2024-02-07 VITALS
HEART RATE: 73 BPM | OXYGEN SATURATION: 98 % | TEMPERATURE: 98 F | SYSTOLIC BLOOD PRESSURE: 126 MMHG | RESPIRATION RATE: 16 BRPM | DIASTOLIC BLOOD PRESSURE: 67 MMHG | BODY MASS INDEX: 33.99 KG/M2 | WEIGHT: 204 LBS | HEIGHT: 65 IN

## 2024-02-07 DIAGNOSIS — C50.112 MALIGNANT NEOPLASM OF CENTRAL PORTION OF LEFT BREAST IN FEMALE, ESTROGEN RECEPTOR POSITIVE  (HCC): Primary | ICD-10-CM

## 2024-02-07 DIAGNOSIS — Z09 CHEMOTHERAPY FOLLOW-UP EXAMINATION: ICD-10-CM

## 2024-02-07 DIAGNOSIS — Z17.0 MALIGNANT NEOPLASM OF CENTRAL PORTION OF LEFT BREAST IN FEMALE, ESTROGEN RECEPTOR POSITIVE  (HCC): Primary | ICD-10-CM

## 2024-02-07 DIAGNOSIS — Z45.2 ENCOUNTER FOR CENTRAL LINE CARE: ICD-10-CM

## 2024-02-07 LAB
ALBUMIN SERPL-MCNC: 4.3 G/DL (ref 3.2–4.8)
ALBUMIN/GLOB SERPL: 1.3 {RATIO} (ref 1–2)
ALP LIVER SERPL-CCNC: 90 U/L
ALT SERPL-CCNC: 25 U/L
ANION GAP SERPL CALC-SCNC: 7 MMOL/L (ref 0–18)
AST SERPL-CCNC: 22 U/L (ref ?–34)
BASOPHILS # BLD: 0.07 X10(3) UL (ref 0–0.2)
BASOPHILS NFR BLD: 1 %
BILIRUB SERPL-MCNC: 0.2 MG/DL (ref 0.3–1.2)
BUN BLD-MCNC: 11 MG/DL (ref 9–23)
BUN/CREAT SERPL: 16.9 (ref 10–20)
CALCIUM BLD-MCNC: 9.5 MG/DL (ref 8.7–10.4)
CHLORIDE SERPL-SCNC: 106 MMOL/L (ref 98–112)
CO2 SERPL-SCNC: 26 MMOL/L (ref 21–32)
CREAT BLD-MCNC: 0.65 MG/DL
DEPRECATED RDW RBC AUTO: 43.5 FL (ref 35.1–46.3)
EGFRCR SERPLBLD CKD-EPI 2021: 109 ML/MIN/1.73M2 (ref 60–?)
EOSINOPHIL # BLD: 0.07 X10(3) UL (ref 0–0.7)
EOSINOPHIL NFR BLD: 1 %
ERYTHROCYTE [DISTWIDTH] IN BLOOD BY AUTOMATED COUNT: 14.1 % (ref 11–15)
GLOBULIN PLAS-MCNC: 3.2 G/DL (ref 2.8–4.4)
GLUCOSE BLD-MCNC: 90 MG/DL (ref 70–99)
HCT VFR BLD AUTO: 28.8 %
HGB BLD-MCNC: 10 G/DL
LYMPHOCYTES NFR BLD: 1.59 X10(3) UL (ref 1–4)
LYMPHOCYTES NFR BLD: 23 %
MCH RBC QN AUTO: 33.4 PG (ref 26–34)
MCHC RBC AUTO-ENTMCNC: 34.7 G/DL (ref 31–37)
MCV RBC AUTO: 96.3 FL
MONOCYTES # BLD: 0.41 X10(3) UL (ref 0.1–1)
MONOCYTES NFR BLD: 6 %
MORPHOLOGY: NORMAL
NEUTROPHILS # BLD AUTO: 4.12 X10 (3) UL (ref 1.5–7.7)
NEUTROPHILS NFR BLD: 66 %
NEUTS BAND NFR BLD: 3 %
NEUTS HYPERSEG # BLD: 4.76 X10(3) UL (ref 1.5–7.7)
NRBC BLD MANUAL-RTO: 1 %
OSMOLALITY SERPL CALC.SUM OF ELEC: 287 MOSM/KG (ref 275–295)
PLATELET # BLD AUTO: 289 10(3)UL (ref 150–450)
PLATELET MORPHOLOGY: NORMAL
POTASSIUM SERPL-SCNC: 4.1 MMOL/L (ref 3.5–5.1)
PROT SERPL-MCNC: 7.5 G/DL (ref 5.7–8.2)
RBC # BLD AUTO: 2.99 X10(6)UL
SODIUM SERPL-SCNC: 139 MMOL/L (ref 136–145)
TOTAL CELLS COUNTED BLD: 100
WBC # BLD AUTO: 6.9 X10(3) UL (ref 4–11)

## 2024-02-07 PROCEDURE — 99215 OFFICE O/P EST HI 40 MIN: CPT | Performed by: NURSE PRACTITIONER

## 2024-02-07 PROCEDURE — 96372 THER/PROPH/DIAG INJ SC/IM: CPT

## 2024-02-07 PROCEDURE — 96367 TX/PROPH/DG ADDL SEQ IV INF: CPT

## 2024-02-07 PROCEDURE — 96413 CHEMO IV INFUSION 1 HR: CPT

## 2024-02-07 PROCEDURE — 96411 CHEMO IV PUSH ADDL DRUG: CPT

## 2024-02-07 PROCEDURE — 96375 TX/PRO/DX INJ NEW DRUG ADDON: CPT

## 2024-02-07 PROCEDURE — 85007 BL SMEAR W/DIFF WBC COUNT: CPT

## 2024-02-07 PROCEDURE — 80053 COMPREHEN METABOLIC PANEL: CPT

## 2024-02-07 PROCEDURE — 85027 COMPLETE CBC AUTOMATED: CPT

## 2024-02-07 PROCEDURE — 85025 COMPLETE CBC W/AUTO DIFF WBC: CPT

## 2024-02-07 RX ORDER — DOXORUBICIN HYDROCHLORIDE 2 MG/ML
50 INJECTION, SOLUTION INTRAVENOUS ONCE
Status: COMPLETED | OUTPATIENT
Start: 2024-02-07 | End: 2024-02-07

## 2024-02-07 RX ORDER — DOXORUBICIN HYDROCHLORIDE 2 MG/ML
50 INJECTION, SOLUTION INTRAVENOUS ONCE
Status: CANCELLED | OUTPATIENT
Start: 2024-02-07

## 2024-02-07 RX ORDER — HEPARIN SODIUM (PORCINE) LOCK FLUSH IV SOLN 100 UNIT/ML 100 UNIT/ML
SOLUTION INTRAVENOUS
Status: DISPENSED
Start: 2024-02-07 | End: 2024-02-08

## 2024-02-07 RX ADMIN — DOXORUBICIN HYDROCHLORIDE 104 MG: 2 INJECTION, SOLUTION INTRAVENOUS at 12:32:00

## 2024-02-07 NOTE — PROGRESS NOTES
HPI   Alisia Berg is a 48 year old female here at the request of NORBERTO SERNA MD for evaluation of   Encounter Diagnoses   Name Primary?    Malignant neoplasm of central portion of left breast in female, estrogen receptor positive  (HCC) Yes    Chemotherapy follow-up examination        Patient right here status post left-sided lumpectomy with axillary lymph node dissection on 11/6/2023.     Feels arm feels stretching when lifting.      S/p cycle 3 DD AC tolerated better.  Did c/o sore throat on Day 7, Biotene helped. No fevers.     GERD better with omeprazole.    After chemo hands, darkened nails -red and feels swollen, feet same. Better today.      LMP 10/23/23, states had 2 periods, 2 weeks apart.   Prior to that was in August.    ECOG PS  1- mild fatigue    Review of Systems:   Review of Systems   Constitutional:  Negative for fever.   HENT:   Positive for sore throat (used Biotene).    Respiratory:  Negative for cough (dry cough).    Gastrointestinal:  Positive for nausea (x 5 days). Negative for constipation and diarrhea.        GERD   Genitourinary:  Negative for frequency.    Musculoskeletal:  Negative for back pain.   Skin:         Darkening of hands and skin dry by cuticle   Neurological:  Positive for headaches and light-headedness (at times).   Psychiatric/Behavioral:  Negative for sleep disturbance (less 5 hrs).            Current Outpatient Medications   Medication Sig Dispense Refill    prochlorperazine (COMPAZINE) 10 mg tablet Take 1 tablet (10 mg total) by mouth every 8 (eight) hours as needed for Nausea. 30 tablet 3    ondansetron (ZOFRAN) 8 MG tablet Take 1 tablet (8 mg total) by mouth every 8 (eight) hours as needed for Nausea. 30 tablet 3    carBAMazepine 200 MG Oral Tab Take 1 tablet (200 mg total) by mouth 2 (two) times daily.      losartan 50 MG Oral Tab Take 1 tablet (50 mg total) by mouth daily.       Allergies:   No Known Allergies    Past Medical History:   Diagnosis Date     High blood pressure     Hypertension     Trigeminal neuralgia of left side of face      Past Surgical History:   Procedure Laterality Date    LUMPECTOMY LEFT  2023    NEEDLE BIOPSY RIGHT Right     bx done at age 20yrs old          x2     Social History     Socioeconomic History    Marital status:    Tobacco Use    Smoking status: Never    Smokeless tobacco: Never   Substance and Sexual Activity    Alcohol use: Never    Drug use: Never       Family History   Problem Relation Age of Onset    Breast Cancer Mother 34    Cancer Paternal Grandfather         unknown type    Ovarian Cancer Neg          PHYSICAL EXAM:    /67 (BP Location: Right arm, Patient Position: Sitting, Cuff Size: large)   Pulse 73   Temp 97.7 °F (36.5 °C) (Oral)   Resp 16   Ht 1.651 m (5' 5\")   Wt 92.5 kg (204 lb)   LMP 12/15/2023 (Approximate)   SpO2 98%   BMI 33.95 kg/m²   Wt Readings from Last 6 Encounters:   24 92.7 kg (204 lb 6.4 oz)   24 91.7 kg (202 lb 1.6 oz)   24 93 kg (205 lb)   24 94.3 kg (208 lb)   23 94.9 kg (209 lb 4.8 oz)   11/15/23 97.1 kg (214 lb)     Physical Exam  General: Patient is alert, not in acute distress.  HEENT: EOMs intact. PERRL. Oropharynx is clear.   Neck: No JVD. No palpable lymphadenopathy. Neck is supple.  Chest: Clear to auscultation.  Heart: Regular rate and rhythm.   Abdomen: Soft, non tender with good bowel sounds.  Extremities: No edema.  Neurological: Grossly intact.   Lymphatics: There is no palpable lymphadenopathy throughout in the cervical, supraclavicular, axillary, or inguinal regions.  Psych/Depression: nl          ASSESSMENT/PLAN:     1. Malignant neoplasm of central portion of left breast in female, estrogen receptor positive  (HCC)    2. Chemotherapy follow-up examination        1. Malignant neoplasm of central portion of left breast in female, estrogen receptor positive    Cancer Staging   Malignant neoplasm of central portion of left  breast in female, estrogen receptor positive  (HCC)  Staging form: Breast, AJCC 8th Edition  - Clinical stage from 9/29/2023: Stage IB (cT1c, cN1(f), cM0, G2, ER+, WV+, HER2-) - Signed by Montrell Heaton MD on 9/29/2023  - Pathologic stage from 11/15/2023: Stage IB (pT1c, pN2a, cM0, G2, ER+, WV+, HER2-) - Signed by Montrell Heaton MD on 11/15/2023    -Genetic testing was negative.    -Patient underwent a left-sided lumpectomy with sentinel lymph node which was positive, and completion axillary dissection on 11/6/2023.  She had 4 of 25 positive lymph nodes.    -Discussed with patient standard of care is to proceed with adjuvant chemotherapy within 4 to 8 weeks postoperatively.  Given her extensive beatriz involvement, recommend to proceed with adjuvant chemotherapy with dose dense Adriamycin Cytoxan followed by weekly Taxol.  Patient will have baseline cardiac function assessment with either a TTE or MUGA.  Also recommend port placement for safe administration of treatment. Briefly discussed potential SE of treatment, will discuss further on teaching session.      S/p cycle 3 DD AC, tolerated well. Ist cycle complicated by neutropenic fevers requiring ER visit, treated with Levofloxacin       Proceed with cycle 4 DD AC with dose reduction d/t neutropenia and Grade 1 PPE    GERD -add omeprazole daily         - After patient completes chemotherapy, she will proceed with radiation treatment and will certainly benefit from adjuvant endocrine therapy, based on her hormone status.  Baseline hormone levels obtained, and patient appears to be in the postmenopausal range.  We will repeat this again in 1 year after last period.  Duration of adjuvant endocrine therapy will be for at least 5 years, however this could be up to 10 years.  Determination on duration of adjuvant endocrine therapy pending on some more emerging data regarding which patient population benefits from extended endocrine therapy.  Given his beatriz status,  discussed with patient that would favor up to 10 years.    No follow-ups on file.    No orders of the defined types were placed in this encounter.    MDM high risk.  Results From Past 48 Hours:  Recent Results (from the past 48 hour(s))   Comp Metabolic Panel (14)    Collection Time: 02/07/24  9:31 AM   Result Value Ref Range    Glucose 90 70 - 99 mg/dL    Sodium 139 136 - 145 mmol/L    Potassium 4.1 3.5 - 5.1 mmol/L    Chloride 106 98 - 112 mmol/L    CO2 26.0 21.0 - 32.0 mmol/L    Anion Gap 7 0 - 18 mmol/L    BUN 11 9 - 23 mg/dL    Creatinine 0.65 0.55 - 1.02 mg/dL    BUN/CREA Ratio 16.9 10.0 - 20.0    Calcium, Total 9.5 8.7 - 10.4 mg/dL    Calculated Osmolality 287 275 - 295 mOsm/kg    eGFR-Cr 109 >=60 mL/min/1.73m2    ALT 25 10 - 49 U/L    AST 22 <=34 U/L    Alkaline Phosphatase 90 39 - 100 U/L    Bilirubin, Total 0.2 (L) 0.3 - 1.2 mg/dL    Total Protein 7.5 5.7 - 8.2 g/dL    Albumin 4.3 3.2 - 4.8 g/dL    Globulin  3.2 2.8 - 4.4 g/dL    A/G Ratio 1.3 1.0 - 2.0    Patient Fasting for CMP? Patient not present    CBC W/ DIFFERENTIAL    Collection Time: 02/07/24  9:31 AM   Result Value Ref Range    WBC 6.9 4.0 - 11.0 x10(3) uL    RBC 2.99 (L) 3.80 - 5.30 x10(6)uL    HGB 10.0 (L) 12.0 - 16.0 g/dL    HCT 28.8 (L) 35.0 - 48.0 %    MCV 96.3 80.0 - 100.0 fL    MCH 33.4 26.0 - 34.0 pg    MCHC 34.7 31.0 - 37.0 g/dL    RDW-SD 43.5 35.1 - 46.3 fL    RDW 14.1 11.0 - 15.0 %    .0 150.0 - 450.0 10(3)uL    Neutrophil Absolute Prelim 4.12 1.50 - 7.70 x10 (3) uL   Manual differential    Collection Time: 02/07/24  9:31 AM   Result Value Ref Range    Neutrophil Absolute Manual 4.76 1.50 - 7.70 x10(3) uL    Lymphocyte Absolute Manual 1.59 1.00 - 4.00 x10(3) uL    Monocyte Absolute Manual 0.41 0.10 - 1.00 x10(3) uL    Eosinophil Absolute Manual 0.07 0.00 - 0.70 x10(3) uL    Basophil Absolute Manual 0.07 0.00 - 0.20 x10(3) uL    Neutrophils % Manual 66 %    Band % 3 %    Lymphocyte % Manual 23 %    Monocyte % Manual 6 %     Eosinophil % Manual 1 %    Basophil % Manual 1 %    NRBC 1 (H) 0    Total Cells Counted 100     RBC Morphology Normal Normal, Slide reviewed, see previous RBC morphology.    Platelet Morphology Normal Normal         Imaging & Referrals:  None

## 2024-02-15 ENCOUNTER — SOCIAL WORK SERVICES (OUTPATIENT)
Dept: HEMATOLOGY/ONCOLOGY | Facility: HOSPITAL | Age: 49
End: 2024-02-15

## 2024-02-21 ENCOUNTER — OFFICE VISIT (OUTPATIENT)
Dept: HEMATOLOGY/ONCOLOGY | Facility: HOSPITAL | Age: 49
End: 2024-02-21
Attending: INTERNAL MEDICINE
Payer: COMMERCIAL

## 2024-02-21 ENCOUNTER — NURSE ONLY (OUTPATIENT)
Dept: HEMATOLOGY/ONCOLOGY | Facility: HOSPITAL | Age: 49
End: 2024-02-21
Attending: NURSE PRACTITIONER
Payer: COMMERCIAL

## 2024-02-21 VITALS
WEIGHT: 202 LBS | HEIGHT: 65 IN | BODY MASS INDEX: 33.66 KG/M2 | TEMPERATURE: 98 F | DIASTOLIC BLOOD PRESSURE: 69 MMHG | HEART RATE: 75 BPM | RESPIRATION RATE: 16 BRPM | OXYGEN SATURATION: 97 % | SYSTOLIC BLOOD PRESSURE: 137 MMHG

## 2024-02-21 DIAGNOSIS — Z45.2 ENCOUNTER FOR CENTRAL LINE CARE: ICD-10-CM

## 2024-02-21 DIAGNOSIS — C50.112 MALIGNANT NEOPLASM OF CENTRAL PORTION OF LEFT BREAST IN FEMALE, ESTROGEN RECEPTOR POSITIVE (HCC): Primary | ICD-10-CM

## 2024-02-21 DIAGNOSIS — Z09 CHEMOTHERAPY FOLLOW-UP EXAMINATION: ICD-10-CM

## 2024-02-21 DIAGNOSIS — Z17.0 MALIGNANT NEOPLASM OF CENTRAL PORTION OF LEFT BREAST IN FEMALE, ESTROGEN RECEPTOR POSITIVE (HCC): Primary | ICD-10-CM

## 2024-02-21 LAB
ALBUMIN SERPL-MCNC: 4.4 G/DL (ref 3.2–4.8)
ALBUMIN/GLOB SERPL: 1.4 {RATIO} (ref 1–2)
ALP LIVER SERPL-CCNC: 107 U/L
ALT SERPL-CCNC: 36 U/L
ANION GAP SERPL CALC-SCNC: 7 MMOL/L (ref 0–18)
AST SERPL-CCNC: 24 U/L (ref ?–34)
BASOPHILS # BLD: 0 X10(3) UL (ref 0–0.2)
BASOPHILS NFR BLD: 0 %
BILIRUB SERPL-MCNC: 0.3 MG/DL (ref 0.3–1.2)
BUN BLD-MCNC: 6 MG/DL (ref 9–23)
BUN/CREAT SERPL: 9.4 (ref 10–20)
CALCIUM BLD-MCNC: 9.5 MG/DL (ref 8.7–10.4)
CHLORIDE SERPL-SCNC: 105 MMOL/L (ref 98–112)
CO2 SERPL-SCNC: 29 MMOL/L (ref 21–32)
CREAT BLD-MCNC: 0.64 MG/DL
DEPRECATED RDW RBC AUTO: 53 FL (ref 35.1–46.3)
EGFRCR SERPLBLD CKD-EPI 2021: 110 ML/MIN/1.73M2 (ref 60–?)
EOSINOPHIL # BLD: 0 X10(3) UL (ref 0–0.7)
EOSINOPHIL NFR BLD: 0 %
ERYTHROCYTE [DISTWIDTH] IN BLOOD BY AUTOMATED COUNT: 16 % (ref 11–15)
GLOBULIN PLAS-MCNC: 3.1 G/DL (ref 2.8–4.4)
GLUCOSE BLD-MCNC: 100 MG/DL (ref 70–99)
HCT VFR BLD AUTO: 27.9 %
HGB BLD-MCNC: 9.4 G/DL
LYMPHOCYTES NFR BLD: 0.72 X10(3) UL (ref 1–4)
LYMPHOCYTES NFR BLD: 13 %
MCH RBC QN AUTO: 32.9 PG (ref 26–34)
MCHC RBC AUTO-ENTMCNC: 33.7 G/DL (ref 31–37)
MCV RBC AUTO: 97.6 FL
METAMYELOCYTES # BLD: 0.11 X10(3) UL
METAMYELOCYTES NFR BLD: 2 %
MONOCYTES # BLD: 0.61 X10(3) UL (ref 0.1–1)
MONOCYTES NFR BLD: 11 %
MORPHOLOGY: NORMAL
MYELOCYTES # BLD: 0.22 X10(3) UL
MYELOCYTES NFR BLD: 4 %
NEUTROPHILS # BLD AUTO: 3.04 X10 (3) UL (ref 1.5–7.7)
NEUTROPHILS NFR BLD: 66 %
NEUTS BAND NFR BLD: 4 %
NEUTS HYPERSEG # BLD: 3.85 X10(3) UL (ref 1.5–7.7)
NRBC BLD MANUAL-RTO: 1 %
OSMOLALITY SERPL CALC.SUM OF ELEC: 290 MOSM/KG (ref 275–295)
PLATELET # BLD AUTO: 206 10(3)UL (ref 150–450)
PLATELET MORPHOLOGY: NORMAL
POTASSIUM SERPL-SCNC: 4.2 MMOL/L (ref 3.5–5.1)
PROT SERPL-MCNC: 7.5 G/DL (ref 5.7–8.2)
RBC # BLD AUTO: 2.86 X10(6)UL
SODIUM SERPL-SCNC: 141 MMOL/L (ref 136–145)
TOTAL CELLS COUNTED BLD: 100
WBC # BLD AUTO: 5.5 X10(3) UL (ref 4–11)

## 2024-02-21 PROCEDURE — 96413 CHEMO IV INFUSION 1 HR: CPT

## 2024-02-21 PROCEDURE — 80053 COMPREHEN METABOLIC PANEL: CPT

## 2024-02-21 PROCEDURE — S0028 INJECTION, FAMOTIDINE, 20 MG: HCPCS

## 2024-02-21 PROCEDURE — 96375 TX/PRO/DX INJ NEW DRUG ADDON: CPT

## 2024-02-21 PROCEDURE — 85007 BL SMEAR W/DIFF WBC COUNT: CPT

## 2024-02-21 PROCEDURE — 85027 COMPLETE CBC AUTOMATED: CPT

## 2024-02-21 PROCEDURE — 85025 COMPLETE CBC W/AUTO DIFF WBC: CPT

## 2024-02-21 PROCEDURE — 99215 OFFICE O/P EST HI 40 MIN: CPT | Performed by: INTERNAL MEDICINE

## 2024-02-21 RX ORDER — DIPHENHYDRAMINE HYDROCHLORIDE 50 MG/ML
25 INJECTION INTRAMUSCULAR; INTRAVENOUS ONCE
OUTPATIENT
Start: 2024-03-06

## 2024-02-21 RX ORDER — FAMOTIDINE 10 MG/ML
20 INJECTION, SOLUTION INTRAVENOUS ONCE
Status: COMPLETED | OUTPATIENT
Start: 2024-02-21 | End: 2024-02-21

## 2024-02-21 RX ORDER — FAMOTIDINE 10 MG/ML
20 INJECTION, SOLUTION INTRAVENOUS ONCE
Status: CANCELLED | OUTPATIENT
Start: 2024-02-21

## 2024-02-21 RX ORDER — HEPARIN SODIUM (PORCINE) LOCK FLUSH IV SOLN 100 UNIT/ML 100 UNIT/ML
5 SOLUTION INTRAVENOUS ONCE
OUTPATIENT
Start: 2024-02-21

## 2024-02-21 RX ORDER — SODIUM CHLORIDE 9 MG/ML
10 INJECTION, SOLUTION INTRAMUSCULAR; INTRAVENOUS; SUBCUTANEOUS ONCE
OUTPATIENT
Start: 2024-02-21

## 2024-02-21 RX ORDER — DIPHENHYDRAMINE HYDROCHLORIDE 50 MG/ML
INJECTION INTRAMUSCULAR; INTRAVENOUS
Status: COMPLETED
Start: 2024-02-21 | End: 2024-02-21

## 2024-02-21 RX ORDER — FAMOTIDINE 10 MG/ML
20 INJECTION, SOLUTION INTRAVENOUS ONCE
OUTPATIENT
Start: 2024-02-28

## 2024-02-21 RX ORDER — HEPARIN SODIUM (PORCINE) LOCK FLUSH IV SOLN 100 UNIT/ML 100 UNIT/ML
5 SOLUTION INTRAVENOUS ONCE
Status: COMPLETED | OUTPATIENT
Start: 2024-02-21 | End: 2024-02-21

## 2024-02-21 RX ORDER — DIPHENHYDRAMINE HYDROCHLORIDE 50 MG/ML
25 INJECTION INTRAMUSCULAR; INTRAVENOUS ONCE
Status: CANCELLED | OUTPATIENT
Start: 2024-02-21

## 2024-02-21 RX ORDER — HEPARIN SODIUM (PORCINE) LOCK FLUSH IV SOLN 100 UNIT/ML 100 UNIT/ML
SOLUTION INTRAVENOUS
Status: COMPLETED
Start: 2024-02-21 | End: 2024-02-21

## 2024-02-21 RX ORDER — FAMOTIDINE 10 MG/ML
INJECTION, SOLUTION INTRAVENOUS
Status: COMPLETED
Start: 2024-02-21 | End: 2024-02-21

## 2024-02-21 RX ORDER — DIPHENHYDRAMINE HYDROCHLORIDE 50 MG/ML
25 INJECTION INTRAMUSCULAR; INTRAVENOUS ONCE
OUTPATIENT
Start: 2024-02-28

## 2024-02-21 RX ORDER — FAMOTIDINE 10 MG/ML
20 INJECTION, SOLUTION INTRAVENOUS ONCE
OUTPATIENT
Start: 2024-03-06

## 2024-02-21 RX ORDER — DIPHENHYDRAMINE HYDROCHLORIDE 50 MG/ML
25 INJECTION INTRAMUSCULAR; INTRAVENOUS ONCE
Status: COMPLETED | OUTPATIENT
Start: 2024-02-21 | End: 2024-02-21

## 2024-02-21 RX ADMIN — DIPHENHYDRAMINE HYDROCHLORIDE 25 MG: 50 INJECTION INTRAMUSCULAR; INTRAVENOUS at 10:34:00

## 2024-02-21 RX ADMIN — HEPARIN SODIUM (PORCINE) LOCK FLUSH IV SOLN 100 UNIT/ML 500 UNITS: 100 SOLUTION INTRAVENOUS at 12:36:00

## 2024-02-21 RX ADMIN — FAMOTIDINE 20 MG: 10 INJECTION, SOLUTION INTRAVENOUS at 10:32:00

## 2024-02-21 NOTE — PROGRESS NOTES
HPI   Alisia Berg is a 48 year old female here at the request of NORBERTO SERNA MD for evaluation of   Encounter Diagnoses   Name Primary?    Malignant neoplasm of central portion of left breast in female, estrogen receptor positive (HCC) Yes    Chemotherapy follow-up examination        Patient right here status post left-sided lumpectomy with axillary lymph node dissection on 11/6/2023.     Feels arm feels stretching when lifting.      Patient is s/p cycle 4 of DD AC.    Fatigue:  Yes    Fevers:  Yes, states 100.1F but not higher.  States for 2 days.  Took tylenol.  Had a little sore throat but states resolved.      Appetite/taste changes:  Yes, states OK now.    Mucositis:  Yes, improved with rinsing.    Weight changes:  No    Nausea/vomiting:  Yes, nausea all the time and vomiting 2-3 times    Diarrhea:  No    Constipation:  No    Peripheral neuropathy:  Yes, finger tips.    Skin:  Some PPE at hands and feet and hyperpigmentation at finger nails.    Has hemorrhoids and states that using Preparation H pads.  States having some pain from these. Not bleeding from hemorrhoids.      LMP January 2024    ECOG PS  1- mild fatigue    Review of Systems:   Review of Systems   Respiratory:  Negative for cough and shortness of breath.    Cardiovascular:  Negative for chest pain.   Gastrointestinal:  Negative for abdominal pain.   Endocrine: Positive for hot flashes.   Genitourinary:  Negative for dysuria and frequency.    Musculoskeletal:  Negative for arthralgias and back pain.   Neurological:  Positive for headaches (state mild). Negative for dizziness.   Hematological:  Negative for adenopathy. Does not bruise/bleed easily.   Psychiatric/Behavioral:  Negative for sleep disturbance.            Current Outpatient Medications   Medication Sig Dispense Refill    prochlorperazine (COMPAZINE) 10 mg tablet Take 1 tablet (10 mg total) by mouth every 8 (eight) hours as needed for Nausea. 30 tablet 3    ondansetron  (ZOFRAN) 8 MG tablet Take 1 tablet (8 mg total) by mouth every 8 (eight) hours as needed for Nausea. 30 tablet 3    carBAMazepine 200 MG Oral Tab Take 1 tablet (200 mg total) by mouth 2 (two) times daily.      losartan 50 MG Oral Tab Take 1 tablet (50 mg total) by mouth daily.       Allergies:   No Known Allergies    Past Medical History:   Diagnosis Date    High blood pressure     Hypertension     Trigeminal neuralgia of left side of face      Past Surgical History:   Procedure Laterality Date    LUMPECTOMY LEFT  2023    NEEDLE BIOPSY RIGHT Right     bx done at age 20yrs old          x2     Social History     Socioeconomic History    Marital status:    Tobacco Use    Smoking status: Never    Smokeless tobacco: Never   Substance and Sexual Activity    Alcohol use: Never    Drug use: Never       Family History   Problem Relation Age of Onset    Breast Cancer Mother 34    Cancer Paternal Grandfather         unknown type    Ovarian Cancer Neg          PHYSICAL EXAM:    /69 (BP Location: Right arm, Patient Position: Sitting, Cuff Size: large)   Pulse 75   Temp 98.4 °F (36.9 °C) (Oral)   Resp 16   Ht 1.651 m (5' 5\")   Wt 91.6 kg (202 lb)   LMP 12/15/2023 (Approximate)   SpO2 97%   BMI 33.61 kg/m²   Wt Readings from Last 6 Encounters:   24 91.6 kg (202 lb)   24 92.5 kg (204 lb)   24 92.7 kg (204 lb 6.4 oz)   24 91.7 kg (202 lb 1.6 oz)   24 93 kg (205 lb)   24 94.3 kg (208 lb)     Physical Exam  General: Patient is alert, not in acute distress.  HEENT: EOMs intact. PERRL. Oropharynx is clear.   Neck: No JVD. No palpable lymphadenopathy. Neck is supple.  Chest: Clear to auscultation.  Heart: Regular rate and rhythm.   Abdomen: Soft, non tender with good bowel sounds.  Extremities: No edema.  Neurological: Grossly intact.   Lymphatics: There is no palpable lymphadenopathy throughout in the cervical, supraclavicular, axillary, or inguinal  regions.  Psych/Depression: nl  Skin:  hyperpigmentation of the finger nails and dorsum of hands.  Alopecia.        ASSESSMENT/PLAN:     1. Malignant neoplasm of central portion of left breast in female, estrogen receptor positive (HCC)    2. Chemotherapy follow-up examination        1. Malignant neoplasm of central portion of left breast in female, estrogen receptor positive    Cancer Staging   Malignant neoplasm of central portion of left breast in female, estrogen receptor positive (HCC)  Staging form: Breast, AJCC 8th Edition  - Clinical stage from 9/29/2023: Stage IB (cT1c, cN1(f), cM0, G2, ER+, ME+, HER2-) - Signed by Montrell Heaton MD on 9/29/2023  - Pathologic stage from 11/15/2023: Stage IB (pT1c, pN2a, cM0, G2, ER+, ME+, HER2-) - Signed by Montrell Heaton MD on 11/15/2023    -Genetic testing was negative.    -Patient underwent a left-sided lumpectomy with sentinel lymph node which was positive, and completion axillary dissection on 11/6/2023.  She had 4 of 25 positive lymph nodes.    -Discussed with patient standard of care is to proceed with adjuvant chemotherapy within 4 to 8 weeks postoperatively.  Given her extensive beatriz involvement, recommend to proceed with adjuvant chemotherapy with dose dense Adriamycin Cytoxan followed by weekly Taxol.  Patient will have baseline cardiac function assessment with either a TTE or MUGA.  Also recommend port placement for safe administration of treatment. Briefly discussed potential SE of treatment, will discuss further on teaching session.      S/p cycle 4 DD AC, tolerated well. 1st cycle complicated by neutropenic fevers requiring ER visit, treated with Levofloxacin       Proceed with cycle 1 of weekly taxol.    - After patient completes chemotherapy, she will proceed with radiation treatment.    - Will certainly benefit from adjuvant endocrine therapy, based on her hormone status.  Baseline hormone levels obtained, and patient appears to be in the postmenopausal  range.  We will repeat this again in 1 year after last period.  Duration of adjuvant endocrine therapy will be for at least 5 years, however this could be up to 10 years.  Determination on duration of adjuvant endocrine therapy pending on some more emerging data regarding which patient population benefits from extended endocrine therapy.  Given his beatriz status, discussed with patient that would favor up to 10 years.    Return in about 3 weeks (around 3/13/2024) for chemotherapy follow-up, with labs.    No orders of the defined types were placed in this encounter.    MDM high risk.  Results From Past 48 Hours:  Recent Results (from the past 48 hour(s))   Comp Metabolic Panel (14)    Collection Time: 02/21/24  8:20 AM   Result Value Ref Range    Glucose 100 (H) 70 - 99 mg/dL    Sodium 141 136 - 145 mmol/L    Potassium 4.2 3.5 - 5.1 mmol/L    Chloride 105 98 - 112 mmol/L    CO2 29.0 21.0 - 32.0 mmol/L    Anion Gap 7 0 - 18 mmol/L    BUN 6 (L) 9 - 23 mg/dL    Creatinine 0.64 0.55 - 1.02 mg/dL    BUN/CREA Ratio 9.4 (L) 10.0 - 20.0    Calcium, Total 9.5 8.7 - 10.4 mg/dL    Calculated Osmolality 290 275 - 295 mOsm/kg    eGFR-Cr 110 >=60 mL/min/1.73m2    ALT 36 10 - 49 U/L    AST 24 <=34 U/L    Alkaline Phosphatase 107 (H) 39 - 100 U/L    Bilirubin, Total 0.3 0.3 - 1.2 mg/dL    Total Protein 7.5 5.7 - 8.2 g/dL    Albumin 4.4 3.2 - 4.8 g/dL    Globulin  3.1 2.8 - 4.4 g/dL    A/G Ratio 1.4 1.0 - 2.0    Patient Fasting for CMP? Patient not present    CBC W/ DIFFERENTIAL    Collection Time: 02/21/24  8:20 AM   Result Value Ref Range    WBC 5.5 4.0 - 11.0 x10(3) uL    RBC 2.86 (L) 3.80 - 5.30 x10(6)uL    HGB 9.4 (L) 12.0 - 16.0 g/dL    HCT 27.9 (L) 35.0 - 48.0 %    MCV 97.6 80.0 - 100.0 fL    MCH 32.9 26.0 - 34.0 pg    MCHC 33.7 31.0 - 37.0 g/dL    RDW-SD 53.0 (H) 35.1 - 46.3 fL    RDW 16.0 (H) 11.0 - 15.0 %    .0 150.0 - 450.0 10(3)uL    Neutrophil Absolute Prelim 3.04 1.50 - 7.70 x10 (3) uL   Manual differential     Collection Time: 02/21/24  8:20 AM   Result Value Ref Range    Neutrophil Absolute Manual 3.85 1.50 - 7.70 x10(3) uL    Lymphocyte Absolute Manual 0.72 (L) 1.00 - 4.00 x10(3) uL    Monocyte Absolute Manual 0.61 0.10 - 1.00 x10(3) uL    Eosinophil Absolute Manual 0.00 0.00 - 0.70 x10(3) uL    Basophil Absolute Manual 0.00 0.00 - 0.20 x10(3) uL    Metamyelocyte Absolute Manual 0.11 (H) 0 x10(3) uL    Myelocyte Absolute Manual 0.22 (H) 0 x10(3) uL    Neutrophils % Manual 66 %    Band % 4 %    Lymphocyte % Manual 13 %    Monocyte % Manual 11 %    Eosinophil % Manual 0 %    Basophil % Manual 0 %    Metamyelocyte % 2 %    Myelocyte % 4 %    NRBC 1 (H) 0    Total Cells Counted 100     RBC Morphology Normal Normal, Slide reviewed, see previous RBC morphology.    Platelet Morphology Normal Normal         Imaging & Referrals:  None

## 2024-02-21 NOTE — PROGRESS NOTES
Pt here for C5 Taxol; first infusion of.  Infusion started at 1/2 rate.    Patient was evaluated today by MD. Reyes     Oral medications included in this regimen:  no    Patient confirms comprehension of cancer treatment schedule:  yes    Pregnancy screening:  Denies possibility of pregnancy    Modifications in dose or schedule:  No    Medications appearance and physical integrity checked by RN: yes.    Chemotherapy IV pump settings verified by 2 RNs:  Yes.  Frequency of blood return and site check throughout administration: Prior to administration and At completion of therapy     Infusion/treatment outcome:  patient tolerated treatment without incident    Education Record    Learner:  Patient  Barriers / Limitations:  None  Method:  Demonstration  Education / instructions given:  reviewed potential adverse reaction to and s/s of. Premedications.  Outcome:  Shows understanding    Discharged Other stable from infusion , Ambulating independently, accompanied by:Self    Patient/family verbalized understanding of future appointments: by MyChart messaging

## 2024-02-22 ENCOUNTER — OFFICE VISIT (OUTPATIENT)
Dept: PHYSICAL THERAPY | Facility: HOSPITAL | Age: 49
End: 2024-02-22
Attending: SURGERY
Payer: COMMERCIAL

## 2024-02-22 DIAGNOSIS — L76.82 AXILLARY WEB SYNDROME: ICD-10-CM

## 2024-02-22 DIAGNOSIS — L90.5 AXILLARY WEB SYNDROME: ICD-10-CM

## 2024-02-22 DIAGNOSIS — C50.919 BREAST CANCER (HCC): Primary | ICD-10-CM

## 2024-02-22 PROCEDURE — 97140 MANUAL THERAPY 1/> REGIONS: CPT | Performed by: PHYSICAL THERAPIST

## 2024-02-22 PROCEDURE — 97110 THERAPEUTIC EXERCISES: CPT | Performed by: PHYSICAL THERAPIST

## 2024-02-22 NOTE — PROGRESS NOTES
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       Assessment: Pt w/ improved ROM and strength, decreased swelling. Pt has been able to self manage w/ HEP and compression garments. Pt's garments are now too large (pt w/ weight loss and decreased arm swelling), pt will have her garments re-made by [employer]     Plan: Discharge from PT       Lymphedema Life Impact Scale: Score 2/22/2024: LLIS Score Current: 5 % impaired. (0% is no impairment, 100% total impairment)        Patient/Family/Caregiver was advised of these findings, precautions, and treatment options and has agreed to actively participate in planning and for this course of care.    Physical Therapy Lymphedema Daily Note    Precautions:  will be starting chemo and will need radiation   Education or treatment limitation: chemo  Rehab Potential:good    Past Medical History:   Diagnosis Date    High blood pressure     Hypertension     Trigeminal neuralgia of left side of face          Pain: currently no pain     Subjective: \"\"I feel tight\" Pt reporting tightness L axilla      Objective:    2/22/2024:  AROM of shoulders:    Flex: R 160, L 160   Abd: R 170, L 170  Strength B shld: 4+/5  No palpable cording  No palpable or visual swelling LUE  Slight swelling L breast (pt's bra too large, discussed getting new bra for slight compression  Decreased flexibility L latissimus      2/22/2024     8:00 AM 12/5/2023     6:00 PM 10/25/2023     3:00 PM   Lymphedema Calculations   DATE MEASURED 2/22/2024 12/5/2023 10/25/2023   LOCATION/MEASUREMENTS LUE LUE LUE   PATIENT POSITION  supine 1 pillow supine 1 pillow supine 1 pillow   LIMB POSITION 75 deg abduction 75 deg abduction 75 deg abduction   STARTING POINT 17cm from 3rd  cuticle 17cm from 3rd cuticle 17cm from 3rd cuticle   RIGHT HAND VOLUME 21.7cm across palm 21.7cm across palm 21.7cm across palm   LEFT HAND VOLUME 21.7cm across palm 21.7cm across palm 21.7cm across palm   MEASUREMENT A 16.5 16.5 16.5   MEASUREMENT B 19.7 20.1 20.2   MEASUREMENT C 23.8 24.5 24.7   MEASUREMENT D 26.3 27.8 27.8   MEASUREMENT E 28.1 28.5 28.1   MEASUREMENT F 30.1 31 31   MEASUREMENT G 31.9 32.8 32.8   MEASUREMENT H 34.3 35.8 36   MEASUREMENT I 38.3 41.3 41.2    TOTAL VOLUME  2544.76043 2738.45521 2738.93255   DATE MEASURED 2/22/2024 12/5/2023 10/25/2023   LOCATION/MEASUREMENTS RUE RUE RUE   MEASUREMENT A 17.1 17.1 17   MEASUREMENT B 20.4 20.4 20.6   MEASUREMENT C 24.5 24.7 24.8   MEASUREMENT D 27.3 27.5 27.7   MEASUREMENT E 28 28.2 28.2   MEASUREMENT F 29.1 30.3 30.5   MEASUREMENT G 30.4 32 32.6   MEASUREMENT H 34.6 35.6 35.8   MEASUREMENT I 38.4 40.2 39.8    TOTAL VOLUME  2540.82991 2677.71131 2705.79502   % DIFFERENCE 0.59013 2.87486 1.2097            2/2/2024:  AROM of shoulders: (ROM on left limited by cording)    Flex: R 160, L 160   Abd: R 170, L 165  Strength B shld: 4+/5  Cording L axilla do elbow, mild (not painful unless manually stretched in end range Abd)   Very mild swelling L breast  - trunk circumference (Level of nipples);  111.1 cm       1/16/2024  AROM of shoulders: (ROM on left limited by cording)    Flex: R 160, L 155   Abd: R 160, L 155  Cording L axilla down to forearm  Decreased scar mobility L breast  1/2/2024  AROM of shoulders: (ROM on left limited by cording)    Flex: R 160, L 150   Abd: R 160, L 150  Cording L axilla down to forearm  Decreased scar mobility L breast       12/26/2023  AROM/Strength:      Right AROM Left AROM Right Strength Left Strength   Shoulder                 Flexion 150 * pain with port placement 160 *improved nt nt        Abduction 155 *pain with port placement 155 * improved nt nt        IR WFL WFL nt nt        ER NT NT NT NT   Elbow                 Flexion WFL WFL NT NT        Extension WFL WFL NT NT               Scapular Strength                Rhomboids     NT NT         Middle Trap     NT NT        Serratus Anterior     NT NT                 12/13/2023 (Evaluation):  Sensation:  decreased L axilla      AROM/Strength:      Right AROM Left AROM Right Strength Left Strength   Shoulder                 Flexion 160 140 nt nt        Abduction 160 110 nt nt        IR WFL WFL nt nt        ER NT NT NT NT   Elbow                Flexion WFL WFL NT NT        Extension WFL WFL NT NT               Scapular Strength                Rhomboids     NT NT         Middle Trap     NT NT        Serratus Anterior     NT NT                        Edema/Tissue Observations:    - swelling LUE  - swelling L breast and trunk               - trunk circumference (Level of nipples);  113.1 cm   - decreased scar mobility L axilla and breast  - cording L axilla, extends into L breast (lateral down toward inferior breast) and into L UE (down to mid forearm)   Stemmer's Sign: negative         Arm Volume Measurements:        12/5/2023     6:00 PM 10/25/2023     3:00 PM   Lymphedema Calculations   DATE MEASURED 12/5/2023 10/25/2023   LOCATION/MEASUREMENTS LUE LUE   PATIENT POSITION  supine 1 pillow supine 1 pillow   LIMB POSITION 75 deg abduction 75 deg abduction   STARTING POINT 17cm from 3rd cuticle 17cm from 3rd cuticle   RIGHT HAND VOLUME 21.7cm across palm 21.7cm across palm   LEFT HAND VOLUME 21.7cm across palm 21.7cm across palm   MEASUREMENT A 16.5 16.5   MEASUREMENT B 20.1 20.2   MEASUREMENT C 24.5 24.7   MEASUREMENT D 27.8 27.8   MEASUREMENT E 28.5 28.1   MEASUREMENT F 31 31   MEASUREMENT G 32.8 32.8   MEASUREMENT H 35.8 36   MEASUREMENT I 41.3 41.2    TOTAL VOLUME  2738.47832 2738.03375   DATE MEASURED 12/5/2023 10/25/2023   LOCATION/MEASUREMENTS RUE RUE   MEASUREMENT A 17.1 17   MEASUREMENT B 20.4 20.6   MEASUREMENT C 24.7 24.8   MEASUREMENT D 27.5 27.7   MEASUREMENT E 28.2 28.2    MEASUREMENT F 30.3 30.5   MEASUREMENT G 32 32.6   MEASUREMENT H 35.6 35.8   MEASUREMENT I 40.2 39.8    TOTAL VOLUME  2677.88740 2705.32422   % DIFFERENCE 2.85709 1.2097                           Lymphedema Life Impact Scale: Evaluation Score 12/13/2023: LLIS Score Evaluation: 29.5 % impaired. (0% is no impairment, 100% total impairment)           Today’s Treatment and Response:   Date 12/13/2023 Date: 12/19/2023 Date: 12/26/2023 Date: 1/2/2023 Date: 1/16/2024 Date: 1/22/2024 2/2/2024 2/22/2024   Visit # 1/12  HMO expires 12/31/2023     Certification From: 12/13/2023  To:3/12/2024     Visit # 2/12  HMO expires 12/31/2023    Certification From: 12/13/2023  To:3/12/2024  Visit: #3/12  HMO expires 12/31/2023    Certification From: 12/13/2023  To:3/12/2024  Visit: #4 - CORRECTED  HMO 1/5 expires 3/30/2024    Certification From: 12/13/2023  To:3/12/2024      Visit: #5  HMO 2/5 expires 3/30/2024      Certification From: 12/13/2023  To:3/12/2024  Visit: #6/  HMO 3/5 expires 3/30/2024    Certification From: 12/13/2023  To:3/12/2024 Visit: #7  HMO 4/5 expires 3/30/2024    Certification From: 12/13/2023  To:3/12/2024  Visit: #8  HMO 5/5 expires 3/30/2024    Certification From: 12/13/2023  To:3/12/2024   Manual Therapy (40 minutes)     STM: L axilla/areas of cording. Several cavitations noted     Scar massage L axilla and breast      MLD: neck sequence, R axilla, A-A (Ant), L inguinal, L A-I, L axilla, L breast       Compression:  Discussed w/ patient the benefit of prophylactic compression sleeve. Order added for compression sleeve, [facility] requesting [facility] referral for compression sleeve and gauntlet.  \"Prophylactic use of compression sleeves compared with the control group reduced and delayed the occurrence of arm swelling in women at high risk for lymphedema in the first year after surgery for breast cancer.\"  Mat et al. Prophylactic Use of Compression Sleeves Reduces the Incidence  of Arm Swelling in Women at High Risk of Breast Cancer-Related Lymphedema: A Randomized Controlled Trial. Journal of Clinical Oncology. Accepted on January 7, 2022 and published at [email or web address] on February 2, 2022: DOI https://[email or web address]/[email or web address]                 Manual therapy (35 minutes)      STM: L axilla/areas of cording. Several cavitations noted again this session in axilla/upper arm  *cording visible and palpable in forearm but unable to release     Scar massage L axilla and breast      MLD: neck sequence, R axilla, A-A (Ant), L inguinal, L A-I, L axilla, L breast      Compression:  -handed pt the \"order\" and she will bring to her PCP to get referral generated.      Manual therapy (40 minutes)        STM: L axilla/areas of cording.No cavitations noted this session in axilla/upper arm  *cording visible and palpable in forearm but unable to release     Scar massage L axilla and breast      MLD: neck sequence, R axilla, A-A (Ant), L inguinal, L A-I, L axilla, L breast      Compression:  -pt received letter stating that DME is approved.  Pt scheduled to get measured for compression on 12/28  Manual Therapy (25 min)      STM: L axilla and arm/areas of cording      MLD: neck sequence, L trunk, L axilla, L breast, LUE        Compression  - pt measured for compression garment, awaiting delivery  Manual  therapy (40 minutes)        STM: L axilla and arm/areas of cording      MLD: neck sequence, L trunk, L axilla, L breast, LUE      Compression:  -messaged vendor today to get an update on compression garments  Manual therapy (35 minutes)        STM: L axilla and arm/areas of cording      MLD: neck sequence, L trunk, L axilla, L breast, LUE      Compression:  -per [employer] tracking # shipment is delayed.   Manual Therapy (30 min)    STM L axilla and area of cording    STM  L breast and pect w/ lotion. Encouraged pt to do the same           Compression:  - pt wearing new compression sleeve and gauntlet  (custom [employer]): overall good fit, but instr to wash/wear and we will re-assess  Manual Therapy (30 min)    Re-assessment w/ arm volume measurements     STM L axilla and area of cording      Compression:  - sleeve becoming too large w/ pt's arm swelling reduction and weight loss    There Ex (  minutes):  - instr to cont w/ exercises from screening         There Ex (10 minutes)  -PROM L shouder flexion and abduction  -side lying abduction and horiz abduction x 10 reps  (See pt instructions)  There Ex (5 minutes)  -Measured BUE AROM  -PROM L shoulder   There Ex (15 min)    Pulleys flex and abd x 10 each    Wall wash CW/CCW in flex and abd x 10 each      Pt asking when she can lift (wants to be able to lift her grand-dtr), discussed she can begin lifting light objects progress towards heavier objects slowly (to avoid injury).   There ex (10 minutes)  -PROM L shouder flexion and abduction  -side lying abduction and horiz abduction x 10 reps  There Ex (15 minutes)    Pulleys flex and abd x 10 each    Wall wash CW/CCW in flex and abd x 10 each    -doorway stretch multiple levels x 3 for 10 second holds   *printout issued   There Ex (10 min)    Doorway str    Sidely horiz abd/add  x10    Sidely windmills x 10  There Ex (10 min)    Lat str x 5  Prayer stretch x 3  Prayer stretch diagonal x 3   Self-Care:  Management/Education: Explained Lymphedema diagnosis; informed patient of lymphedema precautions and risk reduction practices, and importance of skin care. Discussed and demonstrated bandaging and compression options including various vendors that can be utilized                    Goals:   Goals (discussed and planned with patient involvement):      To be met in 12 visits:  1) Decrease swelling L breast/trunk by 2 cm to decrease risks of infection - MET  2) Pt to be independent with self MLD, ROM exercises, and donning/doffing compression bandages/garments to facilitate swelling reduction and to be independent at self  management once discharged - MET  3) Increase shoulder AROM flex 160, abd 170 B to improve ease of dressing/bathing/and reaching overhead - MET  4) Increase B UE strength to at least 4/5 to improve ease of lifting and performing household chores - MET        Plan: Discharge from PT          Charges: MM2,  Ex1   Total Timed Treatment: 40  min  Total Treatment Time: 40  min

## 2024-02-28 ENCOUNTER — OFFICE VISIT (OUTPATIENT)
Dept: HEMATOLOGY/ONCOLOGY | Facility: HOSPITAL | Age: 49
End: 2024-02-28
Attending: INTERNAL MEDICINE
Payer: COMMERCIAL

## 2024-02-28 ENCOUNTER — NURSE ONLY (OUTPATIENT)
Dept: HEMATOLOGY/ONCOLOGY | Facility: HOSPITAL | Age: 49
End: 2024-02-28
Attending: NURSE PRACTITIONER
Payer: COMMERCIAL

## 2024-02-28 VITALS
BODY MASS INDEX: 34.16 KG/M2 | WEIGHT: 205 LBS | DIASTOLIC BLOOD PRESSURE: 26 MMHG | RESPIRATION RATE: 18 BRPM | HEIGHT: 65 IN | TEMPERATURE: 98 F | SYSTOLIC BLOOD PRESSURE: 121 MMHG | HEART RATE: 85 BPM | OXYGEN SATURATION: 97 %

## 2024-02-28 DIAGNOSIS — Z17.0 MALIGNANT NEOPLASM OF CENTRAL PORTION OF LEFT BREAST IN FEMALE, ESTROGEN RECEPTOR POSITIVE (HCC): Primary | ICD-10-CM

## 2024-02-28 DIAGNOSIS — C50.112 MALIGNANT NEOPLASM OF CENTRAL PORTION OF LEFT BREAST IN FEMALE, ESTROGEN RECEPTOR POSITIVE (HCC): Primary | ICD-10-CM

## 2024-02-28 DIAGNOSIS — Z45.2 ENCOUNTER FOR CENTRAL LINE CARE: ICD-10-CM

## 2024-02-28 LAB
BASOPHILS # BLD AUTO: 0.06 X10(3) UL (ref 0–0.2)
BASOPHILS NFR BLD AUTO: 1.6 %
DEPRECATED RDW RBC AUTO: 58.3 FL (ref 35.1–46.3)
EOSINOPHIL # BLD AUTO: 0.03 X10(3) UL (ref 0–0.7)
EOSINOPHIL NFR BLD AUTO: 0.8 %
ERYTHROCYTE [DISTWIDTH] IN BLOOD BY AUTOMATED COUNT: 17.3 % (ref 11–15)
HCT VFR BLD AUTO: 26.9 %
HGB BLD-MCNC: 8.9 G/DL
IMM GRANULOCYTES # BLD AUTO: 0.02 X10(3) UL (ref 0–1)
IMM GRANULOCYTES NFR BLD: 0.5 %
LYMPHOCYTES # BLD AUTO: 0.64 X10(3) UL (ref 1–4)
LYMPHOCYTES NFR BLD AUTO: 16.7 %
MCH RBC QN AUTO: 32.2 PG (ref 26–34)
MCHC RBC AUTO-ENTMCNC: 33.1 G/DL (ref 31–37)
MCV RBC AUTO: 97.5 FL
MONOCYTES # BLD AUTO: 0.39 X10(3) UL (ref 0.1–1)
MONOCYTES NFR BLD AUTO: 10.2 %
NEUTROPHILS # BLD AUTO: 2.7 X10 (3) UL (ref 1.5–7.7)
NEUTROPHILS # BLD AUTO: 2.7 X10(3) UL (ref 1.5–7.7)
NEUTROPHILS NFR BLD AUTO: 70.2 %
PLATELET # BLD AUTO: 380 10(3)UL (ref 150–450)
RBC # BLD AUTO: 2.76 X10(6)UL
WBC # BLD AUTO: 3.8 X10(3) UL (ref 4–11)

## 2024-02-28 PROCEDURE — 96413 CHEMO IV INFUSION 1 HR: CPT

## 2024-02-28 PROCEDURE — S0028 INJECTION, FAMOTIDINE, 20 MG: HCPCS

## 2024-02-28 PROCEDURE — 96375 TX/PRO/DX INJ NEW DRUG ADDON: CPT

## 2024-02-28 PROCEDURE — 85025 COMPLETE CBC W/AUTO DIFF WBC: CPT

## 2024-02-28 RX ORDER — FAMOTIDINE 10 MG/ML
20 INJECTION, SOLUTION INTRAVENOUS ONCE
Status: COMPLETED | OUTPATIENT
Start: 2024-02-28 | End: 2024-02-28

## 2024-02-28 RX ORDER — HEPARIN SODIUM (PORCINE) LOCK FLUSH IV SOLN 100 UNIT/ML 100 UNIT/ML
5 SOLUTION INTRAVENOUS ONCE
Status: COMPLETED | OUTPATIENT
Start: 2024-02-28 | End: 2024-02-28

## 2024-02-28 RX ORDER — FAMOTIDINE 10 MG/ML
INJECTION, SOLUTION INTRAVENOUS
Status: COMPLETED
Start: 2024-02-28 | End: 2024-02-28

## 2024-02-28 RX ORDER — HEPARIN SODIUM (PORCINE) LOCK FLUSH IV SOLN 100 UNIT/ML 100 UNIT/ML
5 SOLUTION INTRAVENOUS ONCE
OUTPATIENT
Start: 2024-02-28

## 2024-02-28 RX ORDER — DIPHENHYDRAMINE HYDROCHLORIDE 50 MG/ML
25 INJECTION INTRAMUSCULAR; INTRAVENOUS ONCE
Status: COMPLETED | OUTPATIENT
Start: 2024-02-28 | End: 2024-02-28

## 2024-02-28 RX ORDER — SODIUM CHLORIDE 9 MG/ML
10 INJECTION, SOLUTION INTRAMUSCULAR; INTRAVENOUS; SUBCUTANEOUS ONCE
OUTPATIENT
Start: 2024-02-28

## 2024-02-28 RX ORDER — HEPARIN SODIUM (PORCINE) LOCK FLUSH IV SOLN 100 UNIT/ML 100 UNIT/ML
SOLUTION INTRAVENOUS
Status: COMPLETED
Start: 2024-02-28 | End: 2024-02-28

## 2024-02-28 RX ORDER — DIPHENHYDRAMINE HYDROCHLORIDE 50 MG/ML
INJECTION INTRAMUSCULAR; INTRAVENOUS
Status: COMPLETED
Start: 2024-02-28 | End: 2024-02-28

## 2024-02-28 RX ADMIN — DIPHENHYDRAMINE HYDROCHLORIDE 25 MG: 50 INJECTION INTRAMUSCULAR; INTRAVENOUS at 12:58:00

## 2024-02-28 RX ADMIN — HEPARIN SODIUM (PORCINE) LOCK FLUSH IV SOLN 100 UNIT/ML 500 UNITS: 100 SOLUTION INTRAVENOUS at 14:48:00

## 2024-02-28 RX ADMIN — FAMOTIDINE 20 MG: 10 INJECTION, SOLUTION INTRAVENOUS at 13:03:00

## 2024-02-28 NOTE — PROGRESS NOTES
Pt here for C5D8 TAXOL.  Arrives Ambulating independently, accompanied by Self     Patient was evaluated today by Treatment Nurse.    Oral medications included in this regimen:  no    Patient confirms comprehension of cancer treatment schedule:  yes    Pregnancy screening:  Not applicable    Modifications in dose or schedule:  No    Medications appearance and physical integrity checked by RN: yes.    Chemotherapy IV pump settings verified by 2 RNs:  Yes.  Frequency of blood return and site check throughout administration: Prior to administration, Prior to each drug, and At completion of therapy     Infusion/treatment outcome:  patient tolerated treatment without incident    Education Record    Learner:  Patient  Barriers / Limitations:  None  Method:  Discussion  Education / instructions given:  PLAN OF CARE  Outcome:  Shows understanding    Pt given information on constipation per [facility] information sheet.  Pt says the few times she takes compazine or zofran she becomes slightly constipated. Pt verbalized understanding regarding protocol  She was instructed to call if she had any questions.     Discharged Home, Ambulating independently, accompanied by:Self    Patient/family verbalized understanding of future appointments: by printed AVS

## 2024-03-06 ENCOUNTER — OFFICE VISIT (OUTPATIENT)
Dept: HEMATOLOGY/ONCOLOGY | Facility: HOSPITAL | Age: 49
End: 2024-03-06
Attending: INTERNAL MEDICINE
Payer: COMMERCIAL

## 2024-03-06 ENCOUNTER — NURSE ONLY (OUTPATIENT)
Dept: HEMATOLOGY/ONCOLOGY | Facility: HOSPITAL | Age: 49
End: 2024-03-06
Attending: NURSE PRACTITIONER
Payer: COMMERCIAL

## 2024-03-06 VITALS
DIASTOLIC BLOOD PRESSURE: 59 MMHG | BODY MASS INDEX: 34 KG/M2 | HEART RATE: 80 BPM | RESPIRATION RATE: 18 BRPM | TEMPERATURE: 98 F | WEIGHT: 204.5 LBS | OXYGEN SATURATION: 99 % | SYSTOLIC BLOOD PRESSURE: 118 MMHG

## 2024-03-06 DIAGNOSIS — C50.112 MALIGNANT NEOPLASM OF CENTRAL PORTION OF LEFT BREAST IN FEMALE, ESTROGEN RECEPTOR POSITIVE (HCC): Primary | ICD-10-CM

## 2024-03-06 DIAGNOSIS — Z17.0 MALIGNANT NEOPLASM OF CENTRAL PORTION OF LEFT BREAST IN FEMALE, ESTROGEN RECEPTOR POSITIVE (HCC): Primary | ICD-10-CM

## 2024-03-06 DIAGNOSIS — Z45.2 ENCOUNTER FOR CENTRAL LINE CARE: ICD-10-CM

## 2024-03-06 LAB
BASOPHILS # BLD AUTO: 0.07 X10(3) UL (ref 0–0.2)
BASOPHILS NFR BLD AUTO: 1.7 %
DEPRECATED RDW RBC AUTO: 62.4 FL (ref 35.1–46.3)
EOSINOPHIL # BLD AUTO: 0.02 X10(3) UL (ref 0–0.7)
EOSINOPHIL NFR BLD AUTO: 0.5 %
ERYTHROCYTE [DISTWIDTH] IN BLOOD BY AUTOMATED COUNT: 17.7 % (ref 11–15)
HCT VFR BLD AUTO: 27.4 %
HGB BLD-MCNC: 9.5 G/DL
IMM GRANULOCYTES # BLD AUTO: 0.02 X10(3) UL (ref 0–1)
IMM GRANULOCYTES NFR BLD: 0.5 %
LYMPHOCYTES # BLD AUTO: 0.71 X10(3) UL (ref 1–4)
LYMPHOCYTES NFR BLD AUTO: 17.7 %
MCH RBC QN AUTO: 34.7 PG (ref 26–34)
MCHC RBC AUTO-ENTMCNC: 34.7 G/DL (ref 31–37)
MCV RBC AUTO: 100 FL
MONOCYTES # BLD AUTO: 0.36 X10(3) UL (ref 0.1–1)
MONOCYTES NFR BLD AUTO: 9 %
NEUTROPHILS # BLD AUTO: 2.83 X10 (3) UL (ref 1.5–7.7)
NEUTROPHILS # BLD AUTO: 2.83 X10(3) UL (ref 1.5–7.7)
NEUTROPHILS NFR BLD AUTO: 70.6 %
PLATELET # BLD AUTO: 322 10(3)UL (ref 150–450)
RBC # BLD AUTO: 2.74 X10(6)UL
WBC # BLD AUTO: 4 X10(3) UL (ref 4–11)

## 2024-03-06 PROCEDURE — S0028 INJECTION, FAMOTIDINE, 20 MG: HCPCS

## 2024-03-06 PROCEDURE — 36591 DRAW BLOOD OFF VENOUS DEVICE: CPT

## 2024-03-06 PROCEDURE — 96413 CHEMO IV INFUSION 1 HR: CPT

## 2024-03-06 PROCEDURE — 96375 TX/PRO/DX INJ NEW DRUG ADDON: CPT

## 2024-03-06 PROCEDURE — 85025 COMPLETE CBC W/AUTO DIFF WBC: CPT

## 2024-03-06 RX ORDER — HEPARIN SODIUM (PORCINE) LOCK FLUSH IV SOLN 100 UNIT/ML 100 UNIT/ML
5 SOLUTION INTRAVENOUS ONCE
Status: COMPLETED | OUTPATIENT
Start: 2024-03-06 | End: 2024-03-06

## 2024-03-06 RX ORDER — HEPARIN SODIUM (PORCINE) LOCK FLUSH IV SOLN 100 UNIT/ML 100 UNIT/ML
5 SOLUTION INTRAVENOUS ONCE
OUTPATIENT
Start: 2024-03-06

## 2024-03-06 RX ORDER — DIPHENHYDRAMINE HYDROCHLORIDE 50 MG/ML
INJECTION INTRAMUSCULAR; INTRAVENOUS
Status: COMPLETED
Start: 2024-03-06 | End: 2024-03-06

## 2024-03-06 RX ORDER — SODIUM CHLORIDE 9 MG/ML
10 INJECTION, SOLUTION INTRAMUSCULAR; INTRAVENOUS; SUBCUTANEOUS ONCE
OUTPATIENT
Start: 2024-03-06

## 2024-03-06 RX ORDER — FAMOTIDINE 10 MG/ML
20 INJECTION, SOLUTION INTRAVENOUS ONCE
Status: COMPLETED | OUTPATIENT
Start: 2024-03-06 | End: 2024-03-06

## 2024-03-06 RX ORDER — DIPHENHYDRAMINE HYDROCHLORIDE 50 MG/ML
25 INJECTION INTRAMUSCULAR; INTRAVENOUS ONCE
Status: COMPLETED | OUTPATIENT
Start: 2024-03-06 | End: 2024-03-06

## 2024-03-06 RX ORDER — FAMOTIDINE 10 MG/ML
INJECTION, SOLUTION INTRAVENOUS
Status: COMPLETED
Start: 2024-03-06 | End: 2024-03-06

## 2024-03-06 RX ADMIN — HEPARIN SODIUM (PORCINE) LOCK FLUSH IV SOLN 100 UNIT/ML 500 UNITS: 100 SOLUTION INTRAVENOUS at 15:37:00

## 2024-03-06 RX ADMIN — FAMOTIDINE 20 MG: 10 INJECTION, SOLUTION INTRAVENOUS at 14:04:00

## 2024-03-06 RX ADMIN — DIPHENHYDRAMINE HYDROCHLORIDE 25 MG: 50 INJECTION INTRAMUSCULAR; INTRAVENOUS at 14:07:00

## 2024-03-06 NOTE — PROGRESS NOTES
Pt here for C5D12 Drug(s)Taxol.  Arrives Ambulating independently, accompanied by Self     Patient was evaluated today by Treatment Nurse.  Oral medications included in this regimen:  no  Patient confirms comprehension of cancer treatment schedule:  yes  Pregnancy screening:  Denies possibility of pregnancy  Modifications in dose or schedule:  No  Medications appearance and physical integrity checked by RN: yes.  Chemotherapy IV pump settings verified by 2 RNs:  Yes.  Frequency of blood return and site check throughout administration: Prior to administration, Prior to each drug, Every 2-3 ml IVP, and At completion of therapy   Infusion/treatment outcome:  patient tolerated treatment without incident    Education Record    Learner:  Patient  Barriers / Limitations:  None  Method:  Reinforcement  Education / instructions given:  Plan of care,medications and side effects, side effect management.  Outcome:  Shows understanding    Discharged Home, Ambulating independently, accompanied by:Self    Patient/family verbalized understanding of future appointments: by [employer] messaging

## 2024-03-13 ENCOUNTER — NURSE ONLY (OUTPATIENT)
Dept: HEMATOLOGY/ONCOLOGY | Facility: HOSPITAL | Age: 49
End: 2024-03-13
Attending: INTERNAL MEDICINE
Payer: COMMERCIAL

## 2024-03-13 VITALS
HEIGHT: 65 IN | WEIGHT: 203.63 LBS | RESPIRATION RATE: 16 BRPM | SYSTOLIC BLOOD PRESSURE: 120 MMHG | DIASTOLIC BLOOD PRESSURE: 74 MMHG | OXYGEN SATURATION: 98 % | TEMPERATURE: 99 F | BODY MASS INDEX: 33.92 KG/M2 | HEART RATE: 74 BPM

## 2024-03-13 DIAGNOSIS — Z45.2 ENCOUNTER FOR CENTRAL LINE CARE: ICD-10-CM

## 2024-03-13 DIAGNOSIS — Z17.0 MALIGNANT NEOPLASM OF CENTRAL PORTION OF LEFT BREAST IN FEMALE, ESTROGEN RECEPTOR POSITIVE (HCC): Primary | ICD-10-CM

## 2024-03-13 DIAGNOSIS — C50.112 MALIGNANT NEOPLASM OF CENTRAL PORTION OF LEFT BREAST IN FEMALE, ESTROGEN RECEPTOR POSITIVE (HCC): Primary | ICD-10-CM

## 2024-03-13 DIAGNOSIS — Z09 CHEMOTHERAPY FOLLOW-UP EXAMINATION: ICD-10-CM

## 2024-03-13 LAB
ALBUMIN SERPL-MCNC: 4.4 G/DL (ref 3.2–4.8)
ALBUMIN/GLOB SERPL: 1.6 {RATIO} (ref 1–2)
ALP LIVER SERPL-CCNC: 89 U/L
ALT SERPL-CCNC: 61 U/L
ANION GAP SERPL CALC-SCNC: <0 MMOL/L (ref 0–18)
AST SERPL-CCNC: 48 U/L (ref ?–34)
BASOPHILS # BLD AUTO: 0.06 X10(3) UL (ref 0–0.2)
BASOPHILS NFR BLD AUTO: 1.8 %
BILIRUB SERPL-MCNC: 0.4 MG/DL (ref 0.3–1.2)
BUN BLD-MCNC: 10 MG/DL (ref 9–23)
BUN/CREAT SERPL: 16.9 (ref 10–20)
CALCIUM BLD-MCNC: 9.7 MG/DL (ref 8.7–10.4)
CHLORIDE SERPL-SCNC: 112 MMOL/L (ref 98–112)
CO2 SERPL-SCNC: 29 MMOL/L (ref 21–32)
CREAT BLD-MCNC: 0.59 MG/DL
DEPRECATED RDW RBC AUTO: 65.1 FL (ref 35.1–46.3)
EGFRCR SERPLBLD CKD-EPI 2021: 112 ML/MIN/1.73M2 (ref 60–?)
EOSINOPHIL # BLD AUTO: 0.13 X10(3) UL (ref 0–0.7)
EOSINOPHIL NFR BLD AUTO: 4 %
ERYTHROCYTE [DISTWIDTH] IN BLOOD BY AUTOMATED COUNT: 17.6 % (ref 11–15)
GLOBULIN PLAS-MCNC: 2.8 G/DL (ref 2.8–4.4)
GLUCOSE BLD-MCNC: 108 MG/DL (ref 70–99)
HCT VFR BLD AUTO: 28 %
HGB BLD-MCNC: 9.4 G/DL
IMM GRANULOCYTES # BLD AUTO: 0.02 X10(3) UL (ref 0–1)
IMM GRANULOCYTES NFR BLD: 0.6 %
LYMPHOCYTES # BLD AUTO: 0.71 X10(3) UL (ref 1–4)
LYMPHOCYTES NFR BLD AUTO: 21.6 %
MCH RBC QN AUTO: 34.2 PG (ref 26–34)
MCHC RBC AUTO-ENTMCNC: 33.6 G/DL (ref 31–37)
MCV RBC AUTO: 101.8 FL
MONOCYTES # BLD AUTO: 0.33 X10(3) UL (ref 0.1–1)
MONOCYTES NFR BLD AUTO: 10 %
NEUTROPHILS # BLD AUTO: 2.04 X10 (3) UL (ref 1.5–7.7)
NEUTROPHILS # BLD AUTO: 2.04 X10(3) UL (ref 1.5–7.7)
NEUTROPHILS NFR BLD AUTO: 62 %
OSMOLALITY SERPL CALC.SUM OF ELEC: 278 MOSM/KG (ref 275–295)
PLATELET # BLD AUTO: 264 10(3)UL (ref 150–450)
PLATELET MORPHOLOGY: NORMAL
POTASSIUM SERPL-SCNC: 3.6 MMOL/L (ref 3.5–5.1)
PROT SERPL-MCNC: 7.2 G/DL (ref 5.7–8.2)
RBC # BLD AUTO: 2.75 X10(6)UL
SODIUM SERPL-SCNC: 134 MMOL/L (ref 136–145)
WBC # BLD AUTO: 3.3 X10(3) UL (ref 4–11)

## 2024-03-13 PROCEDURE — 99215 OFFICE O/P EST HI 40 MIN: CPT | Performed by: INTERNAL MEDICINE

## 2024-03-13 PROCEDURE — 96413 CHEMO IV INFUSION 1 HR: CPT

## 2024-03-13 PROCEDURE — 85025 COMPLETE CBC W/AUTO DIFF WBC: CPT

## 2024-03-13 PROCEDURE — 80053 COMPREHEN METABOLIC PANEL: CPT

## 2024-03-13 PROCEDURE — 96375 TX/PRO/DX INJ NEW DRUG ADDON: CPT

## 2024-03-13 PROCEDURE — S0028 INJECTION, FAMOTIDINE, 20 MG: HCPCS

## 2024-03-13 RX ORDER — HEPARIN SODIUM (PORCINE) LOCK FLUSH IV SOLN 100 UNIT/ML 100 UNIT/ML
SOLUTION INTRAVENOUS
Status: DISCONTINUED
Start: 2024-03-13 | End: 2024-03-13

## 2024-03-13 RX ORDER — FAMOTIDINE 10 MG/ML
20 INJECTION, SOLUTION INTRAVENOUS ONCE
Status: COMPLETED | OUTPATIENT
Start: 2024-03-13 | End: 2024-03-13

## 2024-03-13 RX ORDER — HEPARIN SODIUM (PORCINE) LOCK FLUSH IV SOLN 100 UNIT/ML 100 UNIT/ML
5 SOLUTION INTRAVENOUS ONCE
Status: DISCONTINUED | OUTPATIENT
Start: 2024-03-13 | End: 2024-03-13

## 2024-03-13 RX ORDER — FAMOTIDINE 10 MG/ML
20 INJECTION, SOLUTION INTRAVENOUS ONCE
OUTPATIENT
Start: 2024-03-27

## 2024-03-13 RX ORDER — DIPHENHYDRAMINE HYDROCHLORIDE 50 MG/ML
25 INJECTION INTRAMUSCULAR; INTRAVENOUS ONCE
Status: COMPLETED | OUTPATIENT
Start: 2024-03-13 | End: 2024-03-13

## 2024-03-13 RX ORDER — DIPHENHYDRAMINE HYDROCHLORIDE 50 MG/ML
25 INJECTION INTRAMUSCULAR; INTRAVENOUS ONCE
OUTPATIENT
Start: 2024-03-27

## 2024-03-13 RX ORDER — FAMOTIDINE 10 MG/ML
20 INJECTION, SOLUTION INTRAVENOUS ONCE
OUTPATIENT
Start: 2024-03-20

## 2024-03-13 RX ORDER — SODIUM CHLORIDE 9 MG/ML
10 INJECTION, SOLUTION INTRAMUSCULAR; INTRAVENOUS; SUBCUTANEOUS ONCE
OUTPATIENT
Start: 2024-03-13

## 2024-03-13 RX ORDER — FAMOTIDINE 10 MG/ML
20 INJECTION, SOLUTION INTRAVENOUS ONCE
Status: CANCELLED | OUTPATIENT
Start: 2024-03-13

## 2024-03-13 RX ORDER — DIPHENHYDRAMINE HYDROCHLORIDE 50 MG/ML
25 INJECTION INTRAMUSCULAR; INTRAVENOUS ONCE
OUTPATIENT
Start: 2024-03-20

## 2024-03-13 RX ORDER — DIPHENHYDRAMINE HYDROCHLORIDE 50 MG/ML
INJECTION INTRAMUSCULAR; INTRAVENOUS
Status: COMPLETED
Start: 2024-03-13 | End: 2024-03-13

## 2024-03-13 RX ORDER — FAMOTIDINE 10 MG/ML
INJECTION, SOLUTION INTRAVENOUS
Status: COMPLETED
Start: 2024-03-13 | End: 2024-03-13

## 2024-03-13 RX ORDER — DIPHENHYDRAMINE HYDROCHLORIDE 50 MG/ML
25 INJECTION INTRAMUSCULAR; INTRAVENOUS ONCE
Status: CANCELLED | OUTPATIENT
Start: 2024-03-13

## 2024-03-13 RX ORDER — HEPARIN SODIUM (PORCINE) LOCK FLUSH IV SOLN 100 UNIT/ML 100 UNIT/ML
5 SOLUTION INTRAVENOUS ONCE
OUTPATIENT
Start: 2024-03-13

## 2024-03-13 RX ADMIN — FAMOTIDINE 20 MG: 10 INJECTION, SOLUTION INTRAVENOUS at 13:30:00

## 2024-03-13 RX ADMIN — DIPHENHYDRAMINE HYDROCHLORIDE 25 MG: 50 INJECTION INTRAMUSCULAR; INTRAVENOUS at 13:24:00

## 2024-03-13 NOTE — PROGRESS NOTES
Pt here for C6D1 TAXOL.  Arrives Ambulating independently, accompanied by Self     Patient was evaluated today by Dr Heaton    Oral medications included in this regimen:  no    Patient confirms comprehension of cancer treatment schedule:  yes    Pregnancy screening:  Not applicable    Modifications in dose or schedule:  No    Medications appearance and physical integrity checked by RN: yes.    Chemotherapy IV pump settings verified by 2 RNs:  Yes.  Frequency of blood return and site check throughout administration: Prior to administration, Prior to each drug, and At completion of therapy     Infusion/treatment outcome:  patient tolerated treatment without incident    Education Record    Learner:  Patient  Barriers / Limitations:  None  Method:  Discussion  Education / instructions given:  PLAN OF CARE  Outcome:  Shows understanding       Discharged Home, Ambulating independently, accompanied by:Self    Patient/family verbalized understanding of future appointments: marga

## 2024-03-13 NOTE — PROGRESS NOTES
HPI   Alisia Berg is a 48 year old female here at the request of NORBERTO SERNA MD for evaluation of   Encounter Diagnoses   Name Primary?    Malignant neoplasm of central portion of left breast in female, estrogen receptor positive (HCC) Yes    Chemotherapy follow-up examination        Patient right here status post left-sided lumpectomy with axillary lymph node dissection on 11/6/2023.     Feels arm feels stretching when lifting.      Patient is s/p cycle 1 of weekly Taxol.  She has completed her 4 cycles of dose dense AC.    Fatigue:  Yes, but less.    Fevers:  No    Appetite/taste changes:  Yes, improving.    Mucositis:  Yes, improved with rinsing.    Weight changes:  No    Nausea/vomiting:  Yes, but less nausea, took meds two days.    Diarrhea:  No    Constipation:  No.  Eating prunes and improved, no issues with hemorrhoids.     Peripheral neuropathy:  Yes, finger tips, the same.    Skin: hyperpigmentation at finger nails.    Sometimes pain at L breast surgical site.      LMP January 2024    ECOG PS  1- mild fatigue    Review of Systems:   Review of Systems   HENT:           Pain in the L ear but gone   Respiratory:  Negative for cough and shortness of breath.    Cardiovascular:  Negative for chest pain.   Gastrointestinal:  Negative for abdominal pain.   Endocrine: Positive for hot flashes.   Genitourinary:  Positive for frequency. Negative for dysuria.    Musculoskeletal:  Negative for arthralgias and back pain.   Neurological:  Negative for dizziness and headaches.   Hematological:  Negative for adenopathy. Does not bruise/bleed easily.   Psychiatric/Behavioral:  Negative for sleep disturbance.            Current Outpatient Medications   Medication Sig Dispense Refill    prochlorperazine (COMPAZINE) 10 mg tablet Take 1 tablet (10 mg total) by mouth every 8 (eight) hours as needed for Nausea. 30 tablet 3    ondansetron (ZOFRAN) 8 MG tablet Take 1 tablet (8 mg total) by mouth every 8 (eight)  hours as needed for Nausea. 30 tablet 3    carBAMazepine 200 MG Oral Tab Take 1 tablet (200 mg total) by mouth 2 (two) times daily.      losartan 50 MG Oral Tab Take 1 tablet (50 mg total) by mouth daily.       Allergies:   No Known Allergies    Past Medical History:   Diagnosis Date    High blood pressure     Hypertension     Trigeminal neuralgia of left side of face      Past Surgical History:   Procedure Laterality Date    LUMPECTOMY LEFT  2023    NEEDLE BIOPSY RIGHT Right     bx done at age 20yrs old          x2     Social History     Socioeconomic History    Marital status:    Tobacco Use    Smoking status: Never    Smokeless tobacco: Never   Substance and Sexual Activity    Alcohol use: Never    Drug use: Never       Family History   Problem Relation Age of Onset    Breast Cancer Mother 34    Cancer Paternal Grandfather         unknown type    Ovarian Cancer Neg          PHYSICAL EXAM:    /74 (BP Location: Right arm, Patient Position: Sitting, Cuff Size: large)   Pulse 74   Temp 98.5 °F (36.9 °C) (Oral)   Resp 16   Ht 1.651 m (5' 5\")   Wt 92.4 kg (203 lb 9.6 oz)   LMP 12/15/2023 (Approximate)   SpO2 98%   BMI 33.88 kg/m²   Wt Readings from Last 6 Encounters:   24 92.4 kg (203 lb 9.6 oz)   24 92.8 kg (204 lb 8 oz)   24 93 kg (205 lb)   24 91.6 kg (202 lb)   24 92.5 kg (204 lb)   24 92.7 kg (204 lb 6.4 oz)     Physical Exam  General: Patient is alert, not in acute distress.  HEENT: EOMs intact. PERRL. Oropharynx is clear.   Neck: No JVD. No palpable lymphadenopathy. Neck is supple.  Chest: Clear to auscultation.  Heart: Regular rate and rhythm.   Abdomen: Soft, non tender with good bowel sounds.  Extremities: No edema.  Neurological: Grossly intact.   Lymphatics: There is no palpable lymphadenopathy throughout in the cervical, supraclavicular, axillary, or inguinal regions.  Psych/Depression: nl  Skin:  hyperpigmentation of the finger nails and  dorsum of hands.  Alopecia.        ASSESSMENT/PLAN:     1. Malignant neoplasm of central portion of left breast in female, estrogen receptor positive (HCC)    2. Chemotherapy follow-up examination        1. Malignant neoplasm of central portion of left breast in female, estrogen receptor positive    Cancer Staging   Malignant neoplasm of central portion of left breast in female, estrogen receptor positive (HCC)  Staging form: Breast, AJCC 8th Edition  - Clinical stage from 9/29/2023: Stage IB (cT1c, cN1(f), cM0, G2, ER+, OH+, HER2-) - Signed by Montrell Heaton MD on 9/29/2023  - Pathologic stage from 11/15/2023: Stage IB (pT1c, pN2a, cM0, G2, ER+, OH+, HER2-) - Signed by Montrell Heaton MD on 11/15/2023    -Genetic testing was negative.    -Patient underwent a left-sided lumpectomy with sentinel lymph node which was positive, and completion axillary dissection on 11/6/2023.  She had 4 of 25 positive lymph nodes.    -Discussed with patient standard of care is to proceed with adjuvant chemotherapy within 4 to 8 weeks postoperatively.  Given her extensive beatriz involvement, recommend to proceed with adjuvant chemotherapy with dose dense Adriamycin Cytoxan followed by weekly Taxol.  Patient will have baseline cardiac function assessment with either a TTE or MUGA.  Also recommend port placement for safe administration of treatment. Briefly discussed potential SE of treatment, will discuss further on teaching session.      Completed 4 cycles of adjuvant DD AC,    S/p cycle 1 of weekly taxol    Proceed with cycle 2 of weekly taxol.    - After patient completes chemotherapy, she will proceed with radiation treatment.    - Will certainly benefit from adjuvant endocrine therapy, based on her hormone status.  Baseline hormone levels obtained, and patient appears to be in the postmenopausal range.  We will repeat this again in 1 year after last period.  Duration of adjuvant endocrine therapy will be for at least 5 years,  however this could be up to 10 years.  Determination on duration of adjuvant endocrine therapy pending on some more emerging data regarding which patient population benefits from extended endocrine therapy.  Given his beatriz status, discussed with patient that would favor up to 10 years.    Return in about 3 weeks (around 4/3/2024) for chemotherapy follow-up, with labs.    No orders of the defined types were placed in this encounter.    MDM high risk.  Results From Past 48 Hours:  Recent Results (from the past 48 hour(s))   Comp Metabolic Panel (14)    Collection Time: 03/13/24 11:30 AM   Result Value Ref Range    Glucose 108 (H) 70 - 99 mg/dL    Sodium 134 (L) 136 - 145 mmol/L    Potassium 3.6 3.5 - 5.1 mmol/L    Chloride 112 98 - 112 mmol/L    CO2 29.0 21.0 - 32.0 mmol/L    Anion Gap <0 (L) 0 - 18 mmol/L    BUN 10 9 - 23 mg/dL    Creatinine 0.59 0.55 - 1.02 mg/dL    BUN/CREA Ratio 16.9 10.0 - 20.0    Calcium, Total 9.7 8.7 - 10.4 mg/dL    Calculated Osmolality 278 275 - 295 mOsm/kg    eGFR-Cr 112 >=60 mL/min/1.73m2    ALT 61 (H) 10 - 49 U/L    AST 48 (H) <=34 U/L    Alkaline Phosphatase 89 39 - 100 U/L    Bilirubin, Total 0.4 0.3 - 1.2 mg/dL    Total Protein 7.2 5.7 - 8.2 g/dL    Albumin 4.4 3.2 - 4.8 g/dL    Globulin  2.8 2.8 - 4.4 g/dL    A/G Ratio 1.6 1.0 - 2.0    Patient Fasting for CMP? Patient not present    CBC W/ DIFFERENTIAL    Collection Time: 03/13/24 11:30 AM   Result Value Ref Range    WBC 3.3 (L) 4.0 - 11.0 x10(3) uL    RBC 2.75 (L) 3.80 - 5.30 x10(6)uL    HGB 9.4 (L) 12.0 - 16.0 g/dL    HCT 28.0 (L) 35.0 - 48.0 %    .8 (H) 80.0 - 100.0 fL    MCH 34.2 (H) 26.0 - 34.0 pg    MCHC 33.6 31.0 - 37.0 g/dL    RDW-SD 65.1 (H) 35.1 - 46.3 fL    RDW 17.6 (H) 11.0 - 15.0 %    .0 150.0 - 450.0 10(3)uL    Neutrophil Absolute Prelim 2.04 1.50 - 7.70 x10 (3) uL    Neutrophil Absolute 2.04 1.50 - 7.70 x10(3) uL    Lymphocyte Absolute 0.71 (L) 1.00 - 4.00 x10(3) uL    Monocyte Absolute 0.33 0.10 - 1.00  x10(3) uL    Eosinophil Absolute 0.13 0.00 - 0.70 x10(3) uL    Basophil Absolute 0.06 0.00 - 0.20 x10(3) uL    Immature Granulocyte Absolute 0.02 0.00 - 1.00 x10(3) uL    Neutrophil % 62.0 %    Lymphocyte % 21.6 %    Monocyte % 10.0 %    Eosinophil % 4.0 %    Basophil % 1.8 %    Immature Granulocyte % 0.6 %   RBC Morphology Scan    Collection Time: 03/13/24 11:30 AM   Result Value Ref Range    RBC Morphology See morphology below (A) Normal, Slide reviewed, see previous RBC morphology.    Platelet Morphology Normal Normal    Macrocytosis 1+      Microcytosis 1+      Polychromasia 1+      Tear Drop Cells 1+           Imaging & Referrals:  None

## 2024-03-20 ENCOUNTER — OFFICE VISIT (OUTPATIENT)
Dept: HEMATOLOGY/ONCOLOGY | Facility: HOSPITAL | Age: 49
End: 2024-03-20
Attending: INTERNAL MEDICINE
Payer: COMMERCIAL

## 2024-03-20 VITALS
BODY MASS INDEX: 34 KG/M2 | SYSTOLIC BLOOD PRESSURE: 147 MMHG | WEIGHT: 203.38 LBS | RESPIRATION RATE: 16 BRPM | TEMPERATURE: 99 F | OXYGEN SATURATION: 100 % | HEART RATE: 76 BPM | DIASTOLIC BLOOD PRESSURE: 64 MMHG

## 2024-03-20 DIAGNOSIS — C50.112 MALIGNANT NEOPLASM OF CENTRAL PORTION OF LEFT BREAST IN FEMALE, ESTROGEN RECEPTOR POSITIVE (HCC): Primary | ICD-10-CM

## 2024-03-20 DIAGNOSIS — Z17.0 MALIGNANT NEOPLASM OF CENTRAL PORTION OF LEFT BREAST IN FEMALE, ESTROGEN RECEPTOR POSITIVE (HCC): Primary | ICD-10-CM

## 2024-03-20 DIAGNOSIS — Z45.2 ENCOUNTER FOR CENTRAL LINE CARE: ICD-10-CM

## 2024-03-20 LAB
BASOPHILS # BLD AUTO: 0.05 X10(3) UL (ref 0–0.2)
BASOPHILS NFR BLD AUTO: 1.3 %
DEPRECATED RDW RBC AUTO: 61.6 FL (ref 35.1–46.3)
EOSINOPHIL # BLD AUTO: 0.2 X10(3) UL (ref 0–0.7)
EOSINOPHIL NFR BLD AUTO: 5.1 %
ERYTHROCYTE [DISTWIDTH] IN BLOOD BY AUTOMATED COUNT: 16.1 % (ref 11–15)
HCT VFR BLD AUTO: 31 %
HGB BLD-MCNC: 10.1 G/DL
IMM GRANULOCYTES # BLD AUTO: 0.01 X10(3) UL (ref 0–1)
IMM GRANULOCYTES NFR BLD: 0.3 %
LYMPHOCYTES # BLD AUTO: 0.75 X10(3) UL (ref 1–4)
LYMPHOCYTES NFR BLD AUTO: 19.1 %
MCH RBC QN AUTO: 33.8 PG (ref 26–34)
MCHC RBC AUTO-ENTMCNC: 32.6 G/DL (ref 31–37)
MCV RBC AUTO: 103.7 FL
MONOCYTES # BLD AUTO: 0.33 X10(3) UL (ref 0.1–1)
MONOCYTES NFR BLD AUTO: 8.4 %
NEUTROPHILS # BLD AUTO: 2.59 X10 (3) UL (ref 1.5–7.7)
NEUTROPHILS # BLD AUTO: 2.59 X10(3) UL (ref 1.5–7.7)
NEUTROPHILS NFR BLD AUTO: 65.8 %
PLATELET # BLD AUTO: 294 10(3)UL (ref 150–450)
RBC # BLD AUTO: 2.99 X10(6)UL
WBC # BLD AUTO: 3.9 X10(3) UL (ref 4–11)

## 2024-03-20 PROCEDURE — 96375 TX/PRO/DX INJ NEW DRUG ADDON: CPT

## 2024-03-20 PROCEDURE — 85025 COMPLETE CBC W/AUTO DIFF WBC: CPT

## 2024-03-20 PROCEDURE — S0028 INJECTION, FAMOTIDINE, 20 MG: HCPCS

## 2024-03-20 PROCEDURE — 96367 TX/PROPH/DG ADDL SEQ IV INF: CPT

## 2024-03-20 PROCEDURE — 96413 CHEMO IV INFUSION 1 HR: CPT

## 2024-03-20 RX ORDER — HEPARIN SODIUM (PORCINE) LOCK FLUSH IV SOLN 100 UNIT/ML 100 UNIT/ML
5 SOLUTION INTRAVENOUS ONCE
Status: COMPLETED | OUTPATIENT
Start: 2024-03-20 | End: 2024-03-20

## 2024-03-20 RX ORDER — DIPHENHYDRAMINE HYDROCHLORIDE 50 MG/ML
INJECTION INTRAMUSCULAR; INTRAVENOUS
Status: COMPLETED
Start: 2024-03-20 | End: 2024-03-20

## 2024-03-20 RX ORDER — FAMOTIDINE 10 MG/ML
20 INJECTION, SOLUTION INTRAVENOUS ONCE
Status: COMPLETED | OUTPATIENT
Start: 2024-03-20 | End: 2024-03-20

## 2024-03-20 RX ORDER — FAMOTIDINE 10 MG/ML
INJECTION, SOLUTION INTRAVENOUS
Status: COMPLETED
Start: 2024-03-20 | End: 2024-03-20

## 2024-03-20 RX ORDER — HEPARIN SODIUM (PORCINE) LOCK FLUSH IV SOLN 100 UNIT/ML 100 UNIT/ML
5 SOLUTION INTRAVENOUS ONCE
OUTPATIENT
Start: 2024-03-20

## 2024-03-20 RX ORDER — DIPHENHYDRAMINE HYDROCHLORIDE 50 MG/ML
25 INJECTION INTRAMUSCULAR; INTRAVENOUS ONCE
Status: COMPLETED | OUTPATIENT
Start: 2024-03-20 | End: 2024-03-20

## 2024-03-20 RX ORDER — SODIUM CHLORIDE 9 MG/ML
10 INJECTION, SOLUTION INTRAMUSCULAR; INTRAVENOUS; SUBCUTANEOUS ONCE
OUTPATIENT
Start: 2024-03-20

## 2024-03-20 RX ADMIN — DIPHENHYDRAMINE HYDROCHLORIDE 25 MG: 50 INJECTION INTRAMUSCULAR; INTRAVENOUS at 09:50:00

## 2024-03-20 RX ADMIN — FAMOTIDINE 20 MG: 10 INJECTION, SOLUTION INTRAVENOUS at 09:53:00

## 2024-03-20 RX ADMIN — HEPARIN SODIUM (PORCINE) LOCK FLUSH IV SOLN 100 UNIT/ML 500 UNITS: 100 SOLUTION INTRAVENOUS at 11:51:00

## 2024-03-20 NOTE — PROGRESS NOTES
Patient here for C6D8 Taxol.  Arrives Ambulating independently, accompanied by Self. Labs reviewed by this RN. Patient denies new concerns or complaints.    Patient was evaluated today by Treatment Nurse.    Oral medications included in this regimen:  no    Patient confirms comprehension of cancer treatment schedule:  yes    Pregnancy screening:  Denies possibility of pregnancy    Modifications in dose or schedule:  No    Medications appearance and physical integrity checked by RN: yes.    Chemotherapy IV pump settings verified by 2 RNs:  Yes.  Frequency of blood return and site check throughout administration: Prior to administration, Prior to each drug, and At completion of therapy     Infusion/treatment outcome:  patient tolerated treatment without incident    Education Record    Learner:  Patient  Barriers / Limitations:  None  Method:  Reinforcement  Education / instructions given:  Reinforced plan of care and treatment regimen  Outcome:  Shows understanding    Discharged Home, Ambulating independently, accompanied by:Self    Patient/family verbalized understanding of future appointments: by [employer] messaging

## 2024-03-27 ENCOUNTER — NURSE ONLY (OUTPATIENT)
Dept: HEMATOLOGY/ONCOLOGY | Facility: HOSPITAL | Age: 49
End: 2024-03-27
Attending: INTERNAL MEDICINE
Payer: COMMERCIAL

## 2024-03-27 VITALS
SYSTOLIC BLOOD PRESSURE: 128 MMHG | HEART RATE: 79 BPM | OXYGEN SATURATION: 100 % | RESPIRATION RATE: 16 BRPM | TEMPERATURE: 98 F | BODY MASS INDEX: 34 KG/M2 | WEIGHT: 204.63 LBS | DIASTOLIC BLOOD PRESSURE: 66 MMHG

## 2024-03-27 DIAGNOSIS — C50.112 MALIGNANT NEOPLASM OF CENTRAL PORTION OF LEFT BREAST IN FEMALE, ESTROGEN RECEPTOR POSITIVE (HCC): Primary | ICD-10-CM

## 2024-03-27 DIAGNOSIS — Z17.0 MALIGNANT NEOPLASM OF CENTRAL PORTION OF LEFT BREAST IN FEMALE, ESTROGEN RECEPTOR POSITIVE (HCC): Primary | ICD-10-CM

## 2024-03-27 LAB
BASOPHILS # BLD AUTO: 0.05 X10(3) UL (ref 0–0.2)
BASOPHILS NFR BLD AUTO: 1.3 %
DEPRECATED RDW RBC AUTO: 55.1 FL (ref 35.1–46.3)
EOSINOPHIL # BLD AUTO: 0.2 X10(3) UL (ref 0–0.7)
EOSINOPHIL NFR BLD AUTO: 5 %
ERYTHROCYTE [DISTWIDTH] IN BLOOD BY AUTOMATED COUNT: 14.4 % (ref 11–15)
HCT VFR BLD AUTO: 30.1 %
HGB BLD-MCNC: 10.4 G/DL
IMM GRANULOCYTES # BLD AUTO: 0.01 X10(3) UL (ref 0–1)
IMM GRANULOCYTES NFR BLD: 0.3 %
LYMPHOCYTES # BLD AUTO: 0.81 X10(3) UL (ref 1–4)
LYMPHOCYTES NFR BLD AUTO: 20.3 %
MCH RBC QN AUTO: 36 PG (ref 26–34)
MCHC RBC AUTO-ENTMCNC: 34.6 G/DL (ref 31–37)
MCV RBC AUTO: 104.2 FL
MONOCYTES # BLD AUTO: 0.32 X10(3) UL (ref 0.1–1)
MONOCYTES NFR BLD AUTO: 8 %
NEUTROPHILS # BLD AUTO: 2.6 X10 (3) UL (ref 1.5–7.7)
NEUTROPHILS # BLD AUTO: 2.6 X10(3) UL (ref 1.5–7.7)
NEUTROPHILS NFR BLD AUTO: 65.1 %
PLATELET # BLD AUTO: 299 10(3)UL (ref 150–450)
RBC # BLD AUTO: 2.89 X10(6)UL
WBC # BLD AUTO: 4 X10(3) UL (ref 4–11)

## 2024-03-27 PROCEDURE — 96375 TX/PRO/DX INJ NEW DRUG ADDON: CPT

## 2024-03-27 PROCEDURE — 85025 COMPLETE CBC W/AUTO DIFF WBC: CPT

## 2024-03-27 PROCEDURE — 96413 CHEMO IV INFUSION 1 HR: CPT

## 2024-03-27 PROCEDURE — S0028 INJECTION, FAMOTIDINE, 20 MG: HCPCS

## 2024-03-27 RX ORDER — FAMOTIDINE 10 MG/ML
INJECTION, SOLUTION INTRAVENOUS
Status: DISPENSED
Start: 2024-03-27 | End: 2024-03-28

## 2024-03-27 RX ORDER — HEPARIN SODIUM (PORCINE) LOCK FLUSH IV SOLN 100 UNIT/ML 100 UNIT/ML
SOLUTION INTRAVENOUS
Status: DISPENSED
Start: 2024-03-27 | End: 2024-03-28

## 2024-03-27 RX ORDER — FAMOTIDINE 10 MG/ML
20 INJECTION, SOLUTION INTRAVENOUS ONCE
Status: COMPLETED | OUTPATIENT
Start: 2024-03-27 | End: 2024-03-27

## 2024-03-27 RX ORDER — DIPHENHYDRAMINE HYDROCHLORIDE 50 MG/ML
INJECTION INTRAMUSCULAR; INTRAVENOUS
Status: DISPENSED
Start: 2024-03-27 | End: 2024-03-28

## 2024-03-27 RX ORDER — DIPHENHYDRAMINE HYDROCHLORIDE 50 MG/ML
25 INJECTION INTRAMUSCULAR; INTRAVENOUS ONCE
Status: COMPLETED | OUTPATIENT
Start: 2024-03-27 | End: 2024-03-27

## 2024-03-27 RX ADMIN — DIPHENHYDRAMINE HYDROCHLORIDE 25 MG: 50 INJECTION INTRAMUSCULAR; INTRAVENOUS at 13:25:00

## 2024-03-27 RX ADMIN — FAMOTIDINE 20 MG: 10 INJECTION, SOLUTION INTRAVENOUS at 13:26:00

## 2024-03-27 NOTE — PROGRESS NOTES
Patient here for C 6 D 15 Taxol.      Arrives Ambulating independently, accompanied by Self. Labs reviewed by this RN. Patient denies new concerns or complaints.    Patient was evaluated today by Treatment Nurse.    Oral medications included in this regimen:  no    Patient confirms comprehension of cancer treatment schedule:  yes    Pregnancy screening:  Denies possibility of pregnancy    Modifications in dose or schedule:  No    Medications appearance and physical integrity checked by RN: yes.    Chemotherapy IV pump settings verified by 2 RNs:  Yes.  Frequency of blood return and site check throughout administration: Prior to administration, Prior to each drug, and At completion of therapy     Infusion/treatment outcome:  patient tolerated treatment without incident    Education Record    Learner:  Patient  Barriers / Limitations:  None  Method:  Reinforcement  Education / instructions given:  Reinforced plan of care and treatment regimen  Outcome:  Shows understanding    Discharged Home, Ambulating independently, accompanied by:Self    Patient/family verbalized understanding of future appointments: by [employer] messaging

## 2024-04-03 ENCOUNTER — OFFICE VISIT (OUTPATIENT)
Dept: HEMATOLOGY/ONCOLOGY | Facility: HOSPITAL | Age: 49
End: 2024-04-03
Attending: NURSE PRACTITIONER
Payer: COMMERCIAL

## 2024-04-03 ENCOUNTER — NURSE ONLY (OUTPATIENT)
Dept: HEMATOLOGY/ONCOLOGY | Facility: HOSPITAL | Age: 49
End: 2024-04-03
Attending: INTERNAL MEDICINE
Payer: COMMERCIAL

## 2024-04-03 VITALS
RESPIRATION RATE: 16 BRPM | DIASTOLIC BLOOD PRESSURE: 73 MMHG | HEART RATE: 82 BPM | OXYGEN SATURATION: 98 % | HEIGHT: 65 IN | TEMPERATURE: 98 F | BODY MASS INDEX: 34.32 KG/M2 | WEIGHT: 206 LBS | SYSTOLIC BLOOD PRESSURE: 123 MMHG

## 2024-04-03 DIAGNOSIS — C50.112 MALIGNANT NEOPLASM OF CENTRAL PORTION OF LEFT BREAST IN FEMALE, ESTROGEN RECEPTOR POSITIVE (HCC): Primary | ICD-10-CM

## 2024-04-03 DIAGNOSIS — Z17.0 MALIGNANT NEOPLASM OF CENTRAL PORTION OF LEFT BREAST IN FEMALE, ESTROGEN RECEPTOR POSITIVE (HCC): Primary | ICD-10-CM

## 2024-04-03 DIAGNOSIS — Z45.2 ENCOUNTER FOR CENTRAL LINE CARE: ICD-10-CM

## 2024-04-03 DIAGNOSIS — Z09 CHEMOTHERAPY FOLLOW-UP EXAMINATION: ICD-10-CM

## 2024-04-03 LAB
ALBUMIN SERPL-MCNC: 4.5 G/DL (ref 3.2–4.8)
ALBUMIN/GLOB SERPL: 1.5 {RATIO} (ref 1–2)
ALP LIVER SERPL-CCNC: 89 U/L
ALT SERPL-CCNC: 30 U/L
ANION GAP SERPL CALC-SCNC: 3 MMOL/L (ref 0–18)
AST SERPL-CCNC: 24 U/L (ref ?–34)
BASOPHILS # BLD AUTO: 0.04 X10(3) UL (ref 0–0.2)
BASOPHILS NFR BLD AUTO: 1.2 %
BILIRUB SERPL-MCNC: 0.5 MG/DL (ref 0.3–1.2)
BUN BLD-MCNC: 10 MG/DL (ref 9–23)
BUN/CREAT SERPL: 16.4 (ref 10–20)
CALCIUM BLD-MCNC: 9.8 MG/DL (ref 8.7–10.4)
CHLORIDE SERPL-SCNC: 108 MMOL/L (ref 98–112)
CO2 SERPL-SCNC: 27 MMOL/L (ref 21–32)
CREAT BLD-MCNC: 0.61 MG/DL
DEPRECATED RDW RBC AUTO: 49.6 FL (ref 35.1–46.3)
EGFRCR SERPLBLD CKD-EPI 2021: 111 ML/MIN/1.73M2 (ref 60–?)
EOSINOPHIL # BLD AUTO: 0.12 X10(3) UL (ref 0–0.7)
EOSINOPHIL NFR BLD AUTO: 3.6 %
ERYTHROCYTE [DISTWIDTH] IN BLOOD BY AUTOMATED COUNT: 12.9 % (ref 11–15)
FASTING STATUS PATIENT QL REPORTED: NO
GLOBULIN PLAS-MCNC: 3.1 G/DL (ref 2.8–4.4)
GLUCOSE BLD-MCNC: 93 MG/DL (ref 70–99)
HCT VFR BLD AUTO: 31.6 %
HGB BLD-MCNC: 10.5 G/DL
IMM GRANULOCYTES # BLD AUTO: 0.02 X10(3) UL (ref 0–1)
IMM GRANULOCYTES NFR BLD: 0.6 %
LYMPHOCYTES # BLD AUTO: 0.8 X10(3) UL (ref 1–4)
LYMPHOCYTES NFR BLD AUTO: 24.3 %
MCH RBC QN AUTO: 34.9 PG (ref 26–34)
MCHC RBC AUTO-ENTMCNC: 33.2 G/DL (ref 31–37)
MCV RBC AUTO: 105 FL
MONOCYTES # BLD AUTO: 0.29 X10(3) UL (ref 0.1–1)
MONOCYTES NFR BLD AUTO: 8.8 %
NEUTROPHILS # BLD AUTO: 2.02 X10 (3) UL (ref 1.5–7.7)
NEUTROPHILS # BLD AUTO: 2.02 X10(3) UL (ref 1.5–7.7)
NEUTROPHILS NFR BLD AUTO: 61.5 %
OSMOLALITY SERPL CALC.SUM OF ELEC: 285 MOSM/KG (ref 275–295)
PLATELET # BLD AUTO: 300 10(3)UL (ref 150–450)
POTASSIUM SERPL-SCNC: 4.1 MMOL/L (ref 3.5–5.1)
PROT SERPL-MCNC: 7.6 G/DL (ref 5.7–8.2)
RBC # BLD AUTO: 3.01 X10(6)UL
SODIUM SERPL-SCNC: 138 MMOL/L (ref 136–145)
WBC # BLD AUTO: 3.3 X10(3) UL (ref 4–11)

## 2024-04-03 PROCEDURE — 99215 OFFICE O/P EST HI 40 MIN: CPT | Performed by: NURSE PRACTITIONER

## 2024-04-03 PROCEDURE — 96367 TX/PROPH/DG ADDL SEQ IV INF: CPT

## 2024-04-03 PROCEDURE — S0028 INJECTION, FAMOTIDINE, 20 MG: HCPCS

## 2024-04-03 PROCEDURE — 85025 COMPLETE CBC W/AUTO DIFF WBC: CPT

## 2024-04-03 PROCEDURE — 96375 TX/PRO/DX INJ NEW DRUG ADDON: CPT

## 2024-04-03 PROCEDURE — 96413 CHEMO IV INFUSION 1 HR: CPT

## 2024-04-03 PROCEDURE — 80053 COMPREHEN METABOLIC PANEL: CPT

## 2024-04-03 RX ORDER — HEPARIN SODIUM (PORCINE) LOCK FLUSH IV SOLN 100 UNIT/ML 100 UNIT/ML
5 SOLUTION INTRAVENOUS ONCE
OUTPATIENT
Start: 2024-04-03

## 2024-04-03 RX ORDER — FAMOTIDINE 10 MG/ML
20 INJECTION, SOLUTION INTRAVENOUS ONCE
OUTPATIENT
Start: 2024-04-10

## 2024-04-03 RX ORDER — FAMOTIDINE 10 MG/ML
20 INJECTION, SOLUTION INTRAVENOUS ONCE
OUTPATIENT
Start: 2024-04-17

## 2024-04-03 RX ORDER — FAMOTIDINE 10 MG/ML
20 INJECTION, SOLUTION INTRAVENOUS ONCE
Status: COMPLETED | OUTPATIENT
Start: 2024-04-03 | End: 2024-04-03

## 2024-04-03 RX ORDER — SODIUM CHLORIDE 9 MG/ML
10 INJECTION, SOLUTION INTRAMUSCULAR; INTRAVENOUS; SUBCUTANEOUS ONCE
OUTPATIENT
Start: 2024-04-03

## 2024-04-03 RX ORDER — DIPHENHYDRAMINE HYDROCHLORIDE 50 MG/ML
25 INJECTION INTRAMUSCULAR; INTRAVENOUS ONCE
OUTPATIENT
Start: 2024-04-10

## 2024-04-03 RX ORDER — FAMOTIDINE 10 MG/ML
20 INJECTION, SOLUTION INTRAVENOUS ONCE
Status: CANCELLED | OUTPATIENT
Start: 2024-04-03

## 2024-04-03 RX ORDER — FAMOTIDINE 10 MG/ML
INJECTION, SOLUTION INTRAVENOUS
Status: COMPLETED
Start: 2024-04-03 | End: 2024-04-03

## 2024-04-03 RX ORDER — DIPHENHYDRAMINE HYDROCHLORIDE 50 MG/ML
25 INJECTION INTRAMUSCULAR; INTRAVENOUS ONCE
Status: COMPLETED | OUTPATIENT
Start: 2024-04-03 | End: 2024-04-03

## 2024-04-03 RX ORDER — DIPHENHYDRAMINE HYDROCHLORIDE 50 MG/ML
INJECTION INTRAMUSCULAR; INTRAVENOUS
Status: COMPLETED
Start: 2024-04-03 | End: 2024-04-03

## 2024-04-03 RX ORDER — HEPARIN SODIUM (PORCINE) LOCK FLUSH IV SOLN 100 UNIT/ML 100 UNIT/ML
5 SOLUTION INTRAVENOUS ONCE
Status: COMPLETED | OUTPATIENT
Start: 2024-04-03 | End: 2024-04-03

## 2024-04-03 RX ORDER — DIPHENHYDRAMINE HYDROCHLORIDE 50 MG/ML
25 INJECTION INTRAMUSCULAR; INTRAVENOUS ONCE
OUTPATIENT
Start: 2024-04-17

## 2024-04-03 RX ORDER — DIPHENHYDRAMINE HYDROCHLORIDE 50 MG/ML
25 INJECTION INTRAMUSCULAR; INTRAVENOUS ONCE
Status: CANCELLED | OUTPATIENT
Start: 2024-04-03

## 2024-04-03 RX ADMIN — FAMOTIDINE 20 MG: 10 INJECTION, SOLUTION INTRAVENOUS at 11:57:00

## 2024-04-03 RX ADMIN — DIPHENHYDRAMINE HYDROCHLORIDE 25 MG: 50 INJECTION INTRAMUSCULAR; INTRAVENOUS at 11:53:00

## 2024-04-03 RX ADMIN — HEPARIN SODIUM (PORCINE) LOCK FLUSH IV SOLN 100 UNIT/ML 500 UNITS: 100 SOLUTION INTRAVENOUS at 13:22:00

## 2024-04-03 NOTE — PROGRESS NOTES
HPI   Alisia Berg is a 48 year old female here at the request of NORBERTO SERNA MD for evaluation of   Encounter Diagnoses   Name Primary?    Malignant neoplasm of central portion of left breast in female, estrogen receptor positive (HCC) Yes    Chemotherapy follow-up examination        Patient here status post left-sided lumpectomy with axillary lymph node dissection on 11/6/2023.    Patient is currently s/p cycle 2 of weekly Taxol.  She has completed her 4 cycles of dose dense AC.    Fatigue:  Yes, but less.    Fevers:  No    Appetite/taste changes:  Yes, improving.    Mucositis:  Yes, improved with rinsing.  Tender lower left gum ? Tooth- feels strange. Continue rinses if fevers or increases in pain - dentist appt.      Weight changes:  No    Nausea/vomiting:  Yes, but less nausea, took meds two days.    Diarrhea:  No    Constipation:  No.  Eating prunes and improved, no issues with hemorrhoids.     Peripheral neuropathy:  Yes, finger tips, the same.    Skin: hyperpigmentation at finger nails.Thumb nails thickening     Sometimes pain at L breast surgical site. Good ROM L arm       LMP January 2024    ECOG PS  1- mild fatigue    Review of Systems:   Review of Systems   HENT:   Positive for mouth sores (L lower gum tender sl reddened).         Pain in the L ear but gone   Respiratory:  Negative for cough and shortness of breath.    Cardiovascular:  Negative for chest pain.   Gastrointestinal:  Negative for abdominal pain.   Endocrine: Positive for hot flashes.   Genitourinary:  Positive for frequency. Negative for dysuria.    Musculoskeletal:  Negative for arthralgias and back pain.   Neurological:  Negative for dizziness and headaches.   Hematological:  Negative for adenopathy. Does not bruise/bleed easily.   Psychiatric/Behavioral:  Positive for sleep disturbance.            Current Outpatient Medications   Medication Sig Dispense Refill    prochlorperazine (COMPAZINE) 10 mg tablet Take 1 tablet (10  mg total) by mouth every 8 (eight) hours as needed for Nausea. 30 tablet 3    ondansetron (ZOFRAN) 8 MG tablet Take 1 tablet (8 mg total) by mouth every 8 (eight) hours as needed for Nausea. 30 tablet 3    carBAMazepine 200 MG Oral Tab Take 1 tablet (200 mg total) by mouth 2 (two) times daily.      losartan 50 MG Oral Tab Take 1 tablet (50 mg total) by mouth daily.       Allergies:   No Known Allergies    Past Medical History:   Diagnosis Date    High blood pressure     Hypertension     Trigeminal neuralgia of left side of face      Past Surgical History:   Procedure Laterality Date    LUMPECTOMY LEFT  2023    NEEDLE BIOPSY RIGHT Right     bx done at age 20yrs old          x2     Social History     Socioeconomic History    Marital status:    Tobacco Use    Smoking status: Never    Smokeless tobacco: Never   Substance and Sexual Activity    Alcohol use: Never    Drug use: Never       Family History   Problem Relation Age of Onset    Breast Cancer Mother 34    Cancer Paternal Grandfather         unknown type    Ovarian Cancer Neg          PHYSICAL EXAM:    /73 (BP Location: Right arm, Patient Position: Sitting, Cuff Size: large)   Pulse 82   Temp 98 °F (36.7 °C) (Oral)   Resp 16   Ht 1.651 m (5' 5\")   Wt 93.4 kg (206 lb)   LMP 12/15/2023 (Approximate)   SpO2 98%   BMI 34.28 kg/m²   Wt Readings from Last 6 Encounters:   24 93.4 kg (206 lb)   24 92.8 kg (204 lb 9.6 oz)   24 92.3 kg (203 lb 6.4 oz)   24 92.4 kg (203 lb 9.6 oz)   24 92.8 kg (204 lb 8 oz)   24 93 kg (205 lb)     Physical Exam  General: Patient is alert, not in acute distress.  HEENT: EOMs intact. PERRL. Oropharynx is clear. L lower gum inflamed - monitor   Neck: No JVD. No palpable lymphadenopathy. Neck is supple.  Chest: Clear to auscultation.  Heart: Regular rate and rhythm.   Abdomen: Soft, non tender with good bowel sounds.  Extremities: No edema.  Neurological: Grossly intact.    Lymphatics: There is no palpable lymphadenopathy throughout in the cervical, supraclavicular, axillary, or inguinal regions.  Psych/Depression: nl  Skin:  hyperpigmentation of the finger nails and dorsum of hands. Thumb nails lifting. Alopecia.        ASSESSMENT/PLAN:     1. Malignant neoplasm of central portion of left breast in female, estrogen receptor positive (HCC)    2. Chemotherapy follow-up examination        1. Malignant neoplasm of central portion of left breast in female, estrogen receptor positive    Cancer Staging   Malignant neoplasm of central portion of left breast in female, estrogen receptor positive (HCC)  Staging form: Breast, AJCC 8th Edition  - Clinical stage from 9/29/2023: Stage IB (cT1c, cN1(f), cM0, G2, ER+, AK+, HER2-) - Signed by Montrell Heaton MD on 9/29/2023  - Pathologic stage from 11/15/2023: Stage IB (pT1c, pN2a, cM0, G2, ER+, AK+, HER2-) - Signed by Montrell Heaton MD on 11/15/2023    -Genetic testing was negative.    -Patient underwent a left-sided lumpectomy with sentinel lymph node which was positive, and completion axillary dissection on 11/6/2023.  She had 4 of 25 positive lymph nodes.    -Discussed with patient standard of care is to proceed with adjuvant chemotherapy within 4 to 8 weeks postoperatively.  Given her extensive beatriz involvement, recommend to proceed with adjuvant chemotherapy with dose dense Adriamycin Cytoxan followed by weekly Taxol.  Patient will have baseline cardiac function assessment with either a TTE or MUGA.  Also recommend port placement for safe administration of treatment. Briefly discussed potential SE of treatment, will discuss further on teaching session.      Completed 4 cycles of adjuvant DD AC,    S/p cycle 2 of weekly taxol    Proceed with cycle 3 of weekly taxol.(Cycle 7) decrease dexamethasone d/t insomnia    - After patient completes chemotherapy, she will proceed with radiation treatment.    - Will certainly benefit from adjuvant  endocrine therapy, based on her hormone status.  Baseline hormone levels obtained, and patient appears to be in the postmenopausal range.  We will repeat this again in 1 year after last period.  Duration of adjuvant endocrine therapy will be for at least 5 years, however this could be up to 10 years.  Determination on duration of adjuvant endocrine therapy pending on some more emerging data regarding which patient population benefits from extended endocrine therapy.  Given his beatriz status, discussed with patient that would favor up to 10 years.    No follow-ups on file.    No orders of the defined types were placed in this encounter.    MDM high risk.  Results From Past 48 Hours:  Recent Results (from the past 48 hour(s))   Comp Metabolic Panel (14)    Collection Time: 04/03/24  8:59 AM   Result Value Ref Range    Glucose 93 70 - 99 mg/dL    Sodium 138 136 - 145 mmol/L    Potassium 4.1 3.5 - 5.1 mmol/L    Chloride 108 98 - 112 mmol/L    CO2 27.0 21.0 - 32.0 mmol/L    Anion Gap 3 0 - 18 mmol/L    BUN 10 9 - 23 mg/dL    Creatinine 0.61 0.55 - 1.02 mg/dL    BUN/CREA Ratio 16.4 10.0 - 20.0    Calcium, Total 9.8 8.7 - 10.4 mg/dL    Calculated Osmolality 285 275 - 295 mOsm/kg    eGFR-Cr 111 >=60 mL/min/1.73m2    ALT 30 10 - 49 U/L    AST 24 <=34 U/L    Alkaline Phosphatase 89 39 - 100 U/L    Bilirubin, Total 0.5 0.3 - 1.2 mg/dL    Total Protein 7.6 5.7 - 8.2 g/dL    Albumin 4.5 3.2 - 4.8 g/dL    Globulin  3.1 2.8 - 4.4 g/dL    A/G Ratio 1.5 1.0 - 2.0    Patient Fasting for CMP? No    CBC W/ DIFFERENTIAL    Collection Time: 04/03/24  8:59 AM   Result Value Ref Range    WBC 3.3 (L) 4.0 - 11.0 x10(3) uL    RBC 3.01 (L) 3.80 - 5.30 x10(6)uL    HGB 10.5 (L) 12.0 - 16.0 g/dL    HCT 31.6 (L) 35.0 - 48.0 %    .0 (H) 80.0 - 100.0 fL    MCH 34.9 (H) 26.0 - 34.0 pg    MCHC 33.2 31.0 - 37.0 g/dL    RDW-SD 49.6 (H) 35.1 - 46.3 fL    RDW 12.9 11.0 - 15.0 %    .0 150.0 - 450.0 10(3)uL    Neutrophil Absolute Prelim 2.02  1.50 - 7.70 x10 (3) uL    Neutrophil Absolute 2.02 1.50 - 7.70 x10(3) uL    Lymphocyte Absolute 0.80 (L) 1.00 - 4.00 x10(3) uL    Monocyte Absolute 0.29 0.10 - 1.00 x10(3) uL    Eosinophil Absolute 0.12 0.00 - 0.70 x10(3) uL    Basophil Absolute 0.04 0.00 - 0.20 x10(3) uL    Immature Granulocyte Absolute 0.02 0.00 - 1.00 x10(3) uL    Neutrophil % 61.5 %    Lymphocyte % 24.3 %    Monocyte % 8.8 %    Eosinophil % 3.6 %    Basophil % 1.2 %    Immature Granulocyte % 0.6 %         Imaging & Referrals:  None

## 2024-04-03 NOTE — PROGRESS NOTES
Patient here for C7D1 Taxol.  Arrives Ambulating independently, accompanied by Self. Labs reviewed by this RN, patient denies new issues or complaints today.    Patient was evaluated today by ARCHIE and Treatment Nurse.    Oral medications included in this regimen:  no    Patient confirms comprehension of cancer treatment schedule:  yes    Pregnancy screening:  Denies possibility of pregnancy    Modifications in dose or schedule:  No    Medications appearance and physical integrity checked by RN: yes.    Chemotherapy IV pump settings verified by 2 RNs:  Yes.  Frequency of blood return and site check throughout administration: Prior to administration and At completion of therapy     Infusion/treatment outcome:  patient tolerated treatment without incident    Education Record    Learner:  Patient  Barriers / Limitations:  None  Method:  Reinforcement  Education / instructions given:  Plan of care and treatment regimen  Outcome:  Shows understanding    Discharged Home, Ambulating independently, accompanied by:Self    Patient/family verbalized understanding of future appointments: by My[employer] messaging

## 2024-04-10 ENCOUNTER — OFFICE VISIT (OUTPATIENT)
Dept: HEMATOLOGY/ONCOLOGY | Facility: HOSPITAL | Age: 49
End: 2024-04-10
Attending: INTERNAL MEDICINE
Payer: COMMERCIAL

## 2024-04-10 VITALS
SYSTOLIC BLOOD PRESSURE: 113 MMHG | WEIGHT: 205 LBS | HEART RATE: 79 BPM | OXYGEN SATURATION: 97 % | RESPIRATION RATE: 16 BRPM | TEMPERATURE: 98 F | BODY MASS INDEX: 34 KG/M2 | DIASTOLIC BLOOD PRESSURE: 63 MMHG

## 2024-04-10 DIAGNOSIS — Z17.0 MALIGNANT NEOPLASM OF CENTRAL PORTION OF LEFT BREAST IN FEMALE, ESTROGEN RECEPTOR POSITIVE (HCC): Primary | ICD-10-CM

## 2024-04-10 DIAGNOSIS — Z45.2 ENCOUNTER FOR CENTRAL LINE CARE: ICD-10-CM

## 2024-04-10 DIAGNOSIS — C50.112 MALIGNANT NEOPLASM OF CENTRAL PORTION OF LEFT BREAST IN FEMALE, ESTROGEN RECEPTOR POSITIVE (HCC): Primary | ICD-10-CM

## 2024-04-10 LAB
BASOPHILS # BLD AUTO: 0.03 X10(3) UL (ref 0–0.2)
BASOPHILS NFR BLD AUTO: 1.1 %
DEPRECATED RDW RBC AUTO: 45.9 FL (ref 35.1–46.3)
EOSINOPHIL # BLD AUTO: 0.11 X10(3) UL (ref 0–0.7)
EOSINOPHIL NFR BLD AUTO: 3.9 %
ERYTHROCYTE [DISTWIDTH] IN BLOOD BY AUTOMATED COUNT: 12.1 % (ref 11–15)
HCT VFR BLD AUTO: 30.6 %
HGB BLD-MCNC: 10.3 G/DL
IMM GRANULOCYTES # BLD AUTO: 0.01 X10(3) UL (ref 0–1)
IMM GRANULOCYTES NFR BLD: 0.4 %
LYMPHOCYTES # BLD AUTO: 0.76 X10(3) UL (ref 1–4)
LYMPHOCYTES NFR BLD AUTO: 26.9 %
MCH RBC QN AUTO: 34.8 PG (ref 26–34)
MCHC RBC AUTO-ENTMCNC: 33.7 G/DL (ref 31–37)
MCV RBC AUTO: 103.4 FL
MONOCYTES # BLD AUTO: 0.3 X10(3) UL (ref 0.1–1)
MONOCYTES NFR BLD AUTO: 10.6 %
NEUTROPHILS # BLD AUTO: 1.62 X10 (3) UL (ref 1.5–7.7)
NEUTROPHILS # BLD AUTO: 1.62 X10(3) UL (ref 1.5–7.7)
NEUTROPHILS NFR BLD AUTO: 57.1 %
PLATELET # BLD AUTO: 293 10(3)UL (ref 150–450)
RBC # BLD AUTO: 2.96 X10(6)UL
WBC # BLD AUTO: 2.8 X10(3) UL (ref 4–11)

## 2024-04-10 PROCEDURE — 96375 TX/PRO/DX INJ NEW DRUG ADDON: CPT

## 2024-04-10 PROCEDURE — S0028 INJECTION, FAMOTIDINE, 20 MG: HCPCS

## 2024-04-10 PROCEDURE — 96413 CHEMO IV INFUSION 1 HR: CPT

## 2024-04-10 PROCEDURE — 85025 COMPLETE CBC W/AUTO DIFF WBC: CPT

## 2024-04-10 RX ORDER — FAMOTIDINE 10 MG/ML
20 INJECTION, SOLUTION INTRAVENOUS ONCE
Status: COMPLETED | OUTPATIENT
Start: 2024-04-10 | End: 2024-04-10

## 2024-04-10 RX ORDER — HEPARIN SODIUM (PORCINE) LOCK FLUSH IV SOLN 100 UNIT/ML 100 UNIT/ML
SOLUTION INTRAVENOUS
Status: COMPLETED
Start: 2024-04-10 | End: 2024-04-10

## 2024-04-10 RX ORDER — DIPHENHYDRAMINE HYDROCHLORIDE 50 MG/ML
25 INJECTION INTRAMUSCULAR; INTRAVENOUS ONCE
Status: COMPLETED | OUTPATIENT
Start: 2024-04-10 | End: 2024-04-10

## 2024-04-10 RX ORDER — DIPHENHYDRAMINE HYDROCHLORIDE 50 MG/ML
INJECTION INTRAMUSCULAR; INTRAVENOUS
Status: COMPLETED
Start: 2024-04-10 | End: 2024-04-10

## 2024-04-10 RX ORDER — SODIUM CHLORIDE 9 MG/ML
10 INJECTION, SOLUTION INTRAMUSCULAR; INTRAVENOUS; SUBCUTANEOUS ONCE
OUTPATIENT
Start: 2024-04-10

## 2024-04-10 RX ORDER — HEPARIN SODIUM (PORCINE) LOCK FLUSH IV SOLN 100 UNIT/ML 100 UNIT/ML
5 SOLUTION INTRAVENOUS ONCE
OUTPATIENT
Start: 2024-04-10

## 2024-04-10 RX ORDER — FAMOTIDINE 10 MG/ML
INJECTION, SOLUTION INTRAVENOUS
Status: COMPLETED
Start: 2024-04-10 | End: 2024-04-10

## 2024-04-10 RX ORDER — HEPARIN SODIUM (PORCINE) LOCK FLUSH IV SOLN 100 UNIT/ML 100 UNIT/ML
5 SOLUTION INTRAVENOUS ONCE
Status: COMPLETED | OUTPATIENT
Start: 2024-04-10 | End: 2024-04-10

## 2024-04-10 RX ADMIN — DIPHENHYDRAMINE HYDROCHLORIDE 25 MG: 50 INJECTION INTRAMUSCULAR; INTRAVENOUS at 12:07:00

## 2024-04-10 RX ADMIN — HEPARIN SODIUM (PORCINE) LOCK FLUSH IV SOLN 100 UNIT/ML 500 UNITS: 100 SOLUTION INTRAVENOUS at 13:40:00

## 2024-04-10 RX ADMIN — FAMOTIDINE 20 MG: 10 INJECTION, SOLUTION INTRAVENOUS at 12:03:00

## 2024-04-10 NOTE — PROGRESS NOTES
Pt here for C7D8 Drug(s)Taxol.  Arrives Ambulating independently, accompanied by Self     Patient was evaluated today by Treatment Nurse.    Oral medications included in this regimen:  no    Patient confirms comprehension of cancer treatment schedule:  yes    Pregnancy screening:  Denies possibility of pregnancy    Modifications in dose or schedule:  No    Medications appearance and physical integrity checked by RN: yes.    Chemotherapy IV pump settings verified by 2 RNs:  Yes.  Frequency of blood return and site check throughout administration: Prior to administration and At completion of therapy     Infusion/treatment outcome:  patient tolerated treatment without incident    Education Record    Learner:  Patient  Barriers / Limitations:  None  Method:  Discussion  Education / instructions given:  Plan of care  Outcome:  Shows understanding    Discharged Home, Ambulating independently, accompanied by:Self    Patient/family verbalized understanding of future appointments: by [employer] messaging

## 2024-04-16 ENCOUNTER — LAB REQUISITION (OUTPATIENT)
Dept: LAB | Facility: HOSPITAL | Age: 49
End: 2024-04-16
Payer: COMMERCIAL

## 2024-04-16 DIAGNOSIS — S10.86XA INSECT BITE OF OTHER SPECIFIED PART OF NECK, INITIAL ENCOUNTER (CODE): ICD-10-CM

## 2024-04-16 PROCEDURE — 88304 TISSUE EXAM BY PATHOLOGIST: CPT | Performed by: NURSE PRACTITIONER

## 2024-04-17 ENCOUNTER — NURSE ONLY (OUTPATIENT)
Dept: HEMATOLOGY/ONCOLOGY | Facility: HOSPITAL | Age: 49
End: 2024-04-17
Attending: INTERNAL MEDICINE
Payer: COMMERCIAL

## 2024-04-17 DIAGNOSIS — Z45.2 ENCOUNTER FOR CENTRAL LINE CARE: ICD-10-CM

## 2024-04-17 DIAGNOSIS — Z17.0 MALIGNANT NEOPLASM OF CENTRAL PORTION OF LEFT BREAST IN FEMALE, ESTROGEN RECEPTOR POSITIVE (HCC): Primary | ICD-10-CM

## 2024-04-17 DIAGNOSIS — C50.112 MALIGNANT NEOPLASM OF CENTRAL PORTION OF LEFT BREAST IN FEMALE, ESTROGEN RECEPTOR POSITIVE (HCC): Primary | ICD-10-CM

## 2024-04-17 LAB
BASOPHILS # BLD AUTO: 0.03 X10(3) UL (ref 0–0.2)
BASOPHILS NFR BLD AUTO: 1 %
DEPRECATED RDW RBC AUTO: 45.5 FL (ref 35.1–46.3)
EOSINOPHIL # BLD AUTO: 0.11 X10(3) UL (ref 0–0.7)
EOSINOPHIL NFR BLD AUTO: 3.8 %
ERYTHROCYTE [DISTWIDTH] IN BLOOD BY AUTOMATED COUNT: 11.8 % (ref 11–15)
HCT VFR BLD AUTO: 32.7 %
HGB BLD-MCNC: 10.7 G/DL
IMM GRANULOCYTES # BLD AUTO: 0 X10(3) UL (ref 0–1)
IMM GRANULOCYTES NFR BLD: 0 %
LYMPHOCYTES # BLD AUTO: 0.76 X10(3) UL (ref 1–4)
LYMPHOCYTES NFR BLD AUTO: 26.6 %
MCH RBC QN AUTO: 34.4 PG (ref 26–34)
MCHC RBC AUTO-ENTMCNC: 32.7 G/DL (ref 31–37)
MCV RBC AUTO: 105.1 FL
MONOCYTES # BLD AUTO: 0.24 X10(3) UL (ref 0.1–1)
MONOCYTES NFR BLD AUTO: 8.4 %
NEUTROPHILS # BLD AUTO: 1.72 X10 (3) UL (ref 1.5–7.7)
NEUTROPHILS # BLD AUTO: 1.72 X10(3) UL (ref 1.5–7.7)
NEUTROPHILS NFR BLD AUTO: 60.2 %
PLATELET # BLD AUTO: 291 10(3)UL (ref 150–450)
RBC # BLD AUTO: 3.11 X10(6)UL
WBC # BLD AUTO: 2.9 X10(3) UL (ref 4–11)

## 2024-04-17 PROCEDURE — 85025 COMPLETE CBC W/AUTO DIFF WBC: CPT

## 2024-04-17 PROCEDURE — 96375 TX/PRO/DX INJ NEW DRUG ADDON: CPT

## 2024-04-17 PROCEDURE — S0028 INJECTION, FAMOTIDINE, 20 MG: HCPCS

## 2024-04-17 PROCEDURE — 96413 CHEMO IV INFUSION 1 HR: CPT

## 2024-04-17 PROCEDURE — 96367 TX/PROPH/DG ADDL SEQ IV INF: CPT

## 2024-04-17 RX ORDER — HEPARIN SODIUM (PORCINE) LOCK FLUSH IV SOLN 100 UNIT/ML 100 UNIT/ML
5 SOLUTION INTRAVENOUS ONCE
Status: COMPLETED | OUTPATIENT
Start: 2024-04-17 | End: 2024-04-17

## 2024-04-17 RX ORDER — DIPHENHYDRAMINE HYDROCHLORIDE 50 MG/ML
INJECTION INTRAMUSCULAR; INTRAVENOUS
Status: DISPENSED
Start: 2024-04-17 | End: 2024-04-18

## 2024-04-17 RX ORDER — HEPARIN SODIUM (PORCINE) LOCK FLUSH IV SOLN 100 UNIT/ML 100 UNIT/ML
5 SOLUTION INTRAVENOUS ONCE
OUTPATIENT
Start: 2024-04-17

## 2024-04-17 RX ORDER — FAMOTIDINE 10 MG/ML
20 INJECTION, SOLUTION INTRAVENOUS ONCE
Status: COMPLETED | OUTPATIENT
Start: 2024-04-17 | End: 2024-04-17

## 2024-04-17 RX ORDER — SODIUM CHLORIDE 9 MG/ML
10 INJECTION, SOLUTION INTRAMUSCULAR; INTRAVENOUS; SUBCUTANEOUS ONCE
OUTPATIENT
Start: 2024-04-17

## 2024-04-17 RX ORDER — HEPARIN SODIUM (PORCINE) LOCK FLUSH IV SOLN 100 UNIT/ML 100 UNIT/ML
SOLUTION INTRAVENOUS
Status: DISPENSED
Start: 2024-04-17 | End: 2024-04-18

## 2024-04-17 RX ORDER — FAMOTIDINE 10 MG/ML
INJECTION, SOLUTION INTRAVENOUS
Status: DISPENSED
Start: 2024-04-17 | End: 2024-04-18

## 2024-04-17 RX ORDER — DIPHENHYDRAMINE HYDROCHLORIDE 50 MG/ML
25 INJECTION INTRAMUSCULAR; INTRAVENOUS ONCE
Status: COMPLETED | OUTPATIENT
Start: 2024-04-17 | End: 2024-04-17

## 2024-04-17 RX ADMIN — DIPHENHYDRAMINE HYDROCHLORIDE 25 MG: 50 INJECTION INTRAMUSCULAR; INTRAVENOUS at 12:38:00

## 2024-04-17 RX ADMIN — FAMOTIDINE 20 MG: 10 INJECTION, SOLUTION INTRAVENOUS at 12:38:00

## 2024-04-17 RX ADMIN — HEPARIN SODIUM (PORCINE) LOCK FLUSH IV SOLN 100 UNIT/ML 500 UNITS: 100 SOLUTION INTRAVENOUS at 13:30:00

## 2024-04-17 NOTE — PROGRESS NOTES
Pt here for C7D15 Drug(s)Taxol.  Arrives Ambulating independently, accompanied by Self     Patient was evaluated today by Treatment Nurse.    Patient with bitten by a tick this week. Tick removed and tested by PCP. Patient prescribed and completed course of po doxycycline. Dr. Heaton aware.     Oral medications included in this regimen:  no    Patient confirms comprehension of cancer treatment schedule:  yes    Pregnancy screening:  Denies possibility of pregnancy    Modifications in dose or schedule:  No    Medications appearance and physical integrity checked by RN: yes.    Chemotherapy IV pump settings verified by 2 RNs:  Yes.  Frequency of blood return and site check throughout administration: Prior to administration and At completion of therapy     Infusion/treatment outcome:  patient tolerated treatment without incident    Education Record    Learner:  Patient  Barriers / Limitations:  None  Method:  Discussion  Education / instructions given:  Plan of care  Outcome:  Shows understanding    Discharged Home, Ambulating independently, accompanied by:Self    Patient/family verbalized understanding of future appointments: by [employer] messaging

## 2024-04-24 ENCOUNTER — NURSE ONLY (OUTPATIENT)
Dept: HEMATOLOGY/ONCOLOGY | Facility: HOSPITAL | Age: 49
End: 2024-04-24
Attending: INTERNAL MEDICINE
Payer: COMMERCIAL

## 2024-04-24 VITALS
HEART RATE: 80 BPM | BODY MASS INDEX: 34.09 KG/M2 | SYSTOLIC BLOOD PRESSURE: 112 MMHG | OXYGEN SATURATION: 98 % | WEIGHT: 204.63 LBS | RESPIRATION RATE: 16 BRPM | DIASTOLIC BLOOD PRESSURE: 66 MMHG | TEMPERATURE: 99 F | HEIGHT: 65 IN

## 2024-04-24 DIAGNOSIS — C50.112 MALIGNANT NEOPLASM OF CENTRAL PORTION OF LEFT BREAST IN FEMALE, ESTROGEN RECEPTOR POSITIVE (HCC): Primary | ICD-10-CM

## 2024-04-24 DIAGNOSIS — Z09 CHEMOTHERAPY FOLLOW-UP EXAMINATION: ICD-10-CM

## 2024-04-24 DIAGNOSIS — Z17.0 MALIGNANT NEOPLASM OF CENTRAL PORTION OF LEFT BREAST IN FEMALE, ESTROGEN RECEPTOR POSITIVE (HCC): Primary | ICD-10-CM

## 2024-04-24 DIAGNOSIS — Z45.2 ENCOUNTER FOR CENTRAL LINE CARE: ICD-10-CM

## 2024-04-24 LAB
ALBUMIN SERPL-MCNC: 4.5 G/DL (ref 3.2–4.8)
ALBUMIN/GLOB SERPL: 1.5 {RATIO} (ref 1–2)
ALP LIVER SERPL-CCNC: 79 U/L
ALT SERPL-CCNC: 40 U/L
ANION GAP SERPL CALC-SCNC: 4 MMOL/L (ref 0–18)
AST SERPL-CCNC: 39 U/L (ref ?–34)
BASOPHILS # BLD AUTO: 0.02 X10(3) UL (ref 0–0.2)
BASOPHILS NFR BLD AUTO: 0.6 %
BILIRUB SERPL-MCNC: 0.3 MG/DL (ref 0.3–1.2)
BUN BLD-MCNC: 9 MG/DL (ref 9–23)
BUN/CREAT SERPL: 14.5 (ref 10–20)
CALCIUM BLD-MCNC: 9.8 MG/DL (ref 8.7–10.4)
CHLORIDE SERPL-SCNC: 108 MMOL/L (ref 98–112)
CO2 SERPL-SCNC: 28 MMOL/L (ref 21–32)
CREAT BLD-MCNC: 0.62 MG/DL
DEPRECATED RDW RBC AUTO: 43.7 FL (ref 35.1–46.3)
EGFRCR SERPLBLD CKD-EPI 2021: 110 ML/MIN/1.73M2 (ref 60–?)
EOSINOPHIL # BLD AUTO: 0.1 X10(3) UL (ref 0–0.7)
EOSINOPHIL NFR BLD AUTO: 3.1 %
ERYTHROCYTE [DISTWIDTH] IN BLOOD BY AUTOMATED COUNT: 11.5 % (ref 11–15)
FASTING STATUS PATIENT QL REPORTED: NO
GLOBULIN PLAS-MCNC: 3 G/DL (ref 2.8–4.4)
GLUCOSE BLD-MCNC: 108 MG/DL (ref 70–99)
HCT VFR BLD AUTO: 33.4 %
HGB BLD-MCNC: 11.5 G/DL
IMM GRANULOCYTES # BLD AUTO: 0.01 X10(3) UL (ref 0–1)
IMM GRANULOCYTES NFR BLD: 0.3 %
LYMPHOCYTES # BLD AUTO: 0.74 X10(3) UL (ref 1–4)
LYMPHOCYTES NFR BLD AUTO: 23.3 %
MCH RBC QN AUTO: 35.6 PG (ref 26–34)
MCHC RBC AUTO-ENTMCNC: 34.4 G/DL (ref 31–37)
MCV RBC AUTO: 103.4 FL
MONOCYTES # BLD AUTO: 0.38 X10(3) UL (ref 0.1–1)
MONOCYTES NFR BLD AUTO: 11.9 %
NEUTROPHILS # BLD AUTO: 1.93 X10 (3) UL (ref 1.5–7.7)
NEUTROPHILS # BLD AUTO: 1.93 X10(3) UL (ref 1.5–7.7)
NEUTROPHILS NFR BLD AUTO: 60.8 %
OSMOLALITY SERPL CALC.SUM OF ELEC: 289 MOSM/KG (ref 275–295)
PLATELET # BLD AUTO: 269 10(3)UL (ref 150–450)
POTASSIUM SERPL-SCNC: 3.8 MMOL/L (ref 3.5–5.1)
PROT SERPL-MCNC: 7.5 G/DL (ref 5.7–8.2)
RBC # BLD AUTO: 3.23 X10(6)UL
SODIUM SERPL-SCNC: 140 MMOL/L (ref 136–145)
WBC # BLD AUTO: 3.2 X10(3) UL (ref 4–11)

## 2024-04-24 PROCEDURE — S0028 INJECTION, FAMOTIDINE, 20 MG: HCPCS

## 2024-04-24 PROCEDURE — 96413 CHEMO IV INFUSION 1 HR: CPT

## 2024-04-24 PROCEDURE — 99215 OFFICE O/P EST HI 40 MIN: CPT | Performed by: INTERNAL MEDICINE

## 2024-04-24 PROCEDURE — 96375 TX/PRO/DX INJ NEW DRUG ADDON: CPT

## 2024-04-24 PROCEDURE — 85025 COMPLETE CBC W/AUTO DIFF WBC: CPT

## 2024-04-24 PROCEDURE — 96367 TX/PROPH/DG ADDL SEQ IV INF: CPT

## 2024-04-24 PROCEDURE — 80053 COMPREHEN METABOLIC PANEL: CPT

## 2024-04-24 RX ORDER — DIPHENHYDRAMINE HYDROCHLORIDE 50 MG/ML
25 INJECTION INTRAMUSCULAR; INTRAVENOUS ONCE
OUTPATIENT
Start: 2024-05-01

## 2024-04-24 RX ORDER — DIPHENHYDRAMINE HYDROCHLORIDE 50 MG/ML
25 INJECTION INTRAMUSCULAR; INTRAVENOUS ONCE
Status: COMPLETED | OUTPATIENT
Start: 2024-04-24 | End: 2024-04-24

## 2024-04-24 RX ORDER — HEPARIN SODIUM (PORCINE) LOCK FLUSH IV SOLN 100 UNIT/ML 100 UNIT/ML
5 SOLUTION INTRAVENOUS ONCE
Status: COMPLETED | OUTPATIENT
Start: 2024-04-24 | End: 2024-04-24

## 2024-04-24 RX ORDER — FAMOTIDINE 10 MG/ML
20 INJECTION, SOLUTION INTRAVENOUS ONCE
Status: COMPLETED | OUTPATIENT
Start: 2024-04-24 | End: 2024-04-24

## 2024-04-24 RX ORDER — DIPHENHYDRAMINE HYDROCHLORIDE 50 MG/ML
INJECTION INTRAMUSCULAR; INTRAVENOUS
Status: COMPLETED
Start: 2024-04-24 | End: 2024-04-24

## 2024-04-24 RX ORDER — FAMOTIDINE 10 MG/ML
20 INJECTION, SOLUTION INTRAVENOUS ONCE
OUTPATIENT
Start: 2024-05-08

## 2024-04-24 RX ORDER — FAMOTIDINE 10 MG/ML
20 INJECTION, SOLUTION INTRAVENOUS ONCE
Status: CANCELLED | OUTPATIENT
Start: 2024-04-24

## 2024-04-24 RX ORDER — HEPARIN SODIUM (PORCINE) LOCK FLUSH IV SOLN 100 UNIT/ML 100 UNIT/ML
5 SOLUTION INTRAVENOUS ONCE
OUTPATIENT
Start: 2024-04-24

## 2024-04-24 RX ORDER — FAMOTIDINE 10 MG/ML
20 INJECTION, SOLUTION INTRAVENOUS ONCE
OUTPATIENT
Start: 2024-05-01

## 2024-04-24 RX ORDER — DIPHENHYDRAMINE HYDROCHLORIDE 50 MG/ML
25 INJECTION INTRAMUSCULAR; INTRAVENOUS ONCE
Status: CANCELLED | OUTPATIENT
Start: 2024-04-24

## 2024-04-24 RX ORDER — HEPARIN SODIUM (PORCINE) LOCK FLUSH IV SOLN 100 UNIT/ML 100 UNIT/ML
SOLUTION INTRAVENOUS
Status: COMPLETED
Start: 2024-04-24 | End: 2024-04-24

## 2024-04-24 RX ORDER — DIPHENHYDRAMINE HYDROCHLORIDE 50 MG/ML
25 INJECTION INTRAMUSCULAR; INTRAVENOUS ONCE
OUTPATIENT
Start: 2024-05-08

## 2024-04-24 RX ORDER — FAMOTIDINE 10 MG/ML
INJECTION, SOLUTION INTRAVENOUS
Status: COMPLETED
Start: 2024-04-24 | End: 2024-04-24

## 2024-04-24 RX ORDER — SODIUM CHLORIDE 9 MG/ML
10 INJECTION, SOLUTION INTRAMUSCULAR; INTRAVENOUS; SUBCUTANEOUS ONCE
OUTPATIENT
Start: 2024-04-24

## 2024-04-24 RX ADMIN — FAMOTIDINE 20 MG: 10 INJECTION, SOLUTION INTRAVENOUS at 14:05:00

## 2024-04-24 RX ADMIN — HEPARIN SODIUM (PORCINE) LOCK FLUSH IV SOLN 100 UNIT/ML 500 UNITS: 100 SOLUTION INTRAVENOUS at 15:46:00

## 2024-04-24 RX ADMIN — DIPHENHYDRAMINE HYDROCHLORIDE 25 MG: 50 INJECTION INTRAMUSCULAR; INTRAVENOUS at 14:03:00

## 2024-04-24 NOTE — PROGRESS NOTES
Patient here for C8D1 Taxol.  Arrives Ambulating independently, accompanied by Self. Denies new issues or complaints today.     Patient was evaluated today by MD and Treatment Nurse.    Oral medications included in this regimen:  no    Patient confirms comprehension of cancer treatment schedule:  yes    Pregnancy screening:  Denies possibility of pregnancy    Modifications in dose or schedule:  No    Medications appearance and physical integrity checked by RN: yes.    Chemotherapy IV pump settings verified by 2 RNs:  Yes.  Frequency of blood return and site check throughout administration: Prior to administration and At completion of therapy     Infusion/treatment outcome:  patient tolerated treatment without incident    Education Record    Learner:  Patient  Barriers / Limitations:  None  Method:  Reinforcement  Education / instructions given:  Plan of care  Outcome:  Shows understanding    Discharged Home, Ambulating independently, accompanied by:Self    Patient/family verbalized understanding of future appointments: by [employer] messaging

## 2024-04-24 NOTE — PROGRESS NOTES
HPI   Alisia Berg is a 48 year old female here at the request of NORBERTO SERNA MD for evaluation of   Encounter Diagnoses   Name Primary?    Malignant neoplasm of central portion of left breast in female, estrogen receptor positive (HCC) Yes    Chemotherapy follow-up examination        Patient here status post left-sided lumpectomy with axillary lymph node dissection on 11/6/2023.    Patient is currently s/p cycle 3 of weekly Taxol.  She has completed her 4 cycles of dose dense AC.    Fatigue:  Yes, but less.    Fevers:  No    Appetite/taste changes:  Yes, improving.    Mucositis:  Yes, improved with rinsing.  Tender lower left gum, at the bicuspids has area that is like a white pustule. Continues with rinses.  Will have dental appointment after treatment    Weight changes:  No    Nausea/vomiting:  Yes, but less nausea, took meds two days.    Diarrhea:  No    Constipation:  No.  Eating prunes and improved, no issues with hemorrhoids.     Peripheral neuropathy:  Yes, finger and toe tips, the same.    Skin: hyperpigmentation at finger nails.Thumb nails thickening     Sometimes pain at L breast surgical site. Good ROM L arm     Had a tick bite on the R chest close to the port.  Treated with doxycycline.     LMP January 2024    ECOG PS  1- mild fatigue    Review of Systems:   Review of Systems   HENT:           Pain in the L ear but gone   Respiratory:  Negative for cough and shortness of breath.    Cardiovascular:  Negative for chest pain.   Gastrointestinal:  Negative for abdominal pain.   Endocrine: Positive for hot flashes.   Genitourinary:  Negative for dysuria and frequency.    Musculoskeletal:  Positive for arthralgias (hip and ankle if walks too much) and neck pain (On the L side of neck for the last week, thinks from pillow.  Now resolved.). Negative for back pain.   Neurological:  Positive for headaches (2x this week, resolved with tylenol). Negative for dizziness.   Hematological:  Negative for  adenopathy. Does not bruise/bleed easily.   Psychiatric/Behavioral:  Positive for sleep disturbance.            Current Outpatient Medications   Medication Sig Dispense Refill    prochlorperazine (COMPAZINE) 10 mg tablet Take 1 tablet (10 mg total) by mouth every 8 (eight) hours as needed for Nausea. 30 tablet 3    ondansetron (ZOFRAN) 8 MG tablet Take 1 tablet (8 mg total) by mouth every 8 (eight) hours as needed for Nausea. 30 tablet 3    carBAMazepine 200 MG Oral Tab Take 1 tablet (200 mg total) by mouth 2 (two) times daily.      losartan 50 MG Oral Tab Take 1 tablet (50 mg total) by mouth daily.       Allergies:   No Known Allergies    Past Medical History:    High blood pressure    Hypertension    Trigeminal neuralgia of left side of face     Past Surgical History:   Procedure Laterality Date    Lumpectomy left  2023    Needle biopsy right Right     bx done at age 20yrs old          x2     Social History     Socioeconomic History    Marital status:    Tobacco Use    Smoking status: Never    Smokeless tobacco: Never   Substance and Sexual Activity    Alcohol use: Never    Drug use: Never       Family History   Problem Relation Age of Onset    Breast Cancer Mother 34    Cancer Paternal Grandfather         unknown type    Ovarian Cancer Neg          PHYSICAL EXAM:    /66 (BP Location: Right arm, Patient Position: Sitting, Cuff Size: large)   Pulse 80   Temp 98.6 °F (37 °C) (Oral)   Resp 16   Ht 1.651 m (5' 5\")   Wt 92.8 kg (204 lb 9.6 oz)   LMP 12/15/2023 (Approximate)   SpO2 98%   BMI 34.05 kg/m²   Wt Readings from Last 6 Encounters:   24 92.8 kg (204 lb 9.6 oz)   04/10/24 93 kg (205 lb)   24 93.4 kg (206 lb)   24 92.8 kg (204 lb 9.6 oz)   24 92.3 kg (203 lb 6.4 oz)   24 92.4 kg (203 lb 9.6 oz)     Physical Exam  General: Patient is alert, not in acute distress.  HEENT: EOMs intact. PERRL. Oropharynx is clear. L lower gum inflamed with whitish  pustule.  Neck: No JVD. No palpable lymphadenopathy. Neck is supple.  Chest: Clear to auscultation.  Heart: Regular rate and rhythm.   Abdomen: Soft, non tender with good bowel sounds.  Extremities: No edema.  Neurological: Grossly intact.   Lymphatics: There is no palpable lymphadenopathy throughout in the cervical, supraclavicular, axillary, or inguinal regions.  Psych/Depression: nl  Skin:  hyperpigmentation of the finger nails and dorsum of hands. Thumb nails lifting. Alopecia.        ASSESSMENT/PLAN:     1. Malignant neoplasm of central portion of left breast in female, estrogen receptor positive (HCC)    2. Chemotherapy follow-up examination        1. Malignant neoplasm of central portion of left breast in female, estrogen receptor positive    Cancer Staging   Malignant neoplasm of central portion of left breast in female, estrogen receptor positive (HCC)  Staging form: Breast, AJCC 8th Edition  - Clinical stage from 9/29/2023: Stage IB (cT1c, cN1(f), cM0, G2, ER+, WI+, HER2-) - Signed by Montrell Heaton MD on 9/29/2023  - Pathologic stage from 11/15/2023: Stage IB (pT1c, pN2a, cM0, G2, ER+, WI+, HER2-) - Signed by Montrell Heaton MD on 11/15/2023    -Genetic testing was negative.    -Patient underwent a left-sided lumpectomy with sentinel lymph node which was positive, and completion axillary dissection on 11/6/2023.  She had 4 of 25 positive lymph nodes.    -Discussed with patient standard of care is to proceed with adjuvant chemotherapy within 4 to 8 weeks postoperatively.  Given her extensive beatriz involvement, recommend to proceed with adjuvant chemotherapy with dose dense Adriamycin Cytoxan followed by weekly Taxol.  Patient will have baseline cardiac function assessment with either a TTE or MUGA.  Also recommend port placement for safe administration of treatment. Briefly discussed potential SE of treatment, will discuss further on teaching session.      Completed 4 cycles of adjuvant DD AC,    S/p  cycle 3 of weekly taxol    Proceed with cycle 4 of weekly taxol.(Cycle 7) decrease dexamethasone d/t insomnia.    Will need dental extraction after treatment, will have port removed after treatment to decrease risk of infections.    - After patient completes chemotherapy, she will proceed with radiation treatment.  RT referral placed.     - Will certainly benefit from adjuvant endocrine therapy, based on her hormone status.  Baseline hormone levels obtained, and patient appears to be in the postmenopausal range.  We will repeat this again in 1 year after last period.  Duration of adjuvant endocrine therapy will be for at least 5 years, however this could be up to 10 years.  Determination on duration of adjuvant endocrine therapy pending on some more emerging data regarding which patient population benefits from extended endocrine therapy.  Given his beatriz status, discussed with patient that would favor up to 10 years.    No follow-ups on file.    No orders of the defined types were placed in this encounter.    MDM high risk.  Results From Past 48 Hours:  Recent Results (from the past 48 hour(s))   Comp Metabolic Panel (14)    Collection Time: 04/24/24 11:40 AM   Result Value Ref Range    Glucose 108 (H) 70 - 99 mg/dL    Sodium 140 136 - 145 mmol/L    Potassium 3.8 3.5 - 5.1 mmol/L    Chloride 108 98 - 112 mmol/L    CO2 28.0 21.0 - 32.0 mmol/L    Anion Gap 4 0 - 18 mmol/L    BUN 9 9 - 23 mg/dL    Creatinine 0.62 0.55 - 1.02 mg/dL    BUN/CREA Ratio 14.5 10.0 - 20.0    Calcium, Total 9.8 8.7 - 10.4 mg/dL    Calculated Osmolality 289 275 - 295 mOsm/kg    eGFR-Cr 110 >=60 mL/min/1.73m2    ALT 40 10 - 49 U/L    AST 39 (H) <=34 U/L    Alkaline Phosphatase 79 39 - 100 U/L    Bilirubin, Total 0.3 0.3 - 1.2 mg/dL    Total Protein 7.5 5.7 - 8.2 g/dL    Albumin 4.5 3.2 - 4.8 g/dL    Globulin  3.0 2.8 - 4.4 g/dL    A/G Ratio 1.5 1.0 - 2.0    Patient Fasting for CMP? No    CBC W/ DIFFERENTIAL    Collection Time: 04/24/24 11:40 AM    Result Value Ref Range    WBC 3.2 (L) 4.0 - 11.0 x10(3) uL    RBC 3.23 (L) 3.80 - 5.30 x10(6)uL    HGB 11.5 (L) 12.0 - 16.0 g/dL    HCT 33.4 (L) 35.0 - 48.0 %    .4 (H) 80.0 - 100.0 fL    MCH 35.6 (H) 26.0 - 34.0 pg    MCHC 34.4 31.0 - 37.0 g/dL    RDW-SD 43.7 35.1 - 46.3 fL    RDW 11.5 11.0 - 15.0 %    .0 150.0 - 450.0 10(3)uL    Neutrophil Absolute Prelim 1.93 1.50 - 7.70 x10 (3) uL    Neutrophil Absolute 1.93 1.50 - 7.70 x10(3) uL    Lymphocyte Absolute 0.74 (L) 1.00 - 4.00 x10(3) uL    Monocyte Absolute 0.38 0.10 - 1.00 x10(3) uL    Eosinophil Absolute 0.10 0.00 - 0.70 x10(3) uL    Basophil Absolute 0.02 0.00 - 0.20 x10(3) uL    Immature Granulocyte Absolute 0.01 0.00 - 1.00 x10(3) uL    Neutrophil % 60.8 %    Lymphocyte % 23.3 %    Monocyte % 11.9 %    Eosinophil % 3.1 %    Basophil % 0.6 %    Immature Granulocyte % 0.3 %         Imaging & Referrals:  None

## 2024-05-01 ENCOUNTER — OFFICE VISIT (OUTPATIENT)
Dept: HEMATOLOGY/ONCOLOGY | Facility: HOSPITAL | Age: 49
End: 2024-05-01
Attending: INTERNAL MEDICINE
Payer: COMMERCIAL

## 2024-05-01 VITALS
HEART RATE: 75 BPM | OXYGEN SATURATION: 97 % | TEMPERATURE: 99 F | SYSTOLIC BLOOD PRESSURE: 123 MMHG | RESPIRATION RATE: 16 BRPM | DIASTOLIC BLOOD PRESSURE: 74 MMHG

## 2024-05-01 DIAGNOSIS — Z45.2 ENCOUNTER FOR CENTRAL LINE CARE: ICD-10-CM

## 2024-05-01 DIAGNOSIS — Z17.0 MALIGNANT NEOPLASM OF CENTRAL PORTION OF LEFT BREAST IN FEMALE, ESTROGEN RECEPTOR POSITIVE (HCC): Primary | ICD-10-CM

## 2024-05-01 DIAGNOSIS — C50.112 MALIGNANT NEOPLASM OF CENTRAL PORTION OF LEFT BREAST IN FEMALE, ESTROGEN RECEPTOR POSITIVE (HCC): Primary | ICD-10-CM

## 2024-05-01 LAB
BASOPHILS # BLD AUTO: 0.04 X10(3) UL (ref 0–0.2)
BASOPHILS NFR BLD AUTO: 1.3 %
DEPRECATED RDW RBC AUTO: 41.8 FL (ref 35.1–46.3)
EOSINOPHIL # BLD AUTO: 0.09 X10(3) UL (ref 0–0.7)
EOSINOPHIL NFR BLD AUTO: 3 %
ERYTHROCYTE [DISTWIDTH] IN BLOOD BY AUTOMATED COUNT: 11.4 % (ref 11–15)
HCT VFR BLD AUTO: 34.1 %
HGB BLD-MCNC: 12.2 G/DL
IMM GRANULOCYTES # BLD AUTO: 0 X10(3) UL (ref 0–1)
IMM GRANULOCYTES NFR BLD: 0 %
LYMPHOCYTES # BLD AUTO: 1 X10(3) UL (ref 1–4)
LYMPHOCYTES NFR BLD AUTO: 32.9 %
MCH RBC QN AUTO: 36 PG (ref 26–34)
MCHC RBC AUTO-ENTMCNC: 35.8 G/DL (ref 31–37)
MCV RBC AUTO: 100.6 FL
MONOCYTES # BLD AUTO: 0.37 X10(3) UL (ref 0.1–1)
MONOCYTES NFR BLD AUTO: 12.2 %
NEUTROPHILS # BLD AUTO: 1.54 X10 (3) UL (ref 1.5–7.7)
NEUTROPHILS # BLD AUTO: 1.54 X10(3) UL (ref 1.5–7.7)
NEUTROPHILS NFR BLD AUTO: 50.6 %
PLATELET # BLD AUTO: 280 10(3)UL (ref 150–450)
RBC # BLD AUTO: 3.39 X10(6)UL
WBC # BLD AUTO: 3 X10(3) UL (ref 4–11)

## 2024-05-01 PROCEDURE — 85025 COMPLETE CBC W/AUTO DIFF WBC: CPT

## 2024-05-01 PROCEDURE — 96413 CHEMO IV INFUSION 1 HR: CPT

## 2024-05-01 PROCEDURE — 96375 TX/PRO/DX INJ NEW DRUG ADDON: CPT

## 2024-05-01 PROCEDURE — S0028 INJECTION, FAMOTIDINE, 20 MG: HCPCS

## 2024-05-01 RX ORDER — DIPHENHYDRAMINE HYDROCHLORIDE 50 MG/ML
INJECTION INTRAMUSCULAR; INTRAVENOUS
Status: COMPLETED
Start: 2024-05-01 | End: 2024-05-01

## 2024-05-01 RX ORDER — HEPARIN SODIUM (PORCINE) LOCK FLUSH IV SOLN 100 UNIT/ML 100 UNIT/ML
5 SOLUTION INTRAVENOUS ONCE
OUTPATIENT
Start: 2024-05-01

## 2024-05-01 RX ORDER — DIPHENHYDRAMINE HYDROCHLORIDE 50 MG/ML
25 INJECTION INTRAMUSCULAR; INTRAVENOUS ONCE
Status: COMPLETED | OUTPATIENT
Start: 2024-05-01 | End: 2024-05-01

## 2024-05-01 RX ORDER — SODIUM CHLORIDE 9 MG/ML
10 INJECTION, SOLUTION INTRAMUSCULAR; INTRAVENOUS; SUBCUTANEOUS ONCE
OUTPATIENT
Start: 2024-05-01

## 2024-05-01 RX ORDER — HEPARIN SODIUM (PORCINE) LOCK FLUSH IV SOLN 100 UNIT/ML 100 UNIT/ML
SOLUTION INTRAVENOUS
Status: COMPLETED
Start: 2024-05-01 | End: 2024-05-01

## 2024-05-01 RX ORDER — FAMOTIDINE 10 MG/ML
20 INJECTION, SOLUTION INTRAVENOUS ONCE
Status: COMPLETED | OUTPATIENT
Start: 2024-05-01 | End: 2024-05-01

## 2024-05-01 RX ORDER — FAMOTIDINE 10 MG/ML
INJECTION, SOLUTION INTRAVENOUS
Status: COMPLETED
Start: 2024-05-01 | End: 2024-05-01

## 2024-05-01 RX ORDER — HEPARIN SODIUM (PORCINE) LOCK FLUSH IV SOLN 100 UNIT/ML 100 UNIT/ML
5 SOLUTION INTRAVENOUS ONCE
Status: COMPLETED | OUTPATIENT
Start: 2024-05-01 | End: 2024-05-01

## 2024-05-01 RX ADMIN — HEPARIN SODIUM (PORCINE) LOCK FLUSH IV SOLN 100 UNIT/ML 500 UNITS: 100 SOLUTION INTRAVENOUS at 14:00:00

## 2024-05-01 RX ADMIN — DIPHENHYDRAMINE HYDROCHLORIDE 25 MG: 50 INJECTION INTRAMUSCULAR; INTRAVENOUS at 12:15:00

## 2024-05-01 RX ADMIN — FAMOTIDINE 20 MG: 10 INJECTION, SOLUTION INTRAVENOUS at 12:13:00

## 2024-05-01 NOTE — PROGRESS NOTES
Pt here for C8D8 Drug(s) Taxol.  Arrives Ambulating independently, accompanied by Self     Patient was evaluated today by Treatment Nurse.  Patient feeling overall well, no worsening neuropathy. Patient complaining of fatigue. Denies nausea / constipation / diarrhea.     Oral medications included in this regimen:  no    Patient confirms comprehension of cancer treatment schedule:  yes    Pregnancy screening:  Denies possibility of pregnancy    Modifications in dose or schedule:  No    Medications appearance and physical integrity checked by RN: yes.    Chemotherapy IV pump settings verified by 2 RNs:  Yes.  Frequency of blood return and site check throughout administration: Prior to administration, Prior to each drug, and At completion of therapy     Infusion/treatment outcome:  patient tolerated treatment without incident    Education Record    Learner:  Patient  Barriers / Limitations:  Language  Method:  Discussion  Education / instructions given:  plan of care  Outcome:  Shows understanding    Discharged Home, Ambulating independently, accompanied by:Self    Patient/family verbalized understanding of future appointments: by [email or web address] messaging

## 2024-05-08 ENCOUNTER — NURSE ONLY (OUTPATIENT)
Dept: HEMATOLOGY/ONCOLOGY | Facility: HOSPITAL | Age: 49
End: 2024-05-08
Attending: INTERNAL MEDICINE
Payer: COMMERCIAL

## 2024-05-08 VITALS
RESPIRATION RATE: 16 BRPM | HEIGHT: 65 IN | OXYGEN SATURATION: 99 % | SYSTOLIC BLOOD PRESSURE: 133 MMHG | WEIGHT: 209.38 LBS | TEMPERATURE: 99 F | HEART RATE: 75 BPM | DIASTOLIC BLOOD PRESSURE: 77 MMHG | BODY MASS INDEX: 34.88 KG/M2

## 2024-05-08 DIAGNOSIS — C50.112 MALIGNANT NEOPLASM OF CENTRAL PORTION OF LEFT BREAST IN FEMALE, ESTROGEN RECEPTOR POSITIVE (HCC): ICD-10-CM

## 2024-05-08 DIAGNOSIS — Z17.0 MALIGNANT NEOPLASM OF CENTRAL PORTION OF LEFT BREAST IN FEMALE, ESTROGEN RECEPTOR POSITIVE (HCC): Primary | ICD-10-CM

## 2024-05-08 DIAGNOSIS — Z17.0 MALIGNANT NEOPLASM OF CENTRAL PORTION OF LEFT BREAST IN FEMALE, ESTROGEN RECEPTOR POSITIVE (HCC): ICD-10-CM

## 2024-05-08 DIAGNOSIS — Z45.2 ENCOUNTER FOR CENTRAL LINE CARE: Primary | ICD-10-CM

## 2024-05-08 DIAGNOSIS — C50.112 MALIGNANT NEOPLASM OF CENTRAL PORTION OF LEFT BREAST IN FEMALE, ESTROGEN RECEPTOR POSITIVE (HCC): Primary | ICD-10-CM

## 2024-05-08 LAB
BASOPHILS # BLD AUTO: 0.03 X10(3) UL (ref 0–0.2)
BASOPHILS NFR BLD AUTO: 1.1 %
DEPRECATED RDW RBC AUTO: 41.4 FL (ref 35.1–46.3)
EOSINOPHIL # BLD AUTO: 0.1 X10(3) UL (ref 0–0.7)
EOSINOPHIL NFR BLD AUTO: 3.5 %
ERYTHROCYTE [DISTWIDTH] IN BLOOD BY AUTOMATED COUNT: 11.4 % (ref 11–15)
HCT VFR BLD AUTO: 33.6 %
HGB BLD-MCNC: 11.7 G/DL
IMM GRANULOCYTES # BLD AUTO: 0.01 X10(3) UL (ref 0–1)
IMM GRANULOCYTES NFR BLD: 0.4 %
LYMPHOCYTES # BLD AUTO: 0.81 X10(3) UL (ref 1–4)
LYMPHOCYTES NFR BLD AUTO: 28.7 %
MCH RBC QN AUTO: 34.8 PG (ref 26–34)
MCHC RBC AUTO-ENTMCNC: 34.8 G/DL (ref 31–37)
MCV RBC AUTO: 100 FL
MONOCYTES # BLD AUTO: 0.24 X10(3) UL (ref 0.1–1)
MONOCYTES NFR BLD AUTO: 8.5 %
NEUTROPHILS # BLD AUTO: 1.63 X10 (3) UL (ref 1.5–7.7)
NEUTROPHILS # BLD AUTO: 1.63 X10(3) UL (ref 1.5–7.7)
NEUTROPHILS NFR BLD AUTO: 57.8 %
PLATELET # BLD AUTO: 275 10(3)UL (ref 150–450)
RBC # BLD AUTO: 3.36 X10(6)UL
WBC # BLD AUTO: 2.8 X10(3) UL (ref 4–11)

## 2024-05-08 PROCEDURE — 96413 CHEMO IV INFUSION 1 HR: CPT

## 2024-05-08 PROCEDURE — 96367 TX/PROPH/DG ADDL SEQ IV INF: CPT

## 2024-05-08 PROCEDURE — S0028 INJECTION, FAMOTIDINE, 20 MG: HCPCS

## 2024-05-08 PROCEDURE — 85025 COMPLETE CBC W/AUTO DIFF WBC: CPT

## 2024-05-08 PROCEDURE — 96375 TX/PRO/DX INJ NEW DRUG ADDON: CPT

## 2024-05-08 RX ORDER — FAMOTIDINE 10 MG/ML
20 INJECTION, SOLUTION INTRAVENOUS ONCE
Status: COMPLETED | OUTPATIENT
Start: 2024-05-08 | End: 2024-05-08

## 2024-05-08 RX ORDER — HEPARIN SODIUM (PORCINE) LOCK FLUSH IV SOLN 100 UNIT/ML 100 UNIT/ML
5 SOLUTION INTRAVENOUS ONCE
Status: COMPLETED | OUTPATIENT
Start: 2024-05-08 | End: 2024-05-08

## 2024-05-08 RX ORDER — DIPHENHYDRAMINE HYDROCHLORIDE 50 MG/ML
25 INJECTION INTRAMUSCULAR; INTRAVENOUS ONCE
Status: COMPLETED | OUTPATIENT
Start: 2024-05-08 | End: 2024-05-08

## 2024-05-08 RX ORDER — HEPARIN SODIUM (PORCINE) LOCK FLUSH IV SOLN 100 UNIT/ML 100 UNIT/ML
5 SOLUTION INTRAVENOUS ONCE
OUTPATIENT
Start: 2024-05-08

## 2024-05-08 RX ORDER — SODIUM CHLORIDE 9 MG/ML
10 INJECTION, SOLUTION INTRAMUSCULAR; INTRAVENOUS; SUBCUTANEOUS ONCE
OUTPATIENT
Start: 2024-05-08

## 2024-05-08 RX ORDER — FAMOTIDINE 10 MG/ML
INJECTION, SOLUTION INTRAVENOUS
Status: COMPLETED
Start: 2024-05-08 | End: 2024-05-08

## 2024-05-08 RX ORDER — DIPHENHYDRAMINE HYDROCHLORIDE 50 MG/ML
INJECTION INTRAMUSCULAR; INTRAVENOUS
Status: COMPLETED
Start: 2024-05-08 | End: 2024-05-08

## 2024-05-08 RX ADMIN — HEPARIN SODIUM (PORCINE) LOCK FLUSH IV SOLN 100 UNIT/ML 500 UNITS: 100 SOLUTION INTRAVENOUS at 12:30:00

## 2024-05-08 RX ADMIN — DIPHENHYDRAMINE HYDROCHLORIDE 25 MG: 50 INJECTION INTRAMUSCULAR; INTRAVENOUS at 10:57:00

## 2024-05-08 RX ADMIN — FAMOTIDINE 20 MG: 10 INJECTION, SOLUTION INTRAVENOUS at 10:55:00

## 2024-05-08 NOTE — PROGRESS NOTES
Patient here for C8D15 Taxol.  Arrives Ambulating independently, accompanied by Self. Labs reviewed. Patient denies new complaints or concerns today.     Patient was evaluated today by Treatment Nurse.    Oral medications included in this regimen:  no    Patient confirms comprehension of cancer treatment schedule:  yes    Pregnancy screening:  Denies possibility of pregnancy    Modifications in dose or schedule:  No    Medications appearance and physical integrity checked by RN: yes.    Chemotherapy IV pump settings verified by 2 RNs:  Yes.  Frequency of blood return and site check throughout administration: Prior to administration and At completion of therapy     Infusion/treatment outcome:  patient tolerated treatment without incident    Education Record    Learner:  Patient  Barriers / Limitations:  None  Method:  Reinforcement  Education / instructions given:  Reviewed plan of care.   Outcome:  Shows understanding    Discharged Home, Ambulating independently, accompanied by:Self    Patient/family verbalized understanding of future appointments: by MyChart messaging

## 2024-05-15 ENCOUNTER — OFFICE VISIT (OUTPATIENT)
Dept: HEMATOLOGY/ONCOLOGY | Facility: HOSPITAL | Age: 49
End: 2024-05-15
Attending: INTERNAL MEDICINE

## 2024-05-15 VITALS
WEIGHT: 210 LBS | BODY MASS INDEX: 34.99 KG/M2 | DIASTOLIC BLOOD PRESSURE: 78 MMHG | HEIGHT: 65 IN | OXYGEN SATURATION: 100 % | SYSTOLIC BLOOD PRESSURE: 137 MMHG | TEMPERATURE: 98 F | HEART RATE: 95 BPM | RESPIRATION RATE: 16 BRPM

## 2024-05-15 DIAGNOSIS — Z17.0 MALIGNANT NEOPLASM OF CENTRAL PORTION OF LEFT BREAST IN FEMALE, ESTROGEN RECEPTOR POSITIVE (HCC): Primary | ICD-10-CM

## 2024-05-15 DIAGNOSIS — Z95.828 PORT-A-CATH IN PLACE: ICD-10-CM

## 2024-05-15 DIAGNOSIS — C50.112 MALIGNANT NEOPLASM OF CENTRAL PORTION OF LEFT BREAST IN FEMALE, ESTROGEN RECEPTOR POSITIVE (HCC): Primary | ICD-10-CM

## 2024-05-15 DIAGNOSIS — T45.1X5A CHEMOTHERAPY-INDUCED PERIPHERAL NEUROPATHY (HCC): ICD-10-CM

## 2024-05-15 DIAGNOSIS — G62.0 CHEMOTHERAPY-INDUCED PERIPHERAL NEUROPATHY (HCC): ICD-10-CM

## 2024-05-15 DIAGNOSIS — Z09 CHEMOTHERAPY FOLLOW-UP EXAMINATION: ICD-10-CM

## 2024-05-15 PROCEDURE — 99215 OFFICE O/P EST HI 40 MIN: CPT | Performed by: INTERNAL MEDICINE

## 2024-05-15 RX ORDER — CLINDAMYCIN HYDROCHLORIDE 300 MG/1
300 CAPSULE ORAL 3 TIMES DAILY
COMMUNITY
Start: 2024-05-10

## 2024-05-15 NOTE — PROGRESS NOTES
HPI   Alisia Berg is a 48 year old female here at the request of NORBERTO SERNA MD for evaluation of   Encounter Diagnoses   Name Primary?    Malignant neoplasm of central portion of left breast in female, estrogen receptor positive (HCC) Yes    Chemotherapy follow-up examination     Chemotherapy-induced peripheral neuropathy (HCC)     Port-A-Cath in place        Patient here status post left-sided lumpectomy with axillary lymph node dissection on 11/6/2023.    Patient is currently s/p cycle 4 of weekly Taxol.  She has completed her 4 cycles of dose dense AC.    Fatigue:  Yes, but improving.      Fevers:  No    Appetite/taste changes:  Yes, improving.    Mucositis:  No, improved with rinsing.  Tender lower left gum, at the bicuspids has area that is like a white pustule. Continues with rinses.  Will have dental appointment after treatment.  Went to he dentist and on antibiotics for 2-3 more days.  Needs an extraction.  Also on medicated rinse.     Weight changes:  No    Nausea/vomiting:  Yes, but less nausea, took meds two days.    Diarrhea:  No    Constipation:  No.  Eating prunes and improved, no issues with hemorrhoids.     Peripheral neuropathy:  Yes, finger and toe tips, the same.   Sometimes glasses can slip.  No issues with temperature.     Skin: hyperpigmentation at finger nails.Thumb nails thickening     Sometimes pain at L breast surgical site. Good ROM L arm.   States pain more after last chemo and feels a cording.       LMP January 2024    ECOG PS  1- mild fatigue    Review of Systems:   Review of Systems   HENT:   Positive for nosebleeds (on occasion.).    Respiratory:  Positive for shortness of breath (sometimes states feels hard to breathe without activity or with weather changes.). Negative for cough.    Cardiovascular:  Positive for chest pain (states while on the antibiotics.).   Gastrointestinal:  Negative for abdominal pain.   Endocrine: Positive for hot flashes.   Genitourinary:   Negative for dysuria and frequency.    Musculoskeletal:  Positive for arthralgias (hip and ankle if walks too much) and flank pain (on the left, better when drinks more water.). Negative for back pain and neck pain.   Neurological:  Positive for headaches (when in the sun and it is hot.  Takes tylenol when needed.). Negative for dizziness.   Hematological:  Negative for adenopathy. Does not bruise/bleed easily.   Psychiatric/Behavioral:  Negative for sleep disturbance.            Current Outpatient Medications   Medication Sig Dispense Refill    clindamycin 300 MG Oral Cap Take 1 capsule (300 mg total) by mouth 3 (three) times daily.      carBAMazepine 200 MG Oral Tab Take 1 tablet (200 mg total) by mouth 2 (two) times daily.      losartan 50 MG Oral Tab Take 1 tablet (50 mg total) by mouth daily.       Allergies:   No Known Allergies    Past Medical History:    High blood pressure    Hypertension    Trigeminal neuralgia of left side of face     Past Surgical History:   Procedure Laterality Date    Lumpectomy left  2023    Needle biopsy right Right     bx done at age 20yrs old          x2     Social History     Socioeconomic History    Marital status:    Tobacco Use    Smoking status: Never    Smokeless tobacco: Never   Substance and Sexual Activity    Alcohol use: Never    Drug use: Never       Family History   Problem Relation Age of Onset    Breast Cancer Mother 34    Cancer Paternal Grandfather         unknown type    Ovarian Cancer Neg          PHYSICAL EXAM:    /78 (BP Location: Right arm, Patient Position: Sitting, Cuff Size: large)   Pulse 95   Temp 98.1 °F (36.7 °C) (Oral)   Resp 16   Ht 1.651 m (5' 5\")   Wt 95.3 kg (210 lb)   LMP 12/15/2023 (Approximate)   SpO2 100%   BMI 34.95 kg/m²   Wt Readings from Last 6 Encounters:   05/15/24 95.3 kg (210 lb)   24 95 kg (209 lb 6.4 oz)   24 92.8 kg (204 lb 9.6 oz)   04/10/24 93 kg (205 lb)   24 93.4 kg (206 lb)    03/27/24 92.8 kg (204 lb 9.6 oz)     Physical Exam  General: Patient is alert, not in acute distress.  HEENT: EOMs intact. PERRL. Oropharynx is clear. L lower gum inflamed with whitish pustule.  Neck: No JVD. No palpable lymphadenopathy. Neck is supple.  Chest: Clear to auscultation.  Breasts:    Heart: Regular rate and rhythm.   Abdomen: Soft, non tender with good bowel sounds.  Extremities: No edema.  Neurological: Grossly intact.   Lymphatics: There is no palpable lymphadenopathy throughout in the cervical, supraclavicular, axillary, or inguinal regions.  Psych/Depression: nl  Skin:  hyperpigmentation of the finger nails and dorsum of hands. Thumb nails lifting. Alopecia.        ASSESSMENT/PLAN:     1. Malignant neoplasm of central portion of left breast in female, estrogen receptor positive (HCC)    2. Chemotherapy follow-up examination    3. Chemotherapy-induced peripheral neuropathy (HCC)    4. Port-A-Cath in place        1. Malignant neoplasm of central portion of left breast in female, estrogen receptor positive    Cancer Staging   Malignant neoplasm of central portion of left breast in female, estrogen receptor positive (HCC)  Staging form: Breast, AJCC 8th Edition  - Clinical stage from 9/29/2023: Stage IB (cT1c, cN1(f), cM0, G2, ER+, IL+, HER2-) - Signed by Montrell Heaton MD on 9/29/2023  - Pathologic stage from 11/15/2023: Stage IB (pT1c, pN2a, cM0, G2, ER+, IL+, HER2-) - Signed by Montrell Heaton MD on 11/15/2023    -Genetic testing was negative.    -Patient underwent a left-sided lumpectomy with sentinel lymph node which was positive, and completion axillary dissection on 11/6/2023.  She had 4 of 25 positive lymph nodes.    -Discussed with patient standard of care is to proceed with adjuvant chemotherapy within 4 to 8 weeks postoperatively.  Given her extensive beatriz involvement, recommend to proceed with adjuvant chemotherapy with dose dense Adriamycin Cytoxan followed by weekly Taxol.  Patient  will have baseline cardiac function assessment with either a TTE or MUGA.  Also recommend port placement for safe administration of treatment. Briefly discussed potential SE of treatment, will discuss further on teaching session.      Completed 4 cycles of adjuvant DD AC,    S/p cycle 4 of weekly taxol    -Patient will proceed with radiation treatment.     - Will certainly benefit from adjuvant endocrine therapy, based on her hormone status.  Baseline hormone levels obtained, and patient appears to be in the postmenopausal range.  We will repeat this again in 1 year after last period.  Duration of adjuvant endocrine therapy will be for at least 5 years, however this could be up to 10 years.  Determination on duration of adjuvant endocrine therapy pending on some more emerging data regarding which patient population benefits from extended endocrine therapy.  Given his beatriz status, discussed with patient that would favor up to 10 years.  Will initiate adjuvant endocrine therapy at completion of radiation therapy.    --Would like port removed as it is bothering her when exercising.    Return for Follow-up after RT.    No orders of the defined types were placed in this encounter.    MDM high risk.  Results From Past 48 Hours:  No results found for this or any previous visit (from the past 48 hour(s)).      Imaging & Referrals:  OCCUPATIONAL THERAPY - INTERNAL  IR PORT A CATHETER REMOVAL     Collected 5/8/2024 10:00       Status: Final result       Dx: Malignant neoplasm of central portion...    0 Result Notes      Component  Ref Range & Units 5/8/24 1000   WBC  4.0 - 11.0 x10(3) uL 2.8 Low    RBC  3.80 - 5.30 x10(6)uL 3.36 Low    HGB  12.0 - 16.0 g/dL 11.7 Low    HCT  35.0 - 48.0 % 33.6 Low    MCV  80.0 - 100.0 fL 100.0   MCH  26.0 - 34.0 pg 34.8 High    MCHC  31.0 - 37.0 g/dL 34.8   RDW-SD  35.1 - 46.3 fL 41.4   RDW  11.0 - 15.0 % 11.4   PLT  150.0 - 450.0 10(3)uL 275.0   Neutrophil Absolute Prelim  1.50 - 7.70 x10  (3) uL 1.63   Neutrophil Absolute  1.50 - 7.70 x10(3) uL 1.63   Lymphocyte Absolute  1.00 - 4.00 x10(3) uL 0.81 Low    Monocyte Absolute  0.10 - 1.00 x10(3) uL 0.24   Eosinophil Absolute  0.00 - 0.70 x10(3) uL 0.10   Basophil Absolute  0.00 - 0.20 x10(3) uL 0.03   Immature Granulocyte Absolute  0.00 - 1.00 x10(3) uL 0.01   Neutrophil %  % 57.8   Lymphocyte %  % 28.7   Monocyte %  % 8.5   Eosinophil %  % 3.5   Basophil %  % 1.1   Immature Granulocyte %  % 0.4   Resulting Agency [facility] ([facility])              Specimen Collected: 05/08/24 10:00 Last Resulted: 05/08/24 10:26

## 2024-05-15 NOTE — PATIENT INSTRUCTIONS
AURELIA Diamond DDS Blake C. Garrett, DDS MD    [address] [place] [address]  [place] Office Phone [phone]

## 2024-05-22 ENCOUNTER — OFFICE VISIT (OUTPATIENT)
Dept: RADIATION ONCOLOGY | Facility: HOSPITAL | Age: 49
End: 2024-05-22
Attending: INTERNAL MEDICINE

## 2024-05-22 VITALS
HEART RATE: 84 BPM | SYSTOLIC BLOOD PRESSURE: 119 MMHG | DIASTOLIC BLOOD PRESSURE: 73 MMHG | OXYGEN SATURATION: 98 % | WEIGHT: 208.38 LBS | BODY MASS INDEX: 35 KG/M2 | RESPIRATION RATE: 16 BRPM | TEMPERATURE: 98 F

## 2024-05-22 DIAGNOSIS — C50.912 MALIGNANT NEOPLASM OF LEFT BREAST IN FEMALE, ESTROGEN RECEPTOR POSITIVE, UNSPECIFIED SITE OF BREAST (HCC): Primary | ICD-10-CM

## 2024-05-22 DIAGNOSIS — Z17.0 MALIGNANT NEOPLASM OF LEFT BREAST IN FEMALE, ESTROGEN RECEPTOR POSITIVE, UNSPECIFIED SITE OF BREAST (HCC): Primary | ICD-10-CM

## 2024-05-22 PROCEDURE — 99212 OFFICE O/P EST SF 10 MIN: CPT

## 2024-05-22 RX ORDER — MOMETASONE FUROATE 1 MG/G
CREAM TOPICAL
Qty: 50 G | Refills: 5 | Status: SHIPPED | OUTPATIENT
Start: 2024-05-22

## 2024-05-22 NOTE — PATIENT INSTRUCTIONS
- We will call you to schedule the radiation planning session.    - Call [phone] for radiation questions.    - Mometasone RX sent to your pharmacy.

## 2024-05-22 NOTE — PROGRESS NOTES
Nursing Consultation Note  Patient: Alisia Berg  YOB: 1975  Age: 48 year old  Radiation Oncologist: Dr. Rosario Jose  Referring Physician: Montrell Heaton  Diagnosis:[unfilled]  Consult Date: 5/22/2024      Chemotherapy: No  Labs: Reviewed  Imaging: Reviewed  Is the patient of child-bearing age?         Yes   Female: LMP: January 2024 Has egg harvesting been discussed? No ... Is there any possibility that the patient is pregnant?   No    Has the patient received radiation therapy in the past? no  Does the patient have an implantable device?No -  on R chest, scheduled for removal w/ IR on 5/28  Patient has/has had:     1. Assistive Devices: N/A    2. Flu Vaccination: no-referral to ask PCP    3. Pneumonia Vaccination:  no--referral to ask PCP    Vital Signs:   Vitals:    05/22/24 0834   BP: 119/73   Pulse: 84   Resp: 16   Temp: 97.6 °F (36.4 °C)   ,   Wt Readings from Last 6 Encounters:   05/22/24 94.5 kg (208 lb 6.4 oz)   05/15/24 95.3 kg (210 lb)   05/08/24 95 kg (209 lb 6.4 oz)   04/24/24 92.8 kg (204 lb 9.6 oz)   04/10/24 93 kg (205 lb)   04/03/24 93.4 kg (206 lb)       Nursing Note:      Review of Systems   Constitutional: Negative.    HENT: Negative.     Eyes: Negative.    Respiratory:  Positive for shortness of breath.         SOB with humidity outside   Cardiovascular: Negative.    Gastrointestinal: Negative.    Endocrine: Negative.    Genitourinary: Negative.    Musculoskeletal:  Positive for back pain.        Tylenol PRN   Skin: Negative.    Allergic/Immunologic: Negative.    Neurological: Negative.    Hematological: Negative.    Psychiatric/Behavioral: Negative.            Allergies:  No Known Allergies    Current Outpatient Medications   Medication Sig Dispense Refill    carBAMazepine 200 MG Oral Tab Take 1 tablet (200 mg total) by mouth 2 (two) times daily.      losartan 50 MG Oral Tab Take 1 tablet (50 mg total) by mouth at bedtime.         Preferred Pharmacy:    [facility]  [id number] - [place], [place] - [address] AT [address]   [address], [phone], [phone]  [address]  [place] [address]  Phone: [phone] Fax: [phone]      Past Medical History:    High blood pressure    Hypertension    Trigeminal neuralgia of left side of face       Past Surgical History:   Procedure Laterality Date    Lumpectomy left  2023    Needle biopsy right Right     bx done at age 20yrs old          x2       Social History     Socioeconomic History    Marital status:      Spouse name: Not on file    Number of children: Not on file    Years of education: Not on file    Highest education level: Not on file   Occupational History    Not on file   Tobacco Use    Smoking status: Never    Smokeless tobacco: Never   Substance and Sexual Activity    Alcohol use: Never    Drug use: Never    Sexual activity: Not on file   Other Topics Concern    Not on file   Social History Narrative    Not on file     Social Determinants of Health     Financial Resource Strain: Not on file   Food Insecurity: Not on file   Transportation Needs: Not on file   Physical Activity: Not on file   Stress: Not on file   Social Connections: Not on file   Housing Stability: Not on file       ECOG:  Grade 0 - Fully active, able to carry on all predisease activities without restrictions.      Education:  Yes    Are ADL's met?  Yes  Does patient feel safe in their environment?  Yes  Care decisions:  Patient and/or surrogate IS involved in care decisions.  Advanced directives:  Patient DOES NOT have advanced directives.  Transportation:  Adequate transportation available for expected visits    Pain:   ;Pain Score: 0   ;    ;       Primary language:  English  Language line required?  no  Comprehension Ability:  good  Able to read?  yes  Able to write?  yes  Communication tools:   none  Patient's ability to learn:  good  Readiness to learn:  Motivated  Learning preferences:  Discussion and Handout  Barriers to  learning:  None  Interventions to reduce barriers:  Consult, Face patient when speaking, Provide support/encouragement, Provide printed materials, and Patient to express feelings  Visual aids:  yes  Hearing disability:  no  Dentures:  no    Knowledge Deficit Plan Of Care:    Problem:  Knowledge Deficit    Problems related to:    Radiation therapy    Interventions:  Assess patient knowledge level  Assess knowledge needs  Instruct on the disease process  Instruct on treatment planning  Instruct on radiation therapy appointment scheduling  Instruct on basic skin care  Instruct on purpose of radiation therapy  Instruct on side effects of radiation therapy    Expected Outcomes:  Knowledge of radiation therapy  Knowledge of side effects of radiation therapy and management  Comfortable with knowledge level    Progress Toward Outcome:  Making progress    Pamphlets/Handouts Given to Patient:  Radiation Process Overview       Skin Plan Of Care:    Skin Problem:  Potential for impaired skin integrity    Problems related to:    Side effects of radiation therapy    Interventions:  Assess skin Monitor signs/symptoms of healing Instruct patient on skin care Instruct patient on basix skin care measures during radiation therapy Instruct patient on additional skin care measures during radiation therapy Keep area clean and dry Encourage fluids/diet    Expected Outcomes:  Intact skin Skin without trauma or irritation Free from infection Wound evidence of healing Knowledge of disease process knowledge of care plan    Progress Toward Outcome:  Making progress    Pamphlets/Handouts Given to Patient:  Radiation therapy symptom management for skin  Site specific side effects skin    Pt seen for consultation with Dr. Jose. I explained to the patient that today she would meet Dr. Jose but while being treated there was a possibility that she might also encounter the physicians who cover for Dr. Jose which are Dr. Bishop, Dr. Willis, and   Parveen Shepard. VSS, pt denies pain. Pt educated on radiaton process, as well as possible side effects. Discussed moisturizer and mometaosne use with patient, pt states her understanding. Pt s/p lumpectomy and chemotherapy, doing well.  removal scheduled for end of May. CT sim task sent.

## 2024-05-22 NOTE — CONSULTS
[facility]  Radiation Oncology New Consultation      Patient Name: Alisia Berg   MRN: [id number]  PCP: NORBERTO SERNA MD  Referring Physician: Pascale Sparks MD; Montrell Heaton MD    Diagnosis Site: left beast  Stage: pT1c (1.3 cm) N2a (4/25, 1.2 cm) cM0  Histology: IDC, grade 3, ER/VA+, HER2-, Ki-67 intermediate with +PHAM and +LVSI    Reason for Consultation: discussion of adjuvant RT    HPI: The patient is a 48 year old female who was seen today for consultation regarding the above diagnosis.     9/7/2023: Screening mammogram with left breast architectural distortion.    9/15/2023: Diagnostic imaging showing persistent architectural distortion in the slightly upper central left breast.  Ultrasound demonstrated a 1.1 cm irregular hypoechoic lesion at 12:00, 2 cm from the nipple.  There was a left axillary lymph node measuring 1.3 cm with cortical thickening.    9/22/2023: Left breast biopsies  -12:00 mass showing invasive ductal carcinoma, grade 2  -Lymph node positive for metastatic breast cancer  -Tumor markers were ER/VA positive, HER2 negative, Ki-67 14%    Staging CT and bone scan were negative for distant disease.    10/9/2023: Breast MRI without other suspicious areas.    11/6/2023: Left breast lumpectomy and axillary lymph node dissection  -Invasive ductal carcinoma, grade 3 measuring 1.3 cm  -DCIS, grade 2  +extensive LVSI  -Margins negative  -4/25 lymph nodes with metastatic disease, largest focus 1.2 cm with PHAM  -pT1c N2a    She completed adjuvant ddAC-T x4.   -resolving issues with mucositis and peripheral neuropathy     Recommendation is to start endocrine therapy after RT.   Doing well, good ROM, no overt lymphedema in LUE but does wear her sleeve.     Past Medical History  Past Medical History:    High blood pressure    Hypertension    Trigeminal neuralgia of left side of face       Past Surgical History  Past Surgical History:   Procedure Laterality Date    Lumpectomy  left  2023    Needle biopsy right Right     bx done at age 20yrs old          x2       Medications  Current Outpatient Medications   Medication Sig Dispense Refill    carBAMazepine 200 MG Oral Tab Take 1 tablet (200 mg total) by mouth 2 (two) times daily.      losartan 50 MG Oral Tab Take 1 tablet (50 mg total) by mouth at bedtime.         Allergies  No Known Allergies    OB/GYN History  -  -Age of first birth: 19  -Breast feeding: yes  -Age of menarche: 14  -LMP: 2024  -OCP use: none  -HRT use: none  -Fertility treatments: none    Social History  -Employment: on short-term disability   -Living situation: , lives in [place]  -Tobacco: never smoker  -Alcohol: none    Family History  Family History   Problem Relation Age of Onset    Breast Cancer Mother 34    Cancer Paternal Grandfather         unknown type    Ovarian Cancer Neg      Review of Systems  Otherwise negative except as noted in HPI.     No prior history of radiation.  No history of lupus, collagen-vascular disease, or inflammatory bowel disease.     Physical Exam  Vitals:    24 0834   BP: 119/73   Pulse: 84   Resp: 16   Temp: 97.6 °F (36.4 °C)     ECO-1    Gen: NAD  HEENT: NCAT, EOMI  Neck: no LAD  CV: RRR  Lungs: CTAB  Ext: wwp  Neuro: CN II-XII grossly intact  Breast: L breast with normal contours, no masses or LAD      Assessment: 49yo F with imaging detected L breast cancer. She underwent margin negative lumpectomy and ALND showing final pT1c N2a IDC, grade 3, ER/NE+, HER2- with +PHAM and +LVSI. She completed adjuvant ddAC-T and presents for consideration of adjuvant RT.    Plan:     I discussed the above clinical situation with the patient at the time of consultation.     Given the above pathology, I recommended a course of adjuvant external beam radiation therapy to the left breast and draining lymphatics using a standard fractionation approach over 6 weeks.    The purpose of treatment is for improved local control of  disease and potential improved survival. Randomized studies have reported an improved local control and survival rate with adjuvant radiation in node positive disease.     Given left sided disease, DIBH will be required for normal tissue sparing.     We discussed the potential short-term and long-term side effects of radiotherapy. All questions were answered, and no guarantee of safety or efficacy was made. Alternatives to radiotherapy were discussed with the patient.    The patient is agreeable to proceed with radiotherapy.   They will return to our department for CT simulation.   Mometasone sent to pharmacy.     Rosario oJse MD  Radiation Oncology    MDM high including chart review, personal review of imaging, and time spent with the patient in counseling.

## 2024-05-24 ENCOUNTER — HOSPITAL ENCOUNTER (OUTPATIENT)
Dept: RADIATION ONCOLOGY | Facility: HOSPITAL | Age: 49
Discharge: HOME | End: 2024-05-24
Attending: INTERNAL MEDICINE
Payer: COMMERCIAL

## 2024-05-28 ENCOUNTER — HOSPITAL ENCOUNTER (OUTPATIENT)
Dept: INTERVENTIONAL RADIOLOGY/VASCULAR | Facility: HOSPITAL | Age: 49
Discharge: HOME | End: 2024-05-28
Attending: INTERNAL MEDICINE | Admitting: STUDENT IN AN ORGANIZED HEALTH CARE EDUCATION/TRAINING PROGRAM

## 2024-05-28 VITALS
TEMPERATURE: 97 F | SYSTOLIC BLOOD PRESSURE: 120 MMHG | HEART RATE: 68 BPM | OXYGEN SATURATION: 98 % | WEIGHT: 210 LBS | RESPIRATION RATE: 17 BRPM | DIASTOLIC BLOOD PRESSURE: 77 MMHG | BODY MASS INDEX: 34.99 KG/M2 | HEIGHT: 65 IN

## 2024-05-28 DIAGNOSIS — Z95.828 PORT-A-CATH IN PLACE: ICD-10-CM

## 2024-05-28 LAB — B-HCG UR QL: NEGATIVE

## 2024-05-28 PROCEDURE — 0JPT3WZ REMOVAL OF TOTALLY IMPLANTABLE VASCULAR ACCESS DEVICE FROM TRUNK SUBCUTANEOUS TISSUE AND FASCIA, PERCUTANEOUS APPROACH: ICD-10-PCS | Performed by: STUDENT IN AN ORGANIZED HEALTH CARE EDUCATION/TRAINING PROGRAM

## 2024-05-28 PROCEDURE — 36415 COLL VENOUS BLD VENIPUNCTURE: CPT

## 2024-05-28 PROCEDURE — 99152 MOD SED SAME PHYS/QHP 5/>YRS: CPT | Performed by: STUDENT IN AN ORGANIZED HEALTH CARE EDUCATION/TRAINING PROGRAM

## 2024-05-28 PROCEDURE — 81025 URINE PREGNANCY TEST: CPT

## 2024-05-28 PROCEDURE — 36590 REMOVAL TUNNELED CV CATH: CPT | Performed by: STUDENT IN AN ORGANIZED HEALTH CARE EDUCATION/TRAINING PROGRAM

## 2024-05-28 RX ORDER — SODIUM CHLORIDE 9 MG/ML
INJECTION, SOLUTION INTRAVENOUS CONTINUOUS
Status: DISCONTINUED | OUTPATIENT
Start: 2024-05-28 | End: 2024-05-28

## 2024-05-28 RX ORDER — MIDAZOLAM HYDROCHLORIDE 1 MG/ML
INJECTION INTRAMUSCULAR; INTRAVENOUS
Status: COMPLETED
Start: 2024-05-28 | End: 2024-05-28

## 2024-05-28 RX ORDER — LIDOCAINE HYDROCHLORIDE 20 MG/ML
INJECTION, SOLUTION INFILTRATION; PERINEURAL
Status: COMPLETED
Start: 2024-05-28 | End: 2024-05-28

## 2024-05-28 RX ADMIN — SODIUM CHLORIDE: 9 INJECTION, SOLUTION INTRAVENOUS at 07:45:00

## 2024-05-28 NOTE — IVS NOTE
Dr. Michelle at bedside to perform Chest Port Removal. Patient draped in sterile fashion. 1mg of Versed and 50mcg of Fentanyl given for sedation. 20ml of lidocaine given for local anesthetic. Port and catheter removed intact. Site closed with sutures and skin glue. No bleeding or hematoma noted. Vital signs stable. Report given to Esperanza SANTIZO.

## 2024-05-28 NOTE — DISCHARGE INSTRUCTIONS
INTERVENTIONAL RADIOLOGY  [facility]  [phone]     Patient Name:  Alisia Berg    Procedure:  Port Removal    Site Care: Dermabond (skin glue) has been applied to your incision.  Do not scrub the area.  Allow the Dermabond to flake off on its own.                                       Activity/Diet  No heavy lifting or strenuous activity for 48 hours.  Drink plenty of fluids, unless you have otherwise been told to restrict your fluid intake.  Do not drink alcohol for 24 hours.  Do not drive,  operate heavy machinery, make important decisions or sign legal documents today.    Medications:  Take acetaminophen if needed for pain. Do not exceed 4000mg of acetaminophen in a 24-hour period. and Make no changes to your existing medications.    Contact Interventional Radiology at [phone] if you have severe/unrelieved pain, fever, chills, dizziness/lightheadedness, or drainage/bleeding from your incision site.

## 2024-05-28 NOTE — PROCEDURES
Interventional Radiology  Brief Post-Procedure Note    Procedure(s): port removal    Indication: breast cancer, completed chemo    (s): Viviana    Anesthesia: Sedation    Findings:  right chest port and catheter removal in their entirety    Blood loss: <5 mL      Complications: None    Plan: no IR follow up needed    Please refer to full dictation under the \"Imaging\" tab in Epic.    Kumar Michelle MD  5/28/2024  Interventional Radiology  [facility]

## 2024-05-28 NOTE — INTERVAL H&P NOTE
The above referenced H&P was reviewed by Kumar Michelle MD on 5/28/2024, the patient was examined and no significant changes have occurred in the patient's condition since the H&P was performed.  Risks, benefits, alternative treatments and consequences of no treatment were discussed.  We will proceed with procedure as planned.      Kumar Michelle MD  5/28/2024  8:57 AM

## 2024-05-28 NOTE — PROGRESS NOTES
DISCHARGE NOTE      Pt is able to sit up and ambulate without difficulty.   Pt voided and tolerated fluids and food.   Procedural site remains dry and intact with good circulation, motion, and sensation.   No signs and symptoms of bleeding/hematoma noted.   Instruction provided, patient/family verbalizes understanding.   Dr. Michelle spoke with patient/family post procedure.

## 2024-05-29 ENCOUNTER — LAB ENCOUNTER (OUTPATIENT)
Dept: LAB | Facility: REFERENCE LAB | Age: 49
End: 2024-05-29
Attending: FAMILY MEDICINE
Payer: COMMERCIAL

## 2024-05-29 DIAGNOSIS — E66.09 OBESITY DUE TO EXCESS CALORIES: ICD-10-CM

## 2024-05-29 DIAGNOSIS — I97.2 POSTMASTECTOMY LYMPHEDEMA SYNDROME: ICD-10-CM

## 2024-05-29 DIAGNOSIS — D05.81 INTRADUCTAL CARCINOMA AND LOBULAR CARCINOMA IN SITU, RIGHT: ICD-10-CM

## 2024-05-29 DIAGNOSIS — Z00.01 ENCOUNTER FOR GENERAL ADULT MEDICAL EXAMINATION WITH ABNORMAL FINDINGS: Primary | ICD-10-CM

## 2024-05-29 DIAGNOSIS — I10 HTN (HYPERTENSION): ICD-10-CM

## 2024-05-29 LAB
ALBUMIN SERPL-MCNC: 4.7 G/DL (ref 3.2–4.8)
ALBUMIN/GLOB SERPL: 1.4 {RATIO} (ref 1–2)
ALP LIVER SERPL-CCNC: 76 U/L
ALT SERPL-CCNC: 20 U/L
ANION GAP SERPL CALC-SCNC: 7 MMOL/L (ref 0–18)
AST SERPL-CCNC: 21 U/L (ref ?–34)
BASOPHILS # BLD AUTO: 0.04 X10(3) UL (ref 0–0.2)
BASOPHILS NFR BLD AUTO: 1.1 %
BILIRUB SERPL-MCNC: 0.4 MG/DL (ref 0.3–1.2)
BILIRUB UR QL: NEGATIVE
BUN BLD-MCNC: 8 MG/DL (ref 9–23)
BUN/CREAT SERPL: 11.8 (ref 10–20)
CALCIUM BLD-MCNC: 10.2 MG/DL (ref 8.7–10.4)
CHLORIDE SERPL-SCNC: 106 MMOL/L (ref 98–112)
CHOLEST SERPL-MCNC: 276 MG/DL (ref ?–200)
CLARITY UR: CLEAR
CO2 SERPL-SCNC: 29 MMOL/L (ref 21–32)
CREAT BLD-MCNC: 0.68 MG/DL
DEPRECATED RDW RBC AUTO: 40.7 FL (ref 35.1–46.3)
EGFRCR SERPLBLD CKD-EPI 2021: 108 ML/MIN/1.73M2 (ref 60–?)
EOSINOPHIL # BLD AUTO: 0.15 X10(3) UL (ref 0–0.7)
EOSINOPHIL NFR BLD AUTO: 4 %
ERYTHROCYTE [DISTWIDTH] IN BLOOD BY AUTOMATED COUNT: 11.2 % (ref 11–15)
EST. AVERAGE GLUCOSE BLD GHB EST-MCNC: 114 MG/DL (ref 68–126)
FASTING PATIENT LIPID ANSWER: YES
FASTING STATUS PATIENT QL REPORTED: YES
GLOBULIN PLAS-MCNC: 3.4 G/DL (ref 2–3.5)
GLUCOSE BLD-MCNC: 85 MG/DL (ref 70–99)
GLUCOSE UR-MCNC: NORMAL MG/DL
HBA1C MFR BLD: 5.6 % (ref ?–5.7)
HCT VFR BLD AUTO: 40.7 %
HDLC SERPL-MCNC: 60 MG/DL (ref 40–59)
HGB BLD-MCNC: 13.5 G/DL
HGB UR QL STRIP.AUTO: NEGATIVE
IMM GRANULOCYTES # BLD AUTO: 0 X10(3) UL (ref 0–1)
IMM GRANULOCYTES NFR BLD: 0 %
KETONES UR-MCNC: NEGATIVE MG/DL
LDLC SERPL CALC-MCNC: 170 MG/DL (ref ?–100)
LEUKOCYTE ESTERASE UR QL STRIP.AUTO: 75
LYMPHOCYTES # BLD AUTO: 1.01 X10(3) UL (ref 1–4)
LYMPHOCYTES NFR BLD AUTO: 27.2 %
MCH RBC QN AUTO: 33 PG (ref 26–34)
MCHC RBC AUTO-ENTMCNC: 33.2 G/DL (ref 31–37)
MCV RBC AUTO: 99.5 FL
MONOCYTES # BLD AUTO: 0.31 X10(3) UL (ref 0.1–1)
MONOCYTES NFR BLD AUTO: 8.3 %
NEUTROPHILS # BLD AUTO: 2.21 X10 (3) UL (ref 1.5–7.7)
NEUTROPHILS # BLD AUTO: 2.21 X10(3) UL (ref 1.5–7.7)
NEUTROPHILS NFR BLD AUTO: 59.4 %
NITRITE UR QL STRIP.AUTO: NEGATIVE
NONHDLC SERPL-MCNC: 216 MG/DL (ref ?–130)
OSMOLALITY SERPL CALC.SUM OF ELEC: 292 MOSM/KG (ref 275–295)
PH UR: 6 [PH] (ref 5–8)
PLATELET # BLD AUTO: 304 10(3)UL (ref 150–450)
POTASSIUM SERPL-SCNC: 4.1 MMOL/L (ref 3.5–5.1)
PROT SERPL-MCNC: 8.1 G/DL (ref 5.7–8.2)
RBC # BLD AUTO: 4.09 X10(6)UL
SODIUM SERPL-SCNC: 142 MMOL/L (ref 136–145)
SP GR UR STRIP: 1.02 (ref 1–1.03)
TRIGL SERPL-MCNC: 246 MG/DL (ref 30–149)
TSI SER-ACNC: 1.88 MIU/ML (ref 0.55–4.78)
UROBILINOGEN UR STRIP-ACNC: NORMAL
VIT B12 SERPL-MCNC: 631 PG/ML (ref 211–911)
VIT D+METAB SERPL-MCNC: 28.9 NG/ML (ref 30–100)
VLDLC SERPL CALC-MCNC: 50 MG/DL (ref 0–30)
WBC # BLD AUTO: 3.7 X10(3) UL (ref 4–11)

## 2024-05-29 PROCEDURE — 36415 COLL VENOUS BLD VENIPUNCTURE: CPT

## 2024-05-29 PROCEDURE — 85025 COMPLETE CBC W/AUTO DIFF WBC: CPT

## 2024-05-29 PROCEDURE — 81001 URINALYSIS AUTO W/SCOPE: CPT

## 2024-05-29 PROCEDURE — 83036 HEMOGLOBIN GLYCOSYLATED A1C: CPT

## 2024-05-29 PROCEDURE — 80061 LIPID PANEL: CPT

## 2024-05-29 PROCEDURE — 82306 VITAMIN D 25 HYDROXY: CPT

## 2024-05-29 PROCEDURE — 84443 ASSAY THYROID STIM HORMONE: CPT

## 2024-05-29 PROCEDURE — 82607 VITAMIN B-12: CPT

## 2024-05-29 PROCEDURE — 80053 COMPREHEN METABOLIC PANEL: CPT

## 2024-06-01 ENCOUNTER — APPOINTMENT (OUTPATIENT)
Dept: RADIATION ONCOLOGY | Facility: HOSPITAL | Age: 49
End: 2024-06-01
Attending: INTERNAL MEDICINE
Payer: COMMERCIAL

## 2024-06-04 ENCOUNTER — TELEPHONE (OUTPATIENT)
Dept: SURGERY | Facility: CLINIC | Age: 49
End: 2024-06-04

## 2024-06-04 ENCOUNTER — ORDER TRANSCRIPTION (OUTPATIENT)
Dept: ADMINISTRATIVE | Facility: HOSPITAL | Age: 49
End: 2024-06-04

## 2024-06-04 DIAGNOSIS — E78.2 MIXED HYPERLIPIDEMIA: Primary | ICD-10-CM

## 2024-06-04 NOTE — TELEPHONE ENCOUNTER
Called and spoke with patient regarding timing of mammogram.  She should have her mammogram 4 months after she completes radiation.  Patient verbalized understanding.

## 2024-06-05 ENCOUNTER — HOSPITAL ENCOUNTER (OUTPATIENT)
Dept: CT IMAGING | Age: 49
Discharge: HOME | End: 2024-06-05
Attending: FAMILY MEDICINE

## 2024-06-05 DIAGNOSIS — E78.2 MIXED HYPERLIPIDEMIA: ICD-10-CM

## 2024-06-05 DIAGNOSIS — Z13.9 ENCOUNTER FOR SCREENING: ICD-10-CM

## 2024-06-10 ENCOUNTER — APPOINTMENT (OUTPATIENT)
Dept: RADIATION ONCOLOGY | Facility: HOSPITAL | Age: 49
End: 2024-06-10
Attending: INTERNAL MEDICINE
Payer: COMMERCIAL

## 2024-06-10 PROCEDURE — 77412 RADIATION TX DELIVERY LVL 3: CPT | Performed by: INTERNAL MEDICINE

## 2024-06-10 PROCEDURE — 77280 THER RAD SIMULAJ FIELD SMPL: CPT | Performed by: INTERNAL MEDICINE

## 2024-06-11 ENCOUNTER — APPOINTMENT (OUTPATIENT)
Dept: RADIATION ONCOLOGY | Facility: HOSPITAL | Age: 49
End: 2024-06-11
Attending: INTERNAL MEDICINE
Payer: COMMERCIAL

## 2024-06-11 PROCEDURE — 77280 THER RAD SIMULAJ FIELD SMPL: CPT | Performed by: INTERNAL MEDICINE

## 2024-06-11 PROCEDURE — 77412 RADIATION TX DELIVERY LVL 3: CPT | Performed by: INTERNAL MEDICINE

## 2024-06-11 PROCEDURE — 77300 RADIATION THERAPY DOSE PLAN: CPT | Performed by: INTERNAL MEDICINE

## 2024-06-12 PROCEDURE — 77412 RADIATION TX DELIVERY LVL 3: CPT | Performed by: INTERNAL MEDICINE

## 2024-06-12 PROCEDURE — 77387 GUIDANCE FOR RADJ TX DLVR: CPT | Performed by: INTERNAL MEDICINE

## 2024-06-12 NOTE — PROGRESS NOTES
[facility] Radiation Treatment Management Note 1-5    Patient:  Alisia Berg  Age:  48 year old  Visit Diagnosis:    1. Malignant neoplasm of left breast in female, estrogen receptor positive, unspecified site of breast (HCC)      Primary Rad/Onc:  Dr. Rosario Jose    Site Delivered Dose (cGy) Prescribed Dose (cGy) Fraction #   L Breast 800 5000 4/25   L  5000 4/25   L Breast Bst 0 1000 0/5     First treatment date:   6/10/2024  Concurrent chemotherapy:  None        5/28/2024    10:00 AM 5/28/2024    10:15 AM 6/13/2024     9:56 AM   Oncology Vitals   /66 120/77 110/77   Pulse 67 68 86   Resp 17  16   Temp   97.6 °F (36.4 °C)   SpO2 97 % 98 % 96 %   Pain Score   0        Toxicities:  Fatigue Grade 1= Fatigue relieved by rest  Pruritis Grade 0= None  Dry Skin absent  Dermatitis associated with radiation Grade 0= None  Moisturizer used Mometasone applied Number of times: 2 times daily. Discussed moisturizer use.   Hyperpigmentation absent    Nursing Note:  Pt seen for OTV with Dr. Jose.    Shruthi DEXTER, RN    Physician Note:  Subjective:  -new start, doing well      Objective:  Vitals noted  ECOG 1  NAD  No skin changes      Treatment setup imaging have been reviewed:  Yes    Assessment/Plan:  -cont RT per plan  -mometasone and moisturizer bid    We discussed expected future toxicity. All questions answered.  Next OTV 1 week    Rosario Jose MD

## 2024-06-13 ENCOUNTER — SOCIAL WORK SERVICES (OUTPATIENT)
Dept: HEMATOLOGY/ONCOLOGY | Facility: HOSPITAL | Age: 49
End: 2024-06-13

## 2024-06-13 ENCOUNTER — OFFICE VISIT (OUTPATIENT)
Dept: RADIATION ONCOLOGY | Facility: HOSPITAL | Age: 49
End: 2024-06-13
Attending: INTERNAL MEDICINE
Payer: COMMERCIAL

## 2024-06-13 VITALS
DIASTOLIC BLOOD PRESSURE: 77 MMHG | RESPIRATION RATE: 16 BRPM | TEMPERATURE: 98 F | SYSTOLIC BLOOD PRESSURE: 110 MMHG | OXYGEN SATURATION: 96 % | HEART RATE: 86 BPM

## 2024-06-13 DIAGNOSIS — Z17.0 MALIGNANT NEOPLASM OF LEFT BREAST IN FEMALE, ESTROGEN RECEPTOR POSITIVE, UNSPECIFIED SITE OF BREAST (HCC): Primary | ICD-10-CM

## 2024-06-13 DIAGNOSIS — C50.912 MALIGNANT NEOPLASM OF LEFT BREAST IN FEMALE, ESTROGEN RECEPTOR POSITIVE, UNSPECIFIED SITE OF BREAST (HCC): Primary | ICD-10-CM

## 2024-06-13 PROCEDURE — 77412 RADIATION TX DELIVERY LVL 3: CPT | Performed by: INTERNAL MEDICINE

## 2024-06-13 PROCEDURE — 77387 GUIDANCE FOR RADJ TX DLVR: CPT | Performed by: INTERNAL MEDICINE

## 2024-06-13 NOTE — PROGRESS NOTES
SW attempted to contact patient by way of phone call to answer questions related to ST/LT disability. Patient did not answer. VERITO LVM requesting a call back.

## 2024-06-14 PROCEDURE — 77387 GUIDANCE FOR RADJ TX DLVR: CPT | Performed by: INTERNAL MEDICINE

## 2024-06-14 PROCEDURE — 77336 RADIATION PHYSICS CONSULT: CPT | Performed by: INTERNAL MEDICINE

## 2024-06-14 PROCEDURE — 77412 RADIATION TX DELIVERY LVL 3: CPT | Performed by: INTERNAL MEDICINE

## 2024-06-17 PROCEDURE — 77412 RADIATION TX DELIVERY LVL 3: CPT | Performed by: INTERNAL MEDICINE

## 2024-06-17 PROCEDURE — 77387 GUIDANCE FOR RADJ TX DLVR: CPT | Performed by: INTERNAL MEDICINE

## 2024-06-18 PROCEDURE — 77412 RADIATION TX DELIVERY LVL 3: CPT | Performed by: INTERNAL MEDICINE

## 2024-06-18 PROCEDURE — 77387 GUIDANCE FOR RADJ TX DLVR: CPT | Performed by: INTERNAL MEDICINE

## 2024-06-19 ENCOUNTER — SOCIAL WORK SERVICES (OUTPATIENT)
Dept: HEMATOLOGY/ONCOLOGY | Facility: HOSPITAL | Age: 49
End: 2024-06-19

## 2024-06-19 ENCOUNTER — OFFICE VISIT (OUTPATIENT)
Dept: RADIATION ONCOLOGY | Facility: HOSPITAL | Age: 49
End: 2024-06-19
Attending: INTERNAL MEDICINE
Payer: COMMERCIAL

## 2024-06-19 VITALS
HEART RATE: 80 BPM | RESPIRATION RATE: 18 BRPM | TEMPERATURE: 98 F | DIASTOLIC BLOOD PRESSURE: 67 MMHG | OXYGEN SATURATION: 97 % | SYSTOLIC BLOOD PRESSURE: 118 MMHG

## 2024-06-19 DIAGNOSIS — Z17.0 MALIGNANT NEOPLASM OF LEFT BREAST IN FEMALE, ESTROGEN RECEPTOR POSITIVE, UNSPECIFIED SITE OF BREAST (HCC): Primary | ICD-10-CM

## 2024-06-19 DIAGNOSIS — C50.912 MALIGNANT NEOPLASM OF LEFT BREAST IN FEMALE, ESTROGEN RECEPTOR POSITIVE, UNSPECIFIED SITE OF BREAST (HCC): Primary | ICD-10-CM

## 2024-06-19 PROCEDURE — 77387 GUIDANCE FOR RADJ TX DLVR: CPT | Performed by: INTERNAL MEDICINE

## 2024-06-19 PROCEDURE — 77412 RADIATION TX DELIVERY LVL 3: CPT | Performed by: INTERNAL MEDICINE

## 2024-06-19 NOTE — PROGRESS NOTES
VERITO called and spoke with patient regarding questions pertaining to her disability leave. Patient is going to be due back to work and she had questions about option to return to work with accommodations vs staying off work and continuing with LT disability. SW prompted patient to contact her employer's HR and inquire about reasonable accommodations to extend her leave until she has completed treatment. Patient states she will call her HR department to get clarity on what her options are.

## 2024-06-20 ENCOUNTER — APPOINTMENT (OUTPATIENT)
Dept: RADIATION ONCOLOGY | Facility: HOSPITAL | Age: 49
End: 2024-06-20
Payer: COMMERCIAL

## 2024-06-20 PROCEDURE — 77412 RADIATION TX DELIVERY LVL 3: CPT | Performed by: INTERNAL MEDICINE

## 2024-06-20 PROCEDURE — 77307 TELETHX ISODOSE PLAN CPLX: CPT | Performed by: INTERNAL MEDICINE

## 2024-06-20 PROCEDURE — 77290 THER RAD SIMULAJ FIELD CPLX: CPT | Performed by: INTERNAL MEDICINE

## 2024-06-20 PROCEDURE — 77334 RADIATION TREATMENT AID(S): CPT | Performed by: INTERNAL MEDICINE

## 2024-06-21 PROCEDURE — 77336 RADIATION PHYSICS CONSULT: CPT | Performed by: INTERNAL MEDICINE

## 2024-06-21 PROCEDURE — 77412 RADIATION TX DELIVERY LVL 3: CPT | Performed by: INTERNAL MEDICINE

## 2024-06-21 PROCEDURE — 77387 GUIDANCE FOR RADJ TX DLVR: CPT | Performed by: INTERNAL MEDICINE

## 2024-06-24 ENCOUNTER — TELEPHONE (OUTPATIENT)
Dept: PHYSICAL THERAPY | Facility: HOSPITAL | Age: 49
End: 2024-06-24

## 2024-06-24 PROCEDURE — 77387 GUIDANCE FOR RADJ TX DLVR: CPT | Performed by: INTERNAL MEDICINE

## 2024-06-24 PROCEDURE — 77412 RADIATION TX DELIVERY LVL 3: CPT | Performed by: INTERNAL MEDICINE

## 2024-06-25 ENCOUNTER — OFFICE VISIT (OUTPATIENT)
Dept: OCCUPATIONAL MEDICINE | Facility: HOSPITAL | Age: 49
End: 2024-06-25
Attending: INTERNAL MEDICINE

## 2024-06-25 DIAGNOSIS — G62.0 CHEMOTHERAPY-INDUCED PERIPHERAL NEUROPATHY (HCC): Primary | ICD-10-CM

## 2024-06-25 DIAGNOSIS — T45.1X5A CHEMOTHERAPY-INDUCED PERIPHERAL NEUROPATHY (HCC): Primary | ICD-10-CM

## 2024-06-25 PROCEDURE — 97165 OT EVAL LOW COMPLEX 30 MIN: CPT

## 2024-06-25 PROCEDURE — 77387 GUIDANCE FOR RADJ TX DLVR: CPT | Performed by: INTERNAL MEDICINE

## 2024-06-25 PROCEDURE — 97110 THERAPEUTIC EXERCISES: CPT

## 2024-06-25 PROCEDURE — 77412 RADIATION TX DELIVERY LVL 3: CPT | Performed by: INTERNAL MEDICINE

## 2024-06-25 NOTE — PROGRESS NOTES
OCCUPATIONAL THERAPY NEUROLOGICAL EVALUATION     Diagnosis:   Chemotherapy-induced peripheral neuropathy (HCC) (G62.0,T45.1X5A)       Referring Provider: Montrell Heaton  Date of Evaluation:    6/25/2024    Precautions:   HTN Next MD visit:   none scheduled  Date of Surgery: n/a     PATIENT SUMMARY   Alisia Berg is a 49 year old female who presents to therapy today with complaints of numbness and tingling in her bilateral hands.  Hx of Condition: Pt has been having numbness and tingling for about 6 months. Symptoms occurred post chemotherapy. status post left-sided lumpectomy with axillary lymph node dissection on 11/6/2023.   Pt describes pain level current 5/10, at best 3/10, at worst 5/10.   Current functional limitations include dropping items, difficulty cutting food, opening jars and bottles.    Alisia describes prior level of function independent.   Employment:  On short term disability  Hand Dominance: right  Living Situation: Family  Pt goals include \"to not feeling pain in my arm neck and fingers.\"  Past medical history was reviewed with Alisia. Significant findings include  has a past medical history of High blood pressure, Hypertension, and Trigeminal neuralgia of left side of face.     ASSESSMENT  Alisia presents to occupational therapy evaluation with primary c/o numbness and tingling in her bilateral hands. The results of the objective tests and measures show deficits in sensation, bilateral shoulder and wrist mild weakness, , and pinch strength. [place] Lizbet testing performed to bilateral volar tips of fingers. Testing identified deficits most notable in pt's bilateral thumb, index, and middle finger. Functional deficits include but are not limited to dropping items, difficulty cutting food, and opening jars and bottles. Signs and symptoms are consistent with diagnosis of Chemotherapy-induced peripheral neuropathy. Pt and OT discussed evaluation findings, pathology, POC and HEP.  Pt  voiced understanding and performs HEP correctly without reported pain. Skilled Occupational Therapy is medically necessary to address the above impairments and reach functional goals.     OBJECTIVE    OBSERVATION: Unremarkable     ORTHOTICS: none    SCAR:  none      SENSORY: Numbness: Yes, bilateral hands only volar finger tips, [place] Lizbet Completed: See attached sheet and deficits, and     Thumb Index Middle Ring Small    Normal 1.65-2.83             Diminished Light Touch 3.22-3.61    R/L R   Diminished Protective Sensation 4.56-4.93 R (4.31)  L (4.31) R (4.31)  L (4.31) R (4.31), L (4.31)  L (4.31)   Loss of Protective Sensation 5.07-5.88        Loss of Protective Sensation/Deep Pressure Sensation only 6.10-6.65             ROM: (* denotes performed with pain)  Shoulder  Elbow Wrist   WNL WNL WNL       MANUAL MUSCLE TESTING: (* denotes performed with pain)   Shoulder Elbow Wrist   Flexion: R 5/5; L 3+/5  Abduction: R 4+/5; L 3+/5   Flexion: R 5/5; L 4+/5  Extension: R 5/5; L 5/5  Supination: R 5/5; L 5/5  Pronation: R 5/5; L 5/5  Flexion: R 4+/5, L 4+/5  Extension: R 4+/5, L 3+/5          Strength (lbs) Right Average Left Average   : 28 22   2 pt Pinch: 3 2   3 pt Pinch: 3 4   Lateral Pinch: 4 4     Coordination:   9 Hole Peg Test: Right hand- 48 seconds, Left Hand- 38 seconds    Today’s Treatment and Response:   Pt education was provided on exam findings, treatment diagnosis, treatment plan, expectations, and prognosis. Pt was also provided recommendations for sensory precautions and safety   Patient was instructed in and issued a HEP for: Tendon glides and opposition    Charges: OT Eval: Low Complexity, TE 1      Total Timed Treatment: 12 min     Total Treatment Time: 45 min     Based on clinical rationale and outcome measures, this evaluation involved Low Complexity decision making due to 1-2 personal factors/comorbidities, 3 body structures involved/activity limitations, and evolving symptoms  including changing pain levels and increasing numbness and tingling.  PLAN OF CARE   Goals: (to be met in 8 visits)  Pt will be independent and compliant with comprehensive HEP to maintain progress achieved in OT  Pt will decrease their (bilateral) 9-hole peg completion time by 8-10 seconds for ease of fine motor manipulation during dressing tasks  Pt will increase their (L) wrist extension MMT score to at least 4+/5 for ease opening doors  Pt will increase their (Bilateral)  strength by 10# for ease of carrying groceries  Pt will increase their (Bilateral) pinch strength scores by 3-5# for ease of opening bottles and jars.  Pt will decrease their QuickDASH score by 10% in order to demo improvements in functional independence.     Frequency / Duration: Patient will be seen for 2x/week or a total of 8 visits over a 90 day period.  Treatment will include: Manual Therapy, Soft tissue mobilization, AROM, PROM, Strengthening, Therapeutic Activity, Moist heat, cryotherapy, Ultrasound, Whirlpool (fluidotherapy), Paraffin, Electrical Stim, Orthosis,  Neuromuscular Re-education, Patient education, Home exercise program,        Education or treatment limitation: None  Rehab Potential:good    QuickDASH Outcome Score  Score: 72.73 % (6/18/2024  9:12 PM)      Patient/Family/Caregiver was advised of these findings, precautions, and treatment options and has agreed to actively participate in planning and for this course of care.    Thank you for your referral. Please co-sign or sign and return this letter via fax as soon as possible to [phone]. If you have any questions, please contact me at Dept: [phone]    Sincerely,  Electronically signed by therapist: Cristal Andrade, OT  Physician's certification required: Yes  I certify the need for these services furnished under this plan of treatment and while under my care.    X___________________________________________________ Date____________________    Certification From:  6/25/2024  To:9/23/2024

## 2024-06-26 ENCOUNTER — TELEPHONE (OUTPATIENT)
Dept: RADIATION ONCOLOGY | Facility: HOSPITAL | Age: 49
End: 2024-06-26

## 2024-06-26 PROCEDURE — 77412 RADIATION TX DELIVERY LVL 3: CPT | Performed by: INTERNAL MEDICINE

## 2024-06-26 PROCEDURE — 77387 GUIDANCE FOR RADJ TX DLVR: CPT | Performed by: INTERNAL MEDICINE

## 2024-06-26 NOTE — PROGRESS NOTES
[facility] Radiation Treatment Management Note 11-15    Patient:  Alisia Berg  Age:  49 year old  Visit Diagnosis:    1. Malignant neoplasm of left breast in female, estrogen receptor positive, unspecified site of breast (HCC)      Primary Rad/Onc:  Dr. Rosario Jose    Site Delivered Dose (cGy) Prescribed Dose (cGy) Fraction #   L SCF 2800 5000 14/25   L breast 2800 5000 14/25   L breast boost 0 1000 0/5     First treatment date:   6/10/24  Concurrent chemotherapy:  none        6/13/2024     9:56 AM 6/19/2024     9:48 AM 6/27/2024    10:05 AM   Oncology Vitals   /77 118/67 112/62   Pulse 86 80 67   Resp 16 18 16   Temp 97.6 °F (36.4 °C) 97.6 °F (36.4 °C) 97.8 °F (36.6 °C)   SpO2 96 % 97 % 98 %   Pain Score 0 0 0        Toxicities:  Fatigue Grade 1= Fatigue relieved by rest  Pruritis Grade 1= Mild or localized; topical intervention indicated- occasional  Dry Skin absent  Dermatitis associated with radiation Grade 0= None  Moisturizer used Vanicream and Mometasone applied Number of times: 2 times daily.  Hyperpigmentation absent    Nursing Note:  Patient seen for OTV with Dr. Jose.  Some itchiness near armpit, applies vanicream and mometasone and goes away.    Yamilex CRISTINA RN    Physician Note:  Subjective:  -doing well, using skin care, mild itching  -started OT for PN      Objective:  Vitals noted  ECOG 0  NAD  Minimal skin changes      Treatment setup imaging have been reviewed:  Yes    Assessment/Plan:  -cont RT and skin care    We discussed expected future toxicity. All questions answered.  Next OTV 1 week    Rosario Jose MD

## 2024-06-26 NOTE — TELEPHONE ENCOUNTER
Received long term disability forms in forms department with valid auth attached. Logged for processing.

## 2024-06-27 ENCOUNTER — APPOINTMENT (OUTPATIENT)
Dept: RADIATION ONCOLOGY | Facility: HOSPITAL | Age: 49
End: 2024-06-27
Payer: COMMERCIAL

## 2024-06-27 ENCOUNTER — OFFICE VISIT (OUTPATIENT)
Dept: OCCUPATIONAL MEDICINE | Facility: HOSPITAL | Age: 49
End: 2024-06-27
Attending: INTERNAL MEDICINE

## 2024-06-27 ENCOUNTER — OFFICE VISIT (OUTPATIENT)
Dept: RADIATION ONCOLOGY | Facility: HOSPITAL | Age: 49
End: 2024-06-27
Attending: INTERNAL MEDICINE
Payer: COMMERCIAL

## 2024-06-27 VITALS
SYSTOLIC BLOOD PRESSURE: 112 MMHG | RESPIRATION RATE: 16 BRPM | OXYGEN SATURATION: 98 % | TEMPERATURE: 98 F | DIASTOLIC BLOOD PRESSURE: 62 MMHG | HEART RATE: 67 BPM

## 2024-06-27 DIAGNOSIS — Z17.0 MALIGNANT NEOPLASM OF LEFT BREAST IN FEMALE, ESTROGEN RECEPTOR POSITIVE, UNSPECIFIED SITE OF BREAST (HCC): Primary | ICD-10-CM

## 2024-06-27 DIAGNOSIS — C50.912 MALIGNANT NEOPLASM OF LEFT BREAST IN FEMALE, ESTROGEN RECEPTOR POSITIVE, UNSPECIFIED SITE OF BREAST (HCC): Primary | ICD-10-CM

## 2024-06-27 PROCEDURE — 77387 GUIDANCE FOR RADJ TX DLVR: CPT | Performed by: INTERNAL MEDICINE

## 2024-06-27 PROCEDURE — 77412 RADIATION TX DELIVERY LVL 3: CPT | Performed by: INTERNAL MEDICINE

## 2024-06-27 PROCEDURE — 97110 THERAPEUTIC EXERCISES: CPT

## 2024-06-27 NOTE — PROGRESS NOTES
Diagnosis:   Chemotherapy-induced peripheral neuropathy (HCC) (G62.0,T45.1X5A)        Referring Provider: Montrell Heaton  Date of Evaluation:    6/25/2024    Precautions:   HTN Next MD visit:   none scheduled  Date of Surgery: n/a   Insurance Primary/Secondary: BCBS OUT OF STATE / N/A     # Auth Visits: 5 visit 5/15-9/15            Subjective: Pt reports numbness is the same. Rates at 7/10    Pain: 0/10      Objective: See exercises flowsheet below for exercises and activities performed today. All exercises performed bilaterally      Assessment: Pt demo'd great carry over of her assigned HEP as evidenced by no v/c needed for accuracy. While performing exercises, pt verbalized fingers feeling warmer d/t gripping. OT educated pt on body mechanics specifically using larger items to decrease gripping and stress on joints and carpal tunnel. OT trained and demo'd hand rubber band exercises to patient, pt returned demo with min v/c until able to display independence. Rubber band exercises added to HEP.       Goals:  (to be met in 5 visits, 8 visits were requested)  Pt will be independent and compliant with comprehensive HEP to maintain progress achieved in OT  Pt will decrease their (bilateral) 9-hole peg completion time by 8-10 seconds for ease of fine motor manipulation during dressing tasks  Pt will increase their (L) wrist extension MMT score to at least 4+/5 for ease opening doors  Pt will increase their (Bilateral)  strength by 10# for ease of carrying groceries  Pt will increase their (Bilateral) pinch strength scores by 3-5# for ease of opening bottles and jars.  Pt will decrease their QuickDASH score by 10% in order to demo improvements in functional independence.     Plan: continue per goals  Date 6/27/2024                  Visit # 2/5                                    Evaluation                 Manual                                                 Ther ex                                  Forearm  flexor/extensor stretch x3, 20 secs    Red web ex: 10x2  -/twist  -lumbrical squeeze    Towel scrunches x10    Tendon glides x10    Rubber band ex:  -digital abduction  -lumbrical extension    2# wrist flex/extend 10x2    Red flexbar 10# bilateral and ipsilateral sup/pronation   10x2  \"T\" bends with flexbar 5x1                 HEP instruction                    Therapeutic Activity                                                 Neuromuscular Re-education                                                 Modalities                                                HEP:  Assignment date:   Tendon glides and opposition 6/25/24                  Charges: TE 3        Total Timed Treatment: 42 min  Total Treatment Time: 42 min

## 2024-06-28 PROCEDURE — 77387 GUIDANCE FOR RADJ TX DLVR: CPT | Performed by: INTERNAL MEDICINE

## 2024-06-28 PROCEDURE — 77336 RADIATION PHYSICS CONSULT: CPT | Performed by: INTERNAL MEDICINE

## 2024-06-28 PROCEDURE — 77412 RADIATION TX DELIVERY LVL 3: CPT | Performed by: INTERNAL MEDICINE

## 2024-07-01 ENCOUNTER — APPOINTMENT (OUTPATIENT)
Dept: RADIATION ONCOLOGY | Facility: HOSPITAL | Age: 49
End: 2024-07-01
Attending: INTERNAL MEDICINE
Payer: COMMERCIAL

## 2024-07-01 PROCEDURE — 77412 RADIATION TX DELIVERY LVL 3: CPT | Performed by: INTERNAL MEDICINE

## 2024-07-01 PROCEDURE — 77387 GUIDANCE FOR RADJ TX DLVR: CPT | Performed by: INTERNAL MEDICINE

## 2024-07-02 PROCEDURE — 77387 GUIDANCE FOR RADJ TX DLVR: CPT | Performed by: INTERNAL MEDICINE

## 2024-07-02 PROCEDURE — 77412 RADIATION TX DELIVERY LVL 3: CPT | Performed by: INTERNAL MEDICINE

## 2024-07-02 NOTE — TELEPHONE ENCOUNTER
Yes, if I can send them to you via email to obtain a Hand signature that would be awesome!    Thanks,   Cristin RUSS

## 2024-07-02 NOTE — TELEPHONE ENCOUNTER
Perfect, Thank you so much Sussy! I've just sent them and yes when signed if they can just emailed back to me we will complete processing on this end. You Rock!    Thanks,   Cristin RUSS

## 2024-07-02 NOTE — PROGRESS NOTES
[facility] Radiation Treatment Management Note 16-20    Patient:  Alisia eBrg  Age:  49 year old  Visit Diagnosis:    1. Malignant neoplasm of left breast in female, estrogen receptor positive, unspecified site of breast (HCC)      Primary Rad/Onc:  Dr. Rosario Jose    Site Delivered Dose (cGy) Prescribed Dose (cGy) Fraction #   L breast 3600 5000 18/25   L SCF 3600 5000 18/25   L breast bst 0 1000 0/5     First treatment date:   6/10/24  Concurrent chemotherapy:  none        6/19/2024     9:48 AM 6/27/2024    10:05 AM 7/3/2024    10:09 AM   Oncology Vitals   /67 112/62 123/59   Pulse 80 67 73   Resp 18 16 18   Temp 97.6 °F (36.4 °C) 97.8 °F (36.6 °C) 97.5 °F (36.4 °C)   SpO2 97 % 98 % 98 %   Pain Score 0 0 0        Toxicities:  Fatigue Grade 1= Fatigue relieved by rest  Pruritis Grade 0= None- per patient, has resolved from last week  Dry Skin absent  Dermatitis associated with radiation Grade 1= Faint erythema or dry desquamation  Moisturizer used Vanicream and Mometasone applied Number of times: 2 times daily.  Hyperpigmentation noted- mild      Nursing Note:  Patient seen for OTV with Dr. Jose.   Offered , patient declined.  Some tenderness to left breast- does not feel tylenol is needed.  Patient reports left upper back pain and burning after radiation on Friday, self resolved. Denies pain currently.  Applying cream BID to breast and back.     Yamilex CRISTINA RN    Physician Note:  Subjective:  -doing well, using skin care, no focal complaints today      Objective:  Vitals noted  ECOG 0  NAD  Grade 1 dermatitis      Treatment setup imaging have been reviewed:  Yes    Assessment/Plan:  -cont RT and skin care     We discussed expected future toxicity. All questions answered.  Next OTV 1 week     Rosario Jose MD

## 2024-07-02 NOTE — TELEPHONE ENCOUNTER
Two Disability forms received and are being processed, due to some issus w/ the provider not being listed yet in the scanning system, forms will be sent to MD via email through a liaison to obtain a hand signature in the interim.

## 2024-07-03 ENCOUNTER — OFFICE VISIT (OUTPATIENT)
Dept: RADIATION ONCOLOGY | Facility: HOSPITAL | Age: 49
End: 2024-07-03
Attending: INTERNAL MEDICINE
Payer: COMMERCIAL

## 2024-07-03 VITALS
HEART RATE: 73 BPM | TEMPERATURE: 98 F | RESPIRATION RATE: 18 BRPM | SYSTOLIC BLOOD PRESSURE: 123 MMHG | DIASTOLIC BLOOD PRESSURE: 59 MMHG | OXYGEN SATURATION: 98 %

## 2024-07-03 DIAGNOSIS — Z17.0 MALIGNANT NEOPLASM OF LEFT BREAST IN FEMALE, ESTROGEN RECEPTOR POSITIVE, UNSPECIFIED SITE OF BREAST (HCC): Primary | ICD-10-CM

## 2024-07-03 DIAGNOSIS — C50.912 MALIGNANT NEOPLASM OF LEFT BREAST IN FEMALE, ESTROGEN RECEPTOR POSITIVE, UNSPECIFIED SITE OF BREAST (HCC): Primary | ICD-10-CM

## 2024-07-03 PROCEDURE — 77412 RADIATION TX DELIVERY LVL 3: CPT | Performed by: INTERNAL MEDICINE

## 2024-07-03 PROCEDURE — 77387 GUIDANCE FOR RADJ TX DLVR: CPT | Performed by: INTERNAL MEDICINE

## 2024-07-03 NOTE — TELEPHONE ENCOUNTER
Disability forms faxed to [employer] @ [phone], confirmation received, sending MyChart to adv patient. Archiving forms.

## 2024-07-03 NOTE — TELEPHONE ENCOUNTER
Received signed forms from Nurse in office via email, scanned into this encounter and faxed to [employer] for patient, pending confirmation

## 2024-07-05 PROCEDURE — 77336 RADIATION PHYSICS CONSULT: CPT | Performed by: INTERNAL MEDICINE

## 2024-07-05 PROCEDURE — 77387 GUIDANCE FOR RADJ TX DLVR: CPT | Performed by: INTERNAL MEDICINE

## 2024-07-05 PROCEDURE — 77412 RADIATION TX DELIVERY LVL 3: CPT | Performed by: INTERNAL MEDICINE

## 2024-07-08 PROCEDURE — 77387 GUIDANCE FOR RADJ TX DLVR: CPT | Performed by: INTERNAL MEDICINE

## 2024-07-08 PROCEDURE — 77412 RADIATION TX DELIVERY LVL 3: CPT | Performed by: INTERNAL MEDICINE

## 2024-07-09 ENCOUNTER — OFFICE VISIT (OUTPATIENT)
Dept: OCCUPATIONAL MEDICINE | Facility: HOSPITAL | Age: 49
End: 2024-07-09
Attending: INTERNAL MEDICINE
Payer: COMMERCIAL

## 2024-07-09 ENCOUNTER — LAB REQUISITION (OUTPATIENT)
Dept: LAB | Facility: HOSPITAL | Age: 49
End: 2024-07-09
Payer: COMMERCIAL

## 2024-07-09 DIAGNOSIS — I10 ESSENTIAL (PRIMARY) HYPERTENSION: ICD-10-CM

## 2024-07-09 DIAGNOSIS — G62.0 DRUG-INDUCED POLYNEUROPATHY (HCC): ICD-10-CM

## 2024-07-09 DIAGNOSIS — E78.2 MIXED HYPERLIPIDEMIA: ICD-10-CM

## 2024-07-09 DIAGNOSIS — R76.0 RAISED ANTIBODY TITER: ICD-10-CM

## 2024-07-09 DIAGNOSIS — G50.0 TRIGEMINAL NEURALGIA: ICD-10-CM

## 2024-07-09 LAB
BASOPHILS # BLD AUTO: 0.02 X10(3) UL (ref 0–0.2)
BASOPHILS NFR BLD AUTO: 0.7 %
CHOLEST SERPL-MCNC: 273 MG/DL (ref ?–200)
DEPRECATED RDW RBC AUTO: 42.1 FL (ref 35.1–46.3)
EOSINOPHIL # BLD AUTO: 0.09 X10(3) UL (ref 0–0.7)
EOSINOPHIL NFR BLD AUTO: 3 %
ERYTHROCYTE [DISTWIDTH] IN BLOOD BY AUTOMATED COUNT: 11.9 % (ref 11–15)
HCT VFR BLD AUTO: 37.1 %
HDLC SERPL-MCNC: 68 MG/DL (ref 40–59)
HGB BLD-MCNC: 12.9 G/DL
IMM GRANULOCYTES # BLD AUTO: 0 X10(3) UL (ref 0–1)
IMM GRANULOCYTES NFR BLD: 0 %
LDLC SERPL CALC-MCNC: 180 MG/DL (ref ?–100)
LYMPHOCYTES # BLD AUTO: 0.54 X10(3) UL (ref 1–4)
LYMPHOCYTES NFR BLD AUTO: 17.8 %
MCH RBC QN AUTO: 33.5 PG (ref 26–34)
MCHC RBC AUTO-ENTMCNC: 34.8 G/DL (ref 31–37)
MCV RBC AUTO: 96.4 FL
MONOCYTES # BLD AUTO: 0.36 X10(3) UL (ref 0.1–1)
MONOCYTES NFR BLD AUTO: 11.9 %
NEUTROPHILS # BLD AUTO: 2.02 X10 (3) UL (ref 1.5–7.7)
NEUTROPHILS # BLD AUTO: 2.02 X10(3) UL (ref 1.5–7.7)
NEUTROPHILS NFR BLD AUTO: 66.6 %
NONHDLC SERPL-MCNC: 205 MG/DL (ref ?–130)
PLATELET # BLD AUTO: 227 10(3)UL (ref 150–450)
RBC # BLD AUTO: 3.85 X10(6)UL
TRIGL SERPL-MCNC: 139 MG/DL (ref 30–149)
VLDLC SERPL CALC-MCNC: 28 MG/DL (ref 0–30)
WBC # BLD AUTO: 3 X10(3) UL (ref 4–11)

## 2024-07-09 PROCEDURE — 77412 RADIATION TX DELIVERY LVL 3: CPT | Performed by: INTERNAL MEDICINE

## 2024-07-09 PROCEDURE — 80061 LIPID PANEL: CPT

## 2024-07-09 PROCEDURE — 97110 THERAPEUTIC EXERCISES: CPT

## 2024-07-09 PROCEDURE — 77387 GUIDANCE FOR RADJ TX DLVR: CPT | Performed by: INTERNAL MEDICINE

## 2024-07-09 PROCEDURE — 85025 COMPLETE CBC W/AUTO DIFF WBC: CPT

## 2024-07-09 RX ORDER — MOMETASONE FUROATE 1 MG/G
CREAM TOPICAL
Qty: 50 G | Refills: 5 | OUTPATIENT
Start: 2024-07-09

## 2024-07-09 NOTE — PROGRESS NOTES
Diagnosis:   Chemotherapy-induced peripheral neuropathy (HCC) (G62.0,T45.1X5A)        Referring Provider: Montrell Heaton  Date of Evaluation:    6/25/2024    Precautions:   HTN Next MD visit:   none scheduled  Date of Surgery: n/a   Insurance Primary/Secondary: BCBS OUT OF STATE / N/A     # Auth Visits: 5 visit 5/15-9/15            Subjective: Pt reports numbness is the same. Sometimes feels hot and is all the time.      Pain/Numbness: 7/10      Objective: See exercises flowsheet below for exercises and activities performed today. All exercises performed bilaterally      Assessment: Pt's numbness appears to be primarily in her first 3 digits, therefore training in median nerve glides initiated. Once pt able to demo independence, median nerve glides added to HEP. During scapular retraction exercises, pt's symptoms of numbness increased with low trap strengthening. This increase in numbness may indicate an upper nerve entrapment, OT will continue to monitor symptoms.       Goals:  (to be met in 5 visits, 8 visits were requested)  Pt will be independent and compliant with comprehensive HEP to maintain progress achieved in OT  Pt will decrease their (bilateral) 9-hole peg completion time by 8-10 seconds for ease of fine motor manipulation during dressing tasks  Pt will increase their (L) wrist extension MMT score to at least 4+/5 for ease opening doors  Pt will increase their (Bilateral)  strength by 10# for ease of carrying groceries  Pt will increase their (Bilateral) pinch strength scores by 3-5# for ease of opening bottles and jars.  Pt will decrease their QuickDASH score by 10% in order to demo improvements in functional independence.     Plan: continue per goals  Date 6/27/2024 7/9/2024                Visit # 2/5  3/5                                  Evaluation                 Manual                                                 Ther ex                                  Forearm flexor/extensor stretch x3, 20  secs    Red web ex: 10x2  -/twist  -lumbrical squeeze    Towel scrunches x10    Tendon glides x10    Rubber band ex:  -digital abduction  -lumbrical extension    2# wrist flex/extend 10x2    Red flexbar 10# bilateral and ipsilateral sup/pronation   10x2  \"T\" bends with flexbar 5x1 Forearm flexor/extensor stretch x3, 20 secs    Wrist AROM flex/extension 20x1    Median nerve glides 10x1     Chest/pectoral stretch x3, 20 secs    Scapular Retraction 10x2  -Is and Ys    Red web ex: 10x2  -/twist  -lumbrical squeeze    Rubber band ex: 10x2  -digital abduction  -lumbrical extension    1# dowel sup/pronation 10x2    Red flexbar 10# bilateral sup/pronation  \"T\" bends with flexbar    10x2    Digi-flex 3# isolated digital flexion 10x1                   HEP instruction                    Therapeutic Activity                                                 Neuromuscular Re-education                                                 Modalities                                                HEP:  Assignment date:   Tendon glides and opposition 6/25/24   Rubber band digital extension and abduction 6/27/24              Charges: TE 3        Total Timed Treatment: 40 min  Total Treatment Time: 40 min

## 2024-07-10 PROCEDURE — 77412 RADIATION TX DELIVERY LVL 3: CPT | Performed by: INTERNAL MEDICINE

## 2024-07-10 PROCEDURE — 77387 GUIDANCE FOR RADJ TX DLVR: CPT | Performed by: INTERNAL MEDICINE

## 2024-07-10 NOTE — PROGRESS NOTES
[facility] Radiation Treatment Management Note 21-25    Patient:  Alisia Berg  Age:  49 year old  Visit Diagnosis:    1. Malignant neoplasm of left breast in female, estrogen receptor positive, unspecified site of breast (HCC)      Primary Rad/Onc:  Dr. Rosario Jose    Site Delivered Dose (cGy) Prescribed Dose (cGy) Fraction #   L SCF 4600 5000 23/25   L breast 4600 5000 23/25   L breast boost 0 1000 0/5     First treatment date:   6/10/24  Concurrent chemotherapy:  None        6/27/2024    10:05 AM 7/3/2024    10:09 AM 7/11/2024     3:37 PM   Oncology Vitals   /62 123/59 120/79   Pulse 67 73 78   Resp 16 18 16   Temp 97.8 °F (36.6 °C) 97.5 °F (36.4 °C) 97.5 °F (36.4 °C)   SpO2 98 % 98 % 99 %   Pain Score 0 0 0        Toxicities:  Fatigue Grade 1= Fatigue relieved by rest- per patient, improved form last week   Pruritis Grade 0= None  Dry Skin absent  Dermatitis associated with radiation Grade 1= Faint erythema or dry desquamation  Moisturizer used Vanicream and Mometasone applied Number of times: 2 times daily.  Hyperpigmentation noted    Nursing Note:  Patient seen for OTV with Dr. Jose.   Offered , patient declined.  Occasional pain to left breast. Self resolves.   Using moisturizer.    Almost out of mometasone    Yamilex CRISTINA RN    Physician Note:  Subjective:  -doing well, no major issues      Objective:  Vitals noted  ECOG 0  NAD  Grade 1 dermatitis      Treatment setup imaging have been reviewed:  Yes    Assessment/Plan:  -cont RT and skin care    We discussed expected future toxicity. All questions answered.  Next OTV 1 week    Rosario Jose MD

## 2024-07-11 ENCOUNTER — OFFICE VISIT (OUTPATIENT)
Dept: OCCUPATIONAL MEDICINE | Facility: HOSPITAL | Age: 49
End: 2024-07-11
Attending: INTERNAL MEDICINE
Payer: COMMERCIAL

## 2024-07-11 ENCOUNTER — OFFICE VISIT (OUTPATIENT)
Dept: RADIATION ONCOLOGY | Facility: HOSPITAL | Age: 49
End: 2024-07-11
Attending: INTERNAL MEDICINE
Payer: COMMERCIAL

## 2024-07-11 VITALS
HEART RATE: 78 BPM | TEMPERATURE: 98 F | RESPIRATION RATE: 16 BRPM | OXYGEN SATURATION: 99 % | DIASTOLIC BLOOD PRESSURE: 79 MMHG | SYSTOLIC BLOOD PRESSURE: 120 MMHG

## 2024-07-11 DIAGNOSIS — Z17.0 MALIGNANT NEOPLASM OF LEFT BREAST IN FEMALE, ESTROGEN RECEPTOR POSITIVE, UNSPECIFIED SITE OF BREAST (HCC): Primary | ICD-10-CM

## 2024-07-11 DIAGNOSIS — C50.912 MALIGNANT NEOPLASM OF LEFT BREAST IN FEMALE, ESTROGEN RECEPTOR POSITIVE, UNSPECIFIED SITE OF BREAST (HCC): Primary | ICD-10-CM

## 2024-07-11 PROCEDURE — 77412 RADIATION TX DELIVERY LVL 3: CPT | Performed by: INTERNAL MEDICINE

## 2024-07-11 PROCEDURE — 97110 THERAPEUTIC EXERCISES: CPT

## 2024-07-11 PROCEDURE — 77387 GUIDANCE FOR RADJ TX DLVR: CPT | Performed by: INTERNAL MEDICINE

## 2024-07-11 RX ORDER — MOMETASONE FUROATE 1 MG/G
CREAM TOPICAL
Qty: 45 G | Refills: 0 | Status: SHIPPED | OUTPATIENT
Start: 2024-07-11

## 2024-07-11 NOTE — PROGRESS NOTES
Diagnosis:   Chemotherapy-induced peripheral neuropathy (HCC) (G62.0,T45.1X5A)        Referring Provider: Montrell Heaton  Date of Evaluation:    6/25/2024    Precautions:   HTN Next MD visit:   none scheduled  Date of Surgery: n/a   Insurance Primary/Secondary: BCBS OUT OF STATE / N/A     # Auth Visits: 5 visit 5/15-9/15            Subjective: Pt reports feeling a burning and needles.     Pain/Numbness: 7/10      Objective: See exercises flowsheet below for exercises and activities performed today. All exercises performed bilaterally      Assessment: Numbness present with low trap strengthening however no increase like prior sessions. Pt attempted a few ulnar nerve glides to identify if these would decrease symptoms, no change with glides more improvement verbalized with median nerve glides. Theraputty exercises initiated for continued hand strengthening.     Goals:  (to be met in 5 visits, 8 visits were requested)  Pt will be independent and compliant with comprehensive HEP to maintain progress achieved in OT  Pt will decrease their (bilateral) 9-hole peg completion time by 8-10 seconds for ease of fine motor manipulation during dressing tasks  Pt will increase their (L) wrist extension MMT score to at least 4+/5 for ease opening doors  Pt will increase their (Bilateral)  strength by 10# for ease of carrying groceries  Pt will increase their (Bilateral) pinch strength scores by 3-5# for ease of opening bottles and jars.  Pt will decrease their QuickDASH score by 10% in order to demo improvements in functional independence.     Plan: continue per goals  Date 6/27/2024 7/9/2024 7/11/2024              Visit # 2/5  3/5  4/5                                Evaluation                 Manual                                                 Ther ex                                  Forearm flexor/extensor stretch x3, 20 secs    Red web ex: 10x2  -/twist  -lumbrical squeeze    Towel scrunches x10    Tendon glides  x10    Rubber band ex:  -digital abduction  -lumbrical extension    2# wrist flex/extend 10x2    Red flexbar 10# bilateral and ipsilateral sup/pronation   10x2  \"T\" bends with flexbar 5x1 Forearm flexor/extensor stretch x3, 20 secs    Wrist AROM flex/extension 20x1    Median nerve glides 10x1     Chest/pectoral stretch x3, 20 secs    Scapular Retraction 10x2  -Is and Ys    Red web ex: 10x2  -/twist  -lumbrical squeeze    Rubber band ex: 10x2  -digital abduction  -lumbrical extension    1# dowel sup/pronation 10x2    Red flexbar 10# bilateral sup/pronation  \"T\" bends with flexbar    10x2    Digi-flex 3# isolated digital flexion 10x1     Forearm flexor/extensor stretch x3, 20 secs    Median nerve glides 10x1     Scapular Retraction 10x2  -Is and Ys    Chest/pectoral stretch x3, 20 secs    Wall slides with yellow band 10x2    Yellow Putty Ex: 10x2  -  -lumbrical squeeze  -oppositional pinch  -adduction  -lumbrical extension    Blue  web ex: 10x2  -/twist    1# dowel sup/pronation 10x2                 HEP instruction                    Therapeutic Activity                                                 Neuromuscular Re-education                                                 Modalities                                                HEP:  Assignment date:   Tendon glides and opposition 6/25/24   Rubber band digital extension and abduction 6/27/24   Median nerve glides 7/9/2024      Charges: TE 3        Total Timed Treatment: 40 min  Total Treatment Time: 40 min

## 2024-07-12 PROCEDURE — 77387 GUIDANCE FOR RADJ TX DLVR: CPT | Performed by: INTERNAL MEDICINE

## 2024-07-12 PROCEDURE — 77336 RADIATION PHYSICS CONSULT: CPT | Performed by: INTERNAL MEDICINE

## 2024-07-12 PROCEDURE — 77412 RADIATION TX DELIVERY LVL 3: CPT | Performed by: INTERNAL MEDICINE

## 2024-07-15 PROCEDURE — 77387 GUIDANCE FOR RADJ TX DLVR: CPT | Performed by: INTERNAL MEDICINE

## 2024-07-15 PROCEDURE — 77412 RADIATION TX DELIVERY LVL 3: CPT | Performed by: INTERNAL MEDICINE

## 2024-07-16 ENCOUNTER — APPOINTMENT (OUTPATIENT)
Dept: RADIATION ONCOLOGY | Facility: HOSPITAL | Age: 49
End: 2024-07-16
Attending: INTERNAL MEDICINE
Payer: COMMERCIAL

## 2024-07-16 ENCOUNTER — OFFICE VISIT (OUTPATIENT)
Dept: OCCUPATIONAL MEDICINE | Facility: HOSPITAL | Age: 49
End: 2024-07-16
Attending: INTERNAL MEDICINE
Payer: COMMERCIAL

## 2024-07-16 PROCEDURE — 77280 THER RAD SIMULAJ FIELD SMPL: CPT | Performed by: INTERNAL MEDICINE

## 2024-07-16 PROCEDURE — 77412 RADIATION TX DELIVERY LVL 3: CPT | Performed by: INTERNAL MEDICINE

## 2024-07-16 PROCEDURE — 97110 THERAPEUTIC EXERCISES: CPT

## 2024-07-16 NOTE — PROGRESS NOTES
Diagnosis:   Chemotherapy-induced peripheral neuropathy (HCC) (G62.0,T45.1X5A)        Referring Provider: Montrell Heaton  Date of Evaluation:    6/25/2024    Precautions:   HTN Next MD visit:   none scheduled  Date of Surgery: n/a   Insurance Primary/Secondary: BCBS OUT OF STATE / N/A     # Auth Visits: 5 visit 5/15-9/15              Progress Summary  Pt has attended 5, cancelled 0, and no shown 0 visits in Occupational Therapy.     Subjective: Pt states she has been trying to use her hands more. Burning numbness remains present, however, she feels stronger.     Assessment: Pt has made good progress towards her goals. Pt met 3/6 goals and progressed in the others. Pt's numbness remains the same as her initial eval, however, strength has improved. Pt would benefit from continued skilled OT to achieve goals and return to PLOF. OT requesting 5 additional visits to met final goals.    Objective Data:     MANUAL MUSCLE TESTING: (* denotes performed with pain)   Wrist Eval Wrist Today   Flexion: R 4+/5, L 4+/5  Extension: R 4+/5, L 3+/5     Extension: R 4+/5, L 4+/5        Strength (lbs) Right Average Eval Right Average Today Left Average Eval Left Average Today   : 28 31 (+3) 22 37 (+15)   2 pt Pinch: 3 4 (+1) 2 4 (+2)   3 pt Pinch: 3 4 (+1) 4 7 (+3)   Lateral Pinch: 4 7 (+3) 4 11 (+7)     Coordination:   Eval 9 Hole Peg Test: Right hand- 48 seconds, Left Hand- 38 seconds   TODAY 9 Hole Peg Test: Right hand- 40 seconds, Left Hand- 24 seconds    Goals:   Pt will be independent and compliant with comprehensive HEP to maintain progress achieved in OT-MET,  will updated  Pt will decrease their (bilateral) 9-hole peg completion time by 8-10 seconds for ease of fine motor manipulation during dressing tasks-MET  Pt will increase their (L) wrist extension MMT score to at least 4+/5 for ease opening doors-MET  Pt will increase their (Bilateral)  strength by 10# for ease of carrying groceries-Progressed, met in Left  Pt  will increase their (Bilateral) pinch strength scores by 3-5# for ease of opening bottles and jars.-Progressed, not met in all  Pt will decrease their QuickDASH score by 10% in order to demo improvements in functional independence.-Not tested    Rehab Potential: excellent    Plan: Continue skilled Occupational Therapy 1-2x/week or a total of 5 visits over a 90 day period. Treatment will include: Manual Therapy, Soft tissue mobilization, AROM, PROM, Strengthening, Therapeutic Activity, Moist heat, cryotherapy, Ultrasound, Whirlpool (fluidotherapy), Paraffin, Electrical Stim, Orthosis,  Neuromuscular Re-education, Patient education, Home exercise program,           Patient/Family/Caregiver was advised of these findings, precautions, and treatment options and has agreed to actively participate in planning and for this course of care.    Thank you for your referral. If you have any questions, please contact me at Dept: [phone].    Sincerely,  Electronically signed by therapist: Cristal Andrade OT    Physician's certification required: Yes  Please co-sign or sign and return this letter via fax as soon as possible to [phone].   I certify the need for these services furnished under this plan of treatment and while under my care.    X___________________________________________________ Date____________________    Certification From: 7/16/2024  To:10/14/2024                Treatment Note  Subjective: Pt reports continued burning sensation within her first 3 fingers.    Pain/Numbness: 7/10      Objective: See exercises flowsheet below for exercises and activities performed today. All exercises performed bilaterally      Assessment: See progress note     Goals:    Pt will be independent and compliant with comprehensive HEP to maintain progress achieved in OT  Pt will decrease their (bilateral) 9-hole peg completion time by 8-10 seconds for ease of fine motor manipulation during dressing tasks  Pt will increase their  (L) wrist extension MMT score to at least 4+/5 for ease opening doors  Pt will increase their (Bilateral)  strength by 10# for ease of carrying groceries  Pt will increase their (Bilateral) pinch strength scores by 3-5# for ease of opening bottles and jars.  Pt will decrease their QuickDASH score by 10% in order to demo improvements in functional independence.     Plan: see progress note  Date 6/27/2024 7/9/2024 7/11/2024 7/16/2024            Visit # 2/5  3/5 4/5 5/5                              Evaluation                 Manual                                                 Ther ex                                  Forearm flexor/extensor stretch x3, 20 secs    Red web ex: 10x2  -/twist  -lumbrical squeeze    Towel scrunches x10    Tendon glides x10    Rubber band ex:  -digital abduction  -lumbrical extension    2# wrist flex/extend 10x2    Red flexbar 10# bilateral and ipsilateral sup/pronation   10x2  \"T\" bends with flexbar 5x1 Forearm flexor/extensor stretch x3, 20 secs    Wrist AROM flex/extension 20x1    Median nerve glides 10x1     Chest/pectoral stretch x3, 20 secs    Scapular Retraction 10x2  -Is and Ys    Red web ex: 10x2  -/twist  -lumbrical squeeze    Rubber band ex: 10x2  -digital abduction  -lumbrical extension    1# dowel sup/pronation 10x2    Red flexbar 10# bilateral sup/pronation  \"T\" bends with flexbar    10x2    Digi-flex 3# isolated digital flexion 10x1     Forearm flexor/extensor stretch x3, 20 secs    Median nerve glides 10x1     Scapular Retraction 10x2  -Is and Ys    Chest/pectoral stretch x3, 20 secs    Wall slides with yellow band 10x2    Yellow Putty Ex: 10x2  -  -lumbrical squeeze  -oppositional pinch  -adduction  -lumbrical extension    Blue  web ex: 10x2  -/twist    1# dowel sup/pronation 10x2     Forearm flexor/extensor stretch x3, 20 secs    Median nerve glides 10x1     Blue web ex: 10x2  -/twist  -lumbrical squeeze    Green pin 3 point pinch 38 block  removal    Green flexbar 15# bilateral and ipsilateral sup/pronation 10x2    Red Putty Ex: 10x2  -oppositional pinch  -digital adduction           HEP instruction                    Therapeutic Activity                                                 Neuromuscular Re-education                                                 Modalities                                                HEP:  Assignment date:   Tendon glides and opposition 6/25/24   Rubber band digital extension and abduction 6/27/24   Median nerve glides 7/9/2024      Charges: TE 3        Total Timed Treatment: 40 min  Total Treatment Time: 40 min

## 2024-07-17 PROCEDURE — 77387 GUIDANCE FOR RADJ TX DLVR: CPT | Performed by: INTERNAL MEDICINE

## 2024-07-17 PROCEDURE — 77412 RADIATION TX DELIVERY LVL 3: CPT | Performed by: INTERNAL MEDICINE

## 2024-07-17 NOTE — PROGRESS NOTES
[facility] Radiation Treatment Management Note 26-30    Patient:  Alisia Berg  Age:  49 year old  Visit Diagnosis:    1. Malignant neoplasm of left breast in female, estrogen receptor positive, unspecified site of breast (HCC)      Primary Rad/Onc:  Dr. Rosario Jose    Site Delivered Dose (cGy) Prescribed Dose (cGy) Fraction #   L breast 5000 5000 25/25   L SCF 5000 5000 25/25   L breast boost 600 1000 3/5     First treatment date:   6/10/24   Concurrent chemotherapy:  none        7/3/2024    10:09 AM 7/11/2024     3:37 PM 7/18/2024     3:36 PM   Oncology Vitals   /59 120/79 106/62   Pulse 73 78 84   Resp 18 16 16   Temp 97.5 °F (36.4 °C) 97.5 °F (36.4 °C) 98 °F (36.7 °C)   SpO2 98 % 99 % 98 %   Pain Score 0 0 0        Toxicities:  Fatigue Grade 1= Fatigue relieved by rest- only a little, per patient a 5 minute nap relieves fatigue  Pruritis Grade 1= Mild or localized; topical intervention indicated  Dry Skin noted- mild   Dermatitis associated with radiation Grade 1= Faint erythema or dry desquamation  Moisturizer used Vanicream and Mometasone applied Number of times: 2 times daily.  Hyperpigmentation noted    Nursing Note:  Patient seen for OTV with Dr. Jose.   Offered , patient declined.  Patient notes left shoulder pain immediately after treatment, relieved with rest. Patient thinks is positional from treatment.  Tolerating RT well. Finishes treatment 7/22/24.  AVS given, follow up in 6-8 weeks.     Yamilex CRISTINA RN    Physician Note:  Subjective:  -doing well, minimal breast soreness      Objective:  Vitals noted  ECOG 0  NAD  Grade 1-2 dermatitis, no desq      Treatment setup imaging have been reviewed:  Yes    Assessment/Plan:  -finishes RT next week, discussed post RT skin care    We discussed expected future toxicity. All questions answered.  RTC 6-8w or sooner if needed    Rosario Jose MD

## 2024-07-18 ENCOUNTER — OFFICE VISIT (OUTPATIENT)
Dept: RADIATION ONCOLOGY | Facility: HOSPITAL | Age: 49
End: 2024-07-18
Attending: INTERNAL MEDICINE
Payer: COMMERCIAL

## 2024-07-18 VITALS
TEMPERATURE: 98 F | HEART RATE: 84 BPM | RESPIRATION RATE: 16 BRPM | DIASTOLIC BLOOD PRESSURE: 62 MMHG | OXYGEN SATURATION: 98 % | SYSTOLIC BLOOD PRESSURE: 106 MMHG

## 2024-07-18 DIAGNOSIS — Z17.0 MALIGNANT NEOPLASM OF LEFT BREAST IN FEMALE, ESTROGEN RECEPTOR POSITIVE, UNSPECIFIED SITE OF BREAST (HCC): Primary | ICD-10-CM

## 2024-07-18 DIAGNOSIS — C50.912 MALIGNANT NEOPLASM OF LEFT BREAST IN FEMALE, ESTROGEN RECEPTOR POSITIVE, UNSPECIFIED SITE OF BREAST (HCC): Primary | ICD-10-CM

## 2024-07-18 PROCEDURE — 77412 RADIATION TX DELIVERY LVL 3: CPT | Performed by: INTERNAL MEDICINE

## 2024-07-18 PROCEDURE — 77387 GUIDANCE FOR RADJ TX DLVR: CPT | Performed by: INTERNAL MEDICINE

## 2024-07-18 RX ORDER — AMOXICILLIN 500 MG/1
500 TABLET, FILM COATED ORAL EVERY 8 HOURS
COMMUNITY
Start: 2024-06-06

## 2024-07-18 NOTE — PATIENT INSTRUCTIONS
Follow up with Dr. Jose in 6-8 weeks.  Wendy will call you to schedule your follow up appointment.   Please call [phone] with any radiation questions.   Continue to moisturize for the next 2 weeks with Vanicream.   STOP mometasone on your last day of treatment.

## 2024-07-19 PROCEDURE — 77387 GUIDANCE FOR RADJ TX DLVR: CPT | Performed by: INTERNAL MEDICINE

## 2024-07-19 PROCEDURE — 77412 RADIATION TX DELIVERY LVL 3: CPT | Performed by: INTERNAL MEDICINE

## 2024-07-19 PROCEDURE — 77336 RADIATION PHYSICS CONSULT: CPT | Performed by: INTERNAL MEDICINE

## 2024-07-22 ENCOUNTER — DOCUMENTATION ONLY (OUTPATIENT)
Dept: RADIATION ONCOLOGY | Facility: HOSPITAL | Age: 49
End: 2024-07-22

## 2024-07-22 PROCEDURE — 77387 GUIDANCE FOR RADJ TX DLVR: CPT | Performed by: INTERNAL MEDICINE

## 2024-07-22 PROCEDURE — 77412 RADIATION TX DELIVERY LVL 3: CPT | Performed by: INTERNAL MEDICINE

## 2024-07-23 ENCOUNTER — OFFICE VISIT (OUTPATIENT)
Dept: OCCUPATIONAL MEDICINE | Facility: HOSPITAL | Age: 49
End: 2024-07-23
Attending: INTERNAL MEDICINE
Payer: COMMERCIAL

## 2024-07-23 PROCEDURE — 97110 THERAPEUTIC EXERCISES: CPT

## 2024-07-23 NOTE — PROGRESS NOTES
Diagnosis:   Chemotherapy-induced peripheral neuropathy (HCC) (G62.0,T45.1X5A)        Referring Provider: Montrell Heaton  Date of Evaluation:    6/25/2024    Precautions:   HTN Next MD visit:   none scheduled  Date of Surgery: n/a   Insurance Primary/Secondary: BCBS OUT OF STATE / N/A     # Auth Visits: 5 visit 5/15-9/15            Subjective: Pt reports no burning sensation today.    Pain/Numbness: 0/10      Objective: See exercises flowsheet below for exercises and activities performed today. All exercises performed bilaterally      Assessment: Pt demo'd good endurance with less rest breaks throughout exercises. Symptoms did increase with theraputty exercises, however, once completed symptoms subsided. Pt able to grade up to green flexbar today.     Goals:    Pt will be independent and compliant with comprehensive HEP to maintain progress achieved in OT-MET,  will updated  Pt will decrease their (bilateral) 9-hole peg completion time by 8-10 seconds for ease of fine motor manipulation during dressing tasks-MET  Pt will increase their (L) wrist extension MMT score to at least 4+/5 for ease opening doors-MET  Pt will increase their (Bilateral)  strength by 10# for ease of carrying groceries-Progressed, met in Left  Pt will increase their (Bilateral) pinch strength scores by 3-5# for ease of opening bottles and jars.-Progressed, not met in all  Pt will decrease their QuickDASH score by 10% in order to demo improvements in functional independence.-Not tested    Plan: Continue to work on strength and mobility of the bilateral hands.   Date 6/27/2024 7/9/2024 7/11/2024 7/16/2024 7/23/2024          Visit # 2/5  3/5  4/5  5/5  6/10                            Evaluation                 Manual                                                 Ther ex                                  Forearm flexor/extensor stretch x3, 20 secs    Red web ex: 10x2  -/twist  -lumbrical squeeze    Towel scrunches x10    Tendon  glides x10    Rubber band ex:  -digital abduction  -lumbrical extension    2# wrist flex/extend 10x2    Red flexbar 10# bilateral and ipsilateral sup/pronation   10x2  \"T\" bends with flexbar 5x1 Forearm flexor/extensor stretch x3, 20 secs    Wrist AROM flex/extension 20x1    Median nerve glides 10x1     Chest/pectoral stretch x3, 20 secs    Scapular Retraction 10x2  -Is and Ys    Red web ex: 10x2  -/twist  -lumbrical squeeze    Rubber band ex: 10x2  -digital abduction  -lumbrical extension    1# dowel sup/pronation 10x2    Red flexbar 10# bilateral sup/pronation  \"T\" bends with flexbar    10x2    Digi-flex 3# isolated digital flexion 10x1     Forearm flexor/extensor stretch x3, 20 secs    Median nerve glides 10x1     Scapular Retraction 10x2  -Is and Ys    Chest/pectoral stretch x3, 20 secs    Wall slides with yellow band 10x2    Yellow Putty Ex: 10x2  -  -lumbrical squeeze  -oppositional pinch  -adduction  -lumbrical extension    Blue  web ex: 10x2  -/twist    1# dowel sup/pronation 10x2     Forearm flexor/extensor stretch x3, 20 secs    Median nerve glides 10x1     Blue web ex: 10x2  -/twist  -lumbrical squeeze    Green pin 3 point pinch 38 block removal    Green flexbar 15# bilateral and ipsilateral sup/pronation 10x2    Red Putty Ex: 10x2  -oppositional pinch  -digital adduction  -lumbrical squueze    1# dowel sup/pronation 10x2   Forearm flexor/extensor stretch x3, 20 secs    Blue web ex: x20  -/twist  -lumbrical squeeze    Red Putty:   -15 chip presses and locating  -grasps and pulls x10  -oppositional pinch x20    1.5# dowel sup/pronation 10x2    1.5# dowel wrist circles 10x2    2# belted dowel wrist flex/extend 10x2    Green 15# flexbar   -bilateral sup/pronation 10x2    Median nerve glides 10x1         HEP instruction                    Therapeutic Activity                                                 Neuromuscular Re-education                                                  Modalities                                                HEP:  Assignment date:   Tendon glides and opposition 6/25/24   Rubber band digital extension and abduction 6/27/24   Median nerve glides 7/9/2024      Charges: TE 3        Total Timed Treatment: 40 min  Total Treatment Time: 40 min

## 2024-07-25 ENCOUNTER — OFFICE VISIT (OUTPATIENT)
Dept: OCCUPATIONAL MEDICINE | Facility: HOSPITAL | Age: 49
End: 2024-07-25
Attending: INTERNAL MEDICINE
Payer: COMMERCIAL

## 2024-07-25 PROCEDURE — 97110 THERAPEUTIC EXERCISES: CPT

## 2024-07-25 NOTE — PROGRESS NOTES
Diagnosis:   Chemotherapy-induced peripheral neuropathy (HCC) (G62.0,T45.1X5A)        Referring Provider: Montrell Heaton  Date of Evaluation:    6/25/2024    Precautions:   HTN Next MD visit:   none scheduled  Date of Surgery: n/a   Insurance Primary/Secondary: BCBS OUT OF STATE / N/A     # Auth Visits: 5 visit 5/15-9/15            Subjective: Pt reports same symptoms as last session. No burning but feels like \"her hands have been soaking in water.\"    Pain/Numbness: 0/10      Objective: See exercises flowsheet below for exercises and activities performed today. All exercises performed bilaterally      Assessment: Pt able to increase resistance of her putty and resistance pin exercises without verbalization of return of numbness. Pt progressing well towards reaching final  and pinch goals.       Goals:    Pt will be independent and compliant with comprehensive HEP to maintain progress achieved in OT-MET,  will updated  Pt will decrease their (bilateral) 9-hole peg completion time by 8-10 seconds for ease of fine motor manipulation during dressing tasks-MET  Pt will increase their (L) wrist extension MMT score to at least 4+/5 for ease opening doors-MET  Pt will increase their (Bilateral)  strength by 10# for ease of carrying groceries-Progressed, met in Left  Pt will increase their (Bilateral) pinch strength scores by 3-5# for ease of opening bottles and jars.-Progressed, not met in all  Pt will decrease their QuickDASH score by 10% in order to demo improvements in functional independence.-Not tested    Plan: Continue to work on strength and mobility of the bilateral hands.   Date 6/27/2024 7/9/2024 7/11/2024 7/16/2024 7/23/2024 7/25/2024        Visit # 2/5  3/5  4/5  5/5  6/10  7/10                          Evaluation                 Manual                                                 Ther ex                                  Forearm flexor/extensor stretch x3, 20 secs    Red web ex:  10x2  -/twist  -lumbrical squeeze    Towel scrunches x10    Tendon glides x10    Rubber band ex:  -digital abduction  -lumbrical extension    2# wrist flex/extend 10x2    Red flexbar 10# bilateral and ipsilateral sup/pronation   10x2  \"T\" bends with flexbar 5x1 Forearm flexor/extensor stretch x3, 20 secs    Wrist AROM flex/extension 20x1    Median nerve glides 10x1     Chest/pectoral stretch x3, 20 secs    Scapular Retraction 10x2  -Is and Ys    Red web ex: 10x2  -/twist  -lumbrical squeeze    Rubber band ex: 10x2  -digital abduction  -lumbrical extension    1# dowel sup/pronation 10x2    Red flexbar 10# bilateral sup/pronation  \"T\" bends with flexbar    10x2    Digi-flex 3# isolated digital flexion 10x1     Forearm flexor/extensor stretch x3, 20 secs    Median nerve glides 10x1     Scapular Retraction 10x2  -Is and Ys    Chest/pectoral stretch x3, 20 secs    Wall slides with yellow band 10x2    Yellow Putty Ex: 10x2  -  -lumbrical squeeze  -oppositional pinch  -adduction  -lumbrical extension    Blue  web ex: 10x2  -/twist    1# dowel sup/pronation 10x2     Forearm flexor/extensor stretch x3, 20 secs    Median nerve glides 10x1     Blue web ex: 10x2  -/twist  -lumbrical squeeze    Green pin 3 point pinch 38 block removal    Green flexbar 15# bilateral and ipsilateral sup/pronation 10x2    Red Putty Ex: 10x2  -oppositional pinch  -digital adduction  -lumbrical squueze    1# dowel sup/pronation 10x2   Forearm flexor/extensor stretch x3, 20 secs    Blue web ex: x20  -/twist  -lumbrical squeeze    Red Putty:   -15 chip presses and locating  -grasps and pulls x10  -oppositional pinch x20    1.5# dowel sup/pronation 10x2    1.5# dowel wrist circles 10x2    2# belted dowel wrist flex/extend 10x2    Green 15# flexbar   -bilateral sup/pronation 10x2    Median nerve glides 10x1 Forearm flexor/extensor stretch x3, 20 secs    Blue web ex: x20  -/twist  -lumbrical squeeze    Green Theraputty ex:    -oppositional tip pinch  -lumbrical squeeze  -MP  and twist with dowel    Green flexbar 15# bilateral and ipsilateral sup/pronation 10x2    Green pin 3 point pinch 38 block removal    Red elastic band seated -scapular retraction   -external rotation  10x2    FMC activity tweezer and small peg board (5 mins)       HEP instruction                    Therapeutic Activity                                                 Neuromuscular Re-education                                                 Modalities                                                HEP:  Assignment date:   Tendon glides and opposition 6/25/24   Rubber band digital extension and abduction 6/27/24   Median nerve glides 7/9/2024      Charges: TE 3        Total Timed Treatment: 43 min  Total Treatment Time: 43 min

## 2024-07-26 ENCOUNTER — SOCIAL WORK SERVICES (OUTPATIENT)
Dept: HEMATOLOGY/ONCOLOGY | Facility: HOSPITAL | Age: 49
End: 2024-07-26

## 2024-07-26 ENCOUNTER — OFFICE VISIT (OUTPATIENT)
Dept: HEMATOLOGY/ONCOLOGY | Facility: HOSPITAL | Age: 49
End: 2024-07-26
Attending: INTERNAL MEDICINE
Payer: COMMERCIAL

## 2024-07-26 VITALS
TEMPERATURE: 98 F | OXYGEN SATURATION: 98 % | SYSTOLIC BLOOD PRESSURE: 113 MMHG | HEIGHT: 65 IN | WEIGHT: 206.19 LBS | DIASTOLIC BLOOD PRESSURE: 64 MMHG | RESPIRATION RATE: 18 BRPM | BODY MASS INDEX: 34.35 KG/M2 | HEART RATE: 77 BPM

## 2024-07-26 DIAGNOSIS — C50.112 MALIGNANT NEOPLASM OF CENTRAL PORTION OF LEFT BREAST IN FEMALE, ESTROGEN RECEPTOR POSITIVE (HCC): Primary | ICD-10-CM

## 2024-07-26 DIAGNOSIS — G62.0 CHEMOTHERAPY-INDUCED PERIPHERAL NEUROPATHY (HCC): ICD-10-CM

## 2024-07-26 DIAGNOSIS — Z17.0 MALIGNANT NEOPLASM OF CENTRAL PORTION OF LEFT BREAST IN FEMALE, ESTROGEN RECEPTOR POSITIVE (HCC): Primary | ICD-10-CM

## 2024-07-26 DIAGNOSIS — T45.1X5A CHEMOTHERAPY-INDUCED PERIPHERAL NEUROPATHY (HCC): ICD-10-CM

## 2024-07-26 PROCEDURE — 99215 OFFICE O/P EST HI 40 MIN: CPT | Performed by: INTERNAL MEDICINE

## 2024-07-26 RX ORDER — TAMOXIFEN CITRATE 20 MG/1
20 TABLET ORAL DAILY
Qty: 90 TABLET | Refills: 3 | Status: SHIPPED | OUTPATIENT
Start: 2024-07-26

## 2024-07-26 NOTE — PATIENT INSTRUCTIONS
From Up-to-Date review of data for your treatment from standard of care guidelines.       HIGHER-RISK, HORMONE RECEPTOR-POSITIVE, HER2-NEGATIVE CANCERS     Identification of higher-risk patients and approach -- The addition of ovarian function suppression (OFS)/ablation to either an aromatase inhibitor (AI) or tamoxifen for some patients results in a clinically significant reduction in the risk of recurrence but does increase toxicity. As such, for those at higher risk for recurrence, we suggest OFS with an AI (or OFS with tamoxifen, as an alternative), given that this subset of patients has the highest likelihood of experiencing benefit. This recommendation is based on the results of the Suppression of Ovarian Function Trial (SOFT) and the Tamoxifen and Exemestane Trial (TEXT), both of which are discussed below.     While formal criteria to define higher risk are not present, a reasonable definition would include patients with either of the following attributes:     ?Patients in whom chemotherapy is indicated - Such as patients with the presence of pathologically involved lymph nodes, large tumor size, high risk of recurrence based on a genomic assay, or other high-risk features for which the patient received chemotherapy (algorithm 1). (See \"Deciding when to use adjuvant chemotherapy for hormone receptor-positive, HER2-negative breast cancer\".)     ?Patients at a younger age (ie, ?35 years) - We consider patients in this age group to be at a higher risk of recurrence, and hence we offer such women OFS, in addition to chemotherapy.     For patients not tolerating OFS, the option of tamoxifen alone followed by an AI after several years may be appropriate, depending on patient preferences, cancer risk factors, and the timing of the transition to menopause. (See 'Transitioning from tamoxifen to an AI after menopause' below.)     Women who tolerate OFS without difficulty, plan to receive ongoing OFS, and are not  interested in further childbearing or recovery of ovarian function in the future may consider surgical oophorectomy as an appropriate alternative to ongoing gonadotropin-releasing hormone agonist (GnRHa) treatment.     Ovarian suppression plus endocrine therapy     Methods of suppression -- Ovarian function can be suppressed with a GnRHa or by permanent methods, such as oophorectomy or, rarely, irradiation. Any of these strategies is appropriate, depending on patient preferences, although we often start with a GnRHa and proceed to oophorectomy if OFS is tolerated.     ?Appropriate GnRHas include goserelin, leuprolide, or triptorelin. We typically use the monthly rather than every-three-month formulations, as this was the dosing schedule used in the SOFT/TEXT trials discussed below. (See 'Efficacy in high-risk disease' below.)     Nevertheless, frequency of visits and access to treatment facilities must be considered. (See \"COVID-19: Considerations in patients with cancer\", section on 'Cancer treatment in uninfected patients'.)     ?Oophorectomy results in reliable and prompt reduction in the levels of circulating estrogens but carries the risks associated with anesthesia and surgery [2]. The technical aspects of oophorectomy are covered elsewhere. (See \"Oophorectomy and ovarian cystectomy\".)     ?Ovarian irradiation can be used to ablate ovarian function, although the limited data available suggest it may be more effective in women less than 40 years [3]. This requires cumulative doses over 12 Gy, as lower doses are associated with decreased efficacy. Because of the side effects of radiotherapy, this approach is rarely used in contemporary practice.     Efficacy in high-risk disease -- As discussed above, we utilize OFS and either tamoxifen or an AI for high-risk, hormone receptor-positive patients.     The approach to OFS in premenopausal women is informed, in large part, by two large phase III trials of  premenopausal women with operable, hormone receptor-positive breast cancer, in which use of chemotherapy was optional [4-8].     ?In SOFT, over 3000 such women were randomly assigned to one of three arms: tamoxifen alone, tamoxifen plus OFS, or exemestane plus OFS [4]. Women were allowed to enter following surgery alone (in which case they were randomized within 12 weeks of surgery) or within eight months of the end of chemotherapy, provided they had biochemical proof they remained premenopausal.     ?In TEXT, over 2600 premenopausal women were randomly assigned to receive tamoxifen plus OFS or exemestane plus OFS after surgery. In contrast to the SOFT trial, in TEXT, if chemotherapy was administered, OFS was initiated concurrently with chemotherapy.     Rationale for OFS -- Subgroup analyses from the TEXT and SOFT trials suggest that patients with a higher risk of relapse derive a benefit with ovarian function suppression (OFS) plus either AI or tamoxifen versus tamoxifen alone [4,7-12]. Rationale for use of an AI over tamoxifen, when either is used in combination with OFS, is discussed below. (See 'Rationale for AI over tamoxifen, in combination with OFS' below.)     At twelve years, the following results were observed among premenopausal patients in the SOFT trial, 85 percent of whom had HER2 negative disease, and approximately half of whom had received chemotherapy in the (darlin)adjuvant setting [12].     ?In the intention to treat analysis, which included 3047 women:     Disease-free survival (DFS) - Tamoxifen alone had a lower DFS rate (72 percent) relative to both tamoxifen plus OFS (76 percent, HR 0.82) and exemestane plus OFS (79 percent, HR 0.69).     Overall survival (OS) - Tamoxifen alone resulted in a nonstatistically significant lower OS rate (87 percent) relative to tamoxifen plus OFS (89 percent, HR 0.86) and exemestane plus OFS (89.4 percent, HR 0.70).     ?In the subgroup of patients with  HER2-negative tumors, OS rates were 88 percent with tamoxifen, 89 percent with tamoxifen plus OFS, and 91 percent with exemestane plus OFS.     Among patients with HER2-negative disease and prior chemotherapy, OS rates were 79 percent with tamoxifen, 81 percent with tamoxifen plus OFS, and 84 percent with exemestane plus OFS. These differences did not reach statistical significance.     The benefits of OFS appear most pronounced in younger women (less than 35 years) as compared with women 35 years or older. In the SOFT trial, among 233 women younger than 35 years, the rate of freedom from breast cancer at five years was lower for patients treated with tamoxifen alone (68 percent) compared with those assigned to tamoxifen plus OFS and those assigned to exemestane plus OFS (79 and 83 percent, respectively) [4]. Most, 94 percent, of these patients had also received chemotherapy.     Other trials have shown improvements in recurrence risk and modest improvements in OS with the addition of a GNRHa in premenopausal women receiving adjuvant chemotherapy.     ?In the 2007 Early Breast Cancer Trialists' Collaborative Group (EBCTCG) meta-analysis including over 2700 premenopausal women with hormone receptor-positive breast cancer treated with adjuvant chemotherapy (either with or without tamoxifen), the addition of a GnRHa reduced the risk of recurrence (HR 0.88, 95% CI 77.4-99.7) and hazard rate for death from recurrence (HR 0.85, 95% CI 72.4-99.9) [5].     ?Additionally, in a trial of 1483 women with estrogen receptor-positive breast cancer treated with surgery and chemotherapy who remained premenopausal after treatment, the eight-year DFS for tamoxifen and OFS was 85 versus 80 percent for those receiving tamoxifen only (HR 0.67, 95% CI 0.51-0.87) [13]. There were no statistically significant differences in OS between the two groups (96.5 versus 95.3 percent respectively; HR 0.78, 95% CI 0.49-1.25) [14]. The length of OFS  in this trial, two years, was shorter than in SOFT/TEXT, in which it was administered for five years. (See 'Duration of endocrine therapy' below.)     While other studies, including a randomized trial and a meta-analysis, have suggested similar DFS outcomes among patients (with either hormone receptor-positive or negative disease) receiving chemotherapy with or without a GnRHa, these studies were either underpowered to identify a beneficial DFS effect if it existed or were heterogeneous in regards to their patient populations [15,16].     Given the available data, we utilize OFS with endocrine therapy for high-risk, hormone receptor-positive premenopausal patients. (See 'Identification of higher-risk patients and approach' above.)     Rationale for AI over tamoxifen, in combination with OFS -- Our preference is for an aromatase inhibitor (AI) rather than tamoxifen when combining with ovarian function suppression (OFS) for patients with high-risk disease.     A joint analysis of the SOFT-TEXT trials evaluated outcomes in 4690 premenopausal patients randomly assigned to 5 years of exemestane plus OFS versus tamoxifen plus OFS, with a median follow up of 13 years. Among patients with HER2-negative tumors (86 percent), exemestane plus OFS improved 12-year OS compared with tamoxifen plus OFS by 2 percent (91 versus 89 percent, HR 0.85; 95% CI, 0.70 to 1.04), with a 3.3 percent absolute benefit among those who received chemotherapy (figure 1) [17]. In subset analyses, which did not achieve statistical significance, the magnitude of benefit was greatest in high-risk patients (eg, women aged <35 years [4.0 percent] and those with >2 cm [4.5 percent] or grade 3 tumors [5.5 percent]).     Similar findings were described in a meta-analysis by the EBCTCG that included the ABCSG-12 and HOBOE trials in addition to SOFT and TEXT [18]. Among over 7000 patients with hormone receptor-positive breast cancer receiving adjuvant OFS, 40  percent of whom were node positive, 10-year recurrence rates were 18 percent with tamoxifen and 15 percent with an AI (relative risk [RR] 0.79, 95% CI 0.69-0.90). These benefits were primarily observed in years 0 through 4, when treatments differed, but were not observed in years 5 and beyond. Distant recurrences were also improved with AI administration (absolute improvement at 10 years of 1.9 percent; RR 0.83, 95% CI 0.71-0.97), although breast cancer mortality rates were similar (RR 1.01).     Toxicity -- Side effects related to tamoxifen and AIs, and their management, are discussed elsewhere. (See \"Managing the side effects of tamoxifen and aromatase inhibitors\".)     The side effect profile of OFS or ablation mimics symptoms of estrogen deprivation, as with menopause. These include hot flashes, sweats, weight gain, and decreased libido. (See \"Clinical manifestations and diagnosis of menopause\", section on 'Clinical manifestations'.)     ?Toxicities with OFS with GnRHas include musculoskeletal symptoms, hot flashes, and bone mineral density loss.     Across both SOFT and TEXT, toxicities were greater among those receiving OFS relative to single-agent tamoxifen. Of the women receiving OFS, those treated with exemestane versus tamoxifen experienced more osteoporosis and musculoskeletal complaints, though overall severe toxicities were similar, as follows [7,9]:     -Osteoporosis in 42 percent with exemestane plus OFS, 28 percent with tamoxifen plus OFS, and 14 percent with tamoxifen only, respectively.     -Musculoskeletal symptoms in 90 percent with exemestane plus OFS, 78 percent with tamoxifen plus OFS, and 70 percent with tamoxifen only, respectively.     -Grade 3 or 4 adverse events in 32 percent with exemestane plus OFS, 31 percent with tamoxifen plus OFS, and 25 percent with tamoxifen only, respectively.     A separate study also suggested that the combination of OFS and tamoxifen resulted in worse  menopausal symptoms and a higher incidence of sexual dysfunction relative to tamoxifen alone, resulting in a lower health-related quality of life at three years [6].     ?For women who undergo either oophorectomy or ovarian ablation, premature menopause may be associated with long-term health effects, including cardiovascular disease. (See \"Overview of atherosclerotic cardiovascular risk factors in females\", section on 'Early menopause'.)     ?For women undergoing ovarian irradiation, despite tailoring the treatment field to the ovaries, there is a risk of radiation to the normal tissue surrounding the ovary, including the bowels. (See \"Overview of gastrointestinal toxicity of radiation therapy\".)     Sequencing ovarian suppression with other systemic therapies -- Although some [employer] experts prefer to start a GnRHa after chemotherapy and add AIs two to three months later, given the possibility of a delay in OFS, others initiate OFS and AIs in tandem, according to the SOFT/TEXT trials discussed above [4-8]. For those who will be treated with tamoxifen and OFS, they are initiated concurrently.     Addition of targeted therapies for select, high-risk cancers     Addition of CDK 4/6 inhibitor to adjuvant endocrine therapy in high-risk disease -- Abemaciclib is approved by the [place] Food and Drug Administration as adjuvant therapy for select women with high-risk, node-positive hormone receptor-positive, HER2-negative breast cancer [19]. Selection of patients for this therapy is the same regardless of menopausal status, and is discussed elsewhere. (See \"Adjuvant endocrine and targeted therapy for postmenopausal women with hormone receptor-positive breast cancer\", section on 'CDK 4/6 inhibitors for select patients with high-risk disease'.)     In the monarchE trial, over 5600 patients (just under one-half of whom were premenopausal women) with hormone receptor-positive, HER2-negative, high-risk early breast cancer who had  completed surgery were randomly assigned to abemaciclib with endocrine therapy or endocrine therapy alone [20]. At a median of 19 months' follow-up, in the premenopausal subset, adjuvant abemaciclib plus endocrine therapy improved both invasive DFS (HR 0.63, 95% CI 0.44-0.92) and distant relapse-free survival (HR 0.71, 95% CI 0.56-0.92) relative to adjuvant endocrine therapy alone [20]. Results at longer follow-up of 27 months were consistent [21]. The most frequent adverse events were diarrhea, neutropenia, and fatigue in the abemaciclib arm and arthralgia, hot flush, and fatigue in the control arm.     Further details and results of this and other relevant trials are discussed elsewhere, given that they included both pre- and postmenopausal patients. (See \"Adjuvant endocrine and targeted therapy for postmenopausal women with hormone receptor-positive breast cancer\", section on 'Incorporation of targeted therapies for select patients'.)

## 2024-07-26 NOTE — PROGRESS NOTES
HPI   Alisia Berg is a 49 year old female here at the request of NORBERTO SERNA MD for evaluation of   Encounter Diagnoses   Name Primary?    Malignant neoplasm of central portion of left breast in female, estrogen receptor positive (HCC) Yes    Chemotherapy-induced peripheral neuropathy (HCC)        Patient here status post left-sided lumpectomy with axillary lymph node dissection on 2023.  Patient 425 positive lymph nodes.  She completed course of adjuvant chemotherapy with 4 cycles of dose dense AC-T followed by weekly Taxol.    She completed radiation therapy to the left breast and supraclavicular fossa.  Treatment started from 6/10/2024 and completed on 2024.    Patient here today to discuss adjuvant endocrine therapy.    States that area treated is a little red and using vanicream.    Peripheral neuropathy:  Yes, finger and toe tips, the same.   States improving.  States tingling and burning gone and the feeling is coming back.  States improved dexterity.    Some limited ROM in the LUE     LMP 2024    ECOG PS  0    Review of Systems:   Review of Systems - Oncology  Pertinent positives per HPI.      Current Outpatient Medications   Medication Sig Dispense Refill    carBAMazepine 200 MG Oral Tab Take 1 tablet (200 mg total) by mouth 2 (two) times daily.      losartan 50 MG Oral Tab Take 1 tablet (50 mg total) by mouth at bedtime.       Allergies:   No Known Allergies    Past Medical History:    High blood pressure    Hypertension    Trigeminal neuralgia of left side of face     Past Surgical History:   Procedure Laterality Date    Lumpectomy left  2023    Needle biopsy right Right     bx done at age 20yrs old          x2     Social History     Socioeconomic History    Marital status:    Tobacco Use    Smoking status: Never    Smokeless tobacco: Never   Substance and Sexual Activity    Alcohol use: Never    Drug use: Never       Family History   Problem Relation  Age of Onset    Breast Cancer Mother 34    Cancer Paternal Grandfather         unknown type    Ovarian Cancer Neg          PHYSICAL EXAM:    /64 (BP Location: Right arm, Patient Position: Sitting, Cuff Size: large)   Pulse 77   Temp 98.3 °F (36.8 °C) (Oral)   Resp 18   Ht 1.651 m (5' 5\")   Wt 93.5 kg (206 lb 3.2 oz)   LMP 12/15/2023 (Approximate)   SpO2 98%   BMI 34.31 kg/m²   Wt Readings from Last 6 Encounters:   07/26/24 93.5 kg (206 lb 3.2 oz)   05/16/24 95.3 kg (210 lb)   05/22/24 94.5 kg (208 lb 6.4 oz)   05/15/24 95.3 kg (210 lb)   05/08/24 95 kg (209 lb 6.4 oz)   04/24/24 92.8 kg (204 lb 9.6 oz)     Physical Exam  General: Patient is alert, not in acute distress.  HEENT: EOMs intact. PERRL.   Psych/Depression: nl        ASSESSMENT/PLAN:     1. Malignant neoplasm of central portion of left breast in female, estrogen receptor positive (HCC)    2. Chemotherapy-induced peripheral neuropathy (HCC)        1. Malignant neoplasm of central portion of left breast in female, estrogen receptor positive    Cancer Staging   Malignant neoplasm of central portion of left breast in female, estrogen receptor positive (HCC)  Staging form: Breast, AJCC 8th Edition  - Clinical stage from 9/29/2023: Stage IB (cT1c, cN1(f), cM0, G2, ER+, GA+, HER2-) - Signed by Montrell Heaton MD on 9/29/2023  - Pathologic stage from 11/15/2023: Stage IB (pT1c, pN2a, cM0, G2, ER+, GA+, HER2-) - Signed by Montrell Heaton MD on 11/15/2023    -Genetic testing was negative.    -Patient underwent a left-sided lumpectomy with sentinel lymph node which was positive, and completion axillary dissection on 11/6/2023.  She had 4 of 25 positive lymph nodes.    -Patient completed adjuvant treatment with dose dense Adriamycin Cytoxan followed by weekly Taxol.     -She completed course of radiation therapy to the neck breast and supraclavicular fossa from 6/28/2024 to 7/22/2024    - Discussed the role of adjuvant hormonal therapy to decrease the  risk of future recurrence of her breast cancer. Discussed with patient that given her initial staging, as well as residual disease, she is a candidate for 10 years of adjuvant endocrine therapy, as well as she is also a candidate for 2 years of Abemaciclib while the first 2 years of adjuvant endocrine therapy. Given that she is premenopausal, recommend ovarian function suppression as well.     -I discussed with patient that we also have available clinical trial, ERI-2, which is comparing a new oral SERD, camizestrant versus standard of care endocrine therapy. Information was provided to the patient for consideration.     - She states that she will be losing her insurance on 7/31/24 and prefers to start on tamoxifen now.  D/w patient that has 12 weeks to start on abemaciclib after start of HRT and also for trial enrollment.      - Will d/w  regarding insurance coverage through the [facility] program and also assistance for abemaciclib.      - will start tamoxifen, aware of interaction with carbamazepine lowering efficacy of tamoxifen.  Recommend d/w pcp switch to gabapentin for trigeminal neuralgia.    - Hormone therapy for 10 yrs until end of July 2034.  Switch to AI once post menopausal.    She had no questions about tamoxifen after SE reviewed.     - Survivorship in 1 month, follow up in 3-6 months pending on treatment or sooner.      - B mammogram in November of 2024.      No follow-ups on file.    No orders of the defined types were placed in this encounter.    MDM high risk.  Results From Past 48 Hours:  No results found for this or any previous visit (from the past 48 hour(s)).      Imaging & Referrals:  None

## 2024-07-26 NOTE — PROGRESS NOTES
VERITO followed up with patient over the phone to discuss insurance expiring soon. VERITO discussed  [place] [employer] [facility] Assistance Program. Patient will meet with VERITO on Monday 7/29 at 9 am to complete the application.

## 2024-07-30 ENCOUNTER — OFFICE VISIT (OUTPATIENT)
Dept: OCCUPATIONAL MEDICINE | Facility: HOSPITAL | Age: 49
End: 2024-07-30
Attending: INTERNAL MEDICINE
Payer: COMMERCIAL

## 2024-07-30 PROCEDURE — 97110 THERAPEUTIC EXERCISES: CPT

## 2024-07-30 NOTE — PROGRESS NOTES
Diagnosis:   Chemotherapy-induced peripheral neuropathy (HCC) (G62.0,T45.1X5A)        Referring Provider: Montrell Heaton  Date of Evaluation:    6/25/2024    Precautions:   HTN Next MD visit:   none scheduled  Date of Surgery: n/a   Insurance Primary/Secondary: [employer] OUT OF STATE / N/A     # Auth Visits: 5 visit 5/15-9/15            Subjective: Pt reports her hands feel swollen in the morning.    Pain/Numbness: 0/10      Objective: See exercises flowsheet below for exercises and activities performed today. All exercises performed bilaterally      Assessment: Pt able to demonstrate ability to grasp 50# today through gripper in bilateral hands with minimal instances of dropping. Right hand stronger than left today. Pt and OT discussed and agreed upon plan to discharge next session.         Goals:    Pt will be independent and compliant with comprehensive HEP to maintain progress achieved in OT-MET,  will updated  Pt will decrease their (bilateral) 9-hole peg completion time by 8-10 seconds for ease of fine motor manipulation during dressing tasks-MET  Pt will increase their (L) wrist extension MMT score to at least 4+/5 for ease opening doors-MET  Pt will increase their (Bilateral)  strength by 10# for ease of carrying groceries-Progressed, met in Left  Pt will increase their (Bilateral) pinch strength scores by 3-5# for ease of opening bottles and jars.-Progressed, not met in all  Pt will decrease their QuickDASH score by 10% in order to demo improvements in functional independence.-Not tested    Plan: Discharge pt pending goal attainment  Date 6/27/2024 7/9/2024 7/11/2024 7/16/2024 7/23/2024 7/25/2024 7/30/2024      Visit # 2/5  3/5  4/5  5/5  6/10  7/10  8/10                        Evaluation                 Manual                                                 Ther ex                                  Forearm flexor/extensor stretch x3, 20 secs    Red web ex: 10x2  -/twist  -lumbrical  squeeze    Towel scrunches x10    Tendon glides x10    Rubber band ex:  -digital abduction  -lumbrical extension    2# wrist flex/extend 10x2    Red flexbar 10# bilateral and ipsilateral sup/pronation   10x2  \"T\" bends with flexbar 5x1 Forearm flexor/extensor stretch x3, 20 secs    Wrist AROM flex/extension 20x1    Median nerve glides 10x1     Chest/pectoral stretch x3, 20 secs    Scapular Retraction 10x2  -Is and Ys    Red web ex: 10x2  -/twist  -lumbrical squeeze    Rubber band ex: 10x2  -digital abduction  -lumbrical extension    1# dowel sup/pronation 10x2    Red flexbar 10# bilateral sup/pronation  \"T\" bends with flexbar    10x2    Digi-flex 3# isolated digital flexion 10x1     Forearm flexor/extensor stretch x3, 20 secs    Median nerve glides 10x1     Scapular Retraction 10x2  -Is and Ys    Chest/pectoral stretch x3, 20 secs    Wall slides with yellow band 10x2    Yellow Putty Ex: 10x2  -  -lumbrical squeeze  -oppositional pinch  -adduction  -lumbrical extension    Blue  web ex: 10x2  -/twist    1# dowel sup/pronation 10x2     Forearm flexor/extensor stretch x3, 20 secs    Median nerve glides 10x1     Blue web ex: 10x2  -/twist  -lumbrical squeeze    Green pin 3 point pinch 38 block removal    Green flexbar 15# bilateral and ipsilateral sup/pronation 10x2    Red Putty Ex: 10x2  -oppositional pinch  -digital adduction  -lumbrical squueze    1# dowel sup/pronation 10x2   Forearm flexor/extensor stretch x3, 20 secs    Blue web ex: x20  -/twist  -lumbrical squeeze    Red Putty:   -15 chip presses and locating  -grasps and pulls x10  -oppositional pinch x20    1.5# dowel sup/pronation 10x2    1.5# dowel wrist circles 10x2    2# belted dowel wrist flex/extend 10x2    Green 15# flexbar   -bilateral sup/pronation 10x2    Median nerve glides 10x1 Forearm flexor/extensor stretch x3, 20 secs    Blue web ex: x20  -/twist  -lumbrical squeeze    Green Theraputty ex:   -oppositional tip  pinch  -lumbrical squeeze  -MP  and twist with dowel    Green flexbar 15# bilateral and ipsilateral sup/pronation 10x2    Green pin 3 point pinch 38 block removal    Red elastic band seated -scapular retraction   -external rotation  10x2    FMC activity tweezer and small peg board (5 mins) Forearm flexor/extensor stretch x3, 20 secs    Digi-flex 3# isolated digital flexion 15x1    50# gripper removal of large pegs (1 round each hand)    Black web ex: 10x2  - and twist  -lumbrical squeeze    3# belted dowel wrist flex/extend 10x2    1.5# dowel sup/pronation 12x2    Green flexbar 15# bilateral sup/pronation 20x1     HEP instruction                    Therapeutic Activity                                                 Neuromuscular Re-education                                                 Modalities                                                HEP:  Assignment date:   Tendon glides and opposition 6/25/24   Rubber band digital extension and abduction 6/27/24   Median nerve glides 7/9/2024      Charges: TE 3        Total Timed Treatment: 40 min  Total Treatment Time: 40 min

## 2024-07-31 ENCOUNTER — SOCIAL WORK SERVICES (OUTPATIENT)
Dept: HEMATOLOGY/ONCOLOGY | Facility: HOSPITAL | Age: 49
End: 2024-07-31

## 2024-07-31 NOTE — PROGRESS NOTES
SW received patient's application for financial assistance. VERITO forwarded the application to [email or web address]

## 2024-08-01 ENCOUNTER — APPOINTMENT (OUTPATIENT)
Dept: OCCUPATIONAL MEDICINE | Facility: HOSPITAL | Age: 49
End: 2024-08-01
Attending: INTERNAL MEDICINE

## 2024-08-06 ENCOUNTER — APPOINTMENT (OUTPATIENT)
Dept: OCCUPATIONAL MEDICINE | Facility: HOSPITAL | Age: 49
End: 2024-08-06
Attending: INTERNAL MEDICINE

## 2024-09-04 ENCOUNTER — OFFICE VISIT (OUTPATIENT)
Dept: RADIATION ONCOLOGY | Facility: HOSPITAL | Age: 49
End: 2024-09-04
Attending: INTERNAL MEDICINE

## 2024-09-04 VITALS
SYSTOLIC BLOOD PRESSURE: 121 MMHG | WEIGHT: 199.63 LBS | TEMPERATURE: 98 F | HEART RATE: 64 BPM | OXYGEN SATURATION: 99 % | DIASTOLIC BLOOD PRESSURE: 71 MMHG | RESPIRATION RATE: 16 BRPM | BODY MASS INDEX: 33 KG/M2

## 2024-09-04 DIAGNOSIS — Z17.0 MALIGNANT NEOPLASM OF LEFT BREAST IN FEMALE, ESTROGEN RECEPTOR POSITIVE, UNSPECIFIED SITE OF BREAST (HCC): Primary | ICD-10-CM

## 2024-09-04 DIAGNOSIS — C50.912 MALIGNANT NEOPLASM OF LEFT BREAST IN FEMALE, ESTROGEN RECEPTOR POSITIVE, UNSPECIFIED SITE OF BREAST (HCC): Primary | ICD-10-CM

## 2024-09-04 PROCEDURE — 99211 OFF/OP EST MAY X REQ PHY/QHP: CPT

## 2024-09-04 NOTE — PATIENT INSTRUCTIONS
Follow up with Dr. Jose July / August 2025.  Wendy will call you to schedule your follow up appointment.   Please call [phone] with any radiation questions.     Call [phone] to schedule your mammogram for November. Make an appointment with Dr. Sparks for a date after your mammogram.     Call [phone] to schedule an appointment with Dr. Sparks.

## 2024-09-04 NOTE — PROGRESS NOTES
Nursing Follow-Up Note    Patient: Alisia Berg  YOB: 1975  Age: 49 year old  Radiation Oncologist: Dr. Rosario Jose  Referring Physician: No ref. provider found  Chief Complaint:   Chief Complaint   Patient presents with    Follow - Up     RT     Date: 9/4/2024    Toxicities:   Fatigue Grade 1= Fatigue relieved by rest- fatigue peaked 1-2 weeks after RT, now is improving. Does feel fatigued after taking tamoxifen  Pruritis Grade 0= None  Dry Skin absent- per patient  Moisturizer used Vanicream applied- occasionally       Vital Signs: /71 (BP Location: Right arm, Patient Position: Sitting, Cuff Size: large)   Pulse 64   Temp 97.8 °F (36.6 °C) (Oral)   Resp 16   Wt 90.5 kg (199 lb 9.6 oz)   LMP 12/15/2023 (Approximate)   SpO2 99%   BMI 33.22 kg/m² ,   Wt Readings from Last 6 Encounters:   09/04/24 90.5 kg (199 lb 9.6 oz)   07/26/24 93.5 kg (206 lb 3.2 oz)   05/16/24 95.3 kg (210 lb)   05/22/24 94.5 kg (208 lb 6.4 oz)   05/15/24 95.3 kg (210 lb)   05/08/24 95 kg (209 lb 6.4 oz)       Allergies:  No Known Allergies    Nursing Note:   Patient seen for follow up with Dr. Jose. Finished L breast + SCF RT 7/22/24. Last seen at [facility]. Offered to use an  for the visit, declined translation line. Vitals stable, patient denies pain currently. Fatigue set in 1-2 weeks after treatment. Feeling better now. Still mildly fatigued with the tamoxifen. Having hot flashes. Skin hyperpigmented 1-2 weeks after treatment per patient, resolved now. Patient changed into a gown for Dr. Jose to assess. Intentional weight loss- trying ot eat healthier. Patient has follow up with Dr. Heaton 9/25/24, mammogram due in November, patient to schedule. Patient reminded to make an appointment with Dr. Sparks. Follow up with Dr. Jose in July / August 2025.

## 2024-09-04 NOTE — PROGRESS NOTES
[facility]  Radiation Oncology Follow-up    Patient Name: Alisia Begr   MRN: [id number]  Referring Physician: Pascale Sparks MD; Montrell Heaton MD     Diagnosis: left breast IDC, grade 3, ER/IN+, HER2-, Ki-67 intermediate with +PHAM and +LVSI, pT1c (1.3 cm) N2a (, 1.2 cm) cM0     Oncologic History  2023: Left breast lumpectomy and axillary lymph node dissection.  She completed adjuvant ddAC-T x4.   24: completed adjuvant RT to the L breast and nodes 5000 cGy in 10 fractions, L breast boost 1000 cGy in 5 fractions all DIBH.  Endocrine therapy with Tamoxifen.       Interval History:  The patient is a 49 year old female with above diagnosis and treatment rendered who presents today for follow-up.    Finished RT in July  Tolerating Tamoxifen, has some fatigue and hot flashes  No breast issues, decent ROM, does her stretching exercises    Medications  Current Outpatient Medications   Medication Sig Dispense Refill    tamoxifen 20 MG Oral Tab Take 1 tablet (20 mg total) by mouth daily. 90 tablet 3    carBAMazepine 200 MG Oral Tab Take 1 tablet (200 mg total) by mouth 2 (two) times daily.      losartan 50 MG Oral Tab Take 1 tablet (50 mg total) by mouth at bedtime.         Physical Exam  Vitals:    24 0746   BP: 121/71   Pulse: 64   Resp: 16   Temp: 97.8 °F (36.6 °C)       ECO    Gen: NAD  HEENT: NCAT, EOMI  Neck: no LAD  CV: RRR  Lungs: CTAB  Ext: wwp  Neuro: CN II-XII grossly intact  Breast: L breast with mild residual hyperpigmentation, no masses or LAD.     Assessment: 49 year old female with imaging detected L breast cancer. She underwent margin negative lumpectomy and ALND showing final pT1c N2a IDC, grade 3, ER/IN+, HER2- with +PHAM and +LVSI. She completed adjuvant ddAC-T followed by RT in 2024. She is doing well on endocrine therapy, ETHEL.    Plan:     -continue endocrine therapy per medonc  -pt to schedule imaging for Nov and see surg after  -RTC 1 year or sooner if  needed    Rosario Jose MD  Radiation Oncology    MDM low including chart review, exam, and time spent with the patient in counseling.

## 2024-09-12 NOTE — PROGRESS NOTES
Radiation Oncology Treatment Summary    Diagnosis: left breast IDC, grade 3, ER/OH+, HER2-, Ki-67 intermediate with +PHAM and +LVSI, pT1c (1.3 cm) N2a (4/25, 1.2 cm) cM0       IGRT: kv match, OSMS    Clinical Course: The treatment course was completed as prescribed. She tolerated RT well with expected side effects.    Follow-up: Return to clinic in 6-8w or sooner if needed. Continue follow-up with other physicians as planned.     For more information, or to request a detailed radiation treatment summary, please call [facility] Radiation Oncology at our [place] location, [phone].

## 2024-09-17 ENCOUNTER — OFFICE VISIT (OUTPATIENT)
Dept: HEMATOLOGY/ONCOLOGY | Facility: HOSPITAL | Age: 49
End: 2024-09-17
Attending: INTERNAL MEDICINE

## 2024-09-17 DIAGNOSIS — Z08 ENCOUNTER FOR FOLLOW-UP EXAMINATION AFTER COMPLETED TREATMENT FOR MALIGNANT NEOPLASM: Primary | ICD-10-CM

## 2024-09-17 DIAGNOSIS — Z85.3 PERSONAL HISTORY OF BREAST CANCER: ICD-10-CM

## 2024-09-17 DIAGNOSIS — Z71.9 COUNSELING, UNSPECIFIED: ICD-10-CM

## 2024-09-17 PROCEDURE — 99215 OFFICE O/P EST HI 40 MIN: CPT | Performed by: NURSE PRACTITIONER

## 2024-09-17 PROCEDURE — 99417 PROLNG OP E/M EACH 15 MIN: CPT | Performed by: NURSE PRACTITIONER

## 2024-09-17 NOTE — PROGRESS NOTES
I met with Alisia for a Survivorship Clinic visit to provide a survivorship care plan (SCP) and information related to post-treatment care. She is a 48 year old female who on 9/7/23 had a screening mammogram with noted left breast architectural distortion. On  9/15/23 diagnostic imaging was done and showed persistent architectural distortion in the upper central left breast.  Ultrasound  showed a 1.1 cm irregular hypoechoic lesion at 12:00, 2 cm from the nipple.  There was a left axillary lymph node measuring 1.3 cm with cortical thickening. On 9/22/23 left breast biopsies were done that showed: 12:00 mass showing invasive ductal carcinoma, grade 2, lymph node positive for metastatic breast cancer, ER+, IL+, HER2-, Ki-67 14%. Staging CT and bone scan were negative for distant disease. Breast MRI on 10/9/23 showed no other suspicious areas. Genetic testing was negative for pathogenic variants. On 11/6/23 Dr. Pascale Sparks performed a left breast lumpectomy and axillary lymph node dissection. Pathology showed invasive ductal carcinoma, grade 3 measuring 1.3 cm, DCIS, grade 2, extensive LVSI, negative margins and 4/25 lymph nodes with metastatic disease, staging of pT1c, pN2a, cM0, Stage IB. Dr. Montrell Heaton recommended adjuvant chemotherapy with ddAC-T x 4 cycles each. This completed on 5/8/24. Radiation therapy was recommended and was given by Dr. Rosario Jose to include the left breast, SCF and completed on 7/22/24. Dr. Heaton recommended endocrine therapy with Tamoxifen. She also discussed consideration of adjuvant Abemaciclib that will be reviewed again at a future visit. (See Oncology Treatment Summary SCP for details).      Current condition/issues: Alisia appears to be doing well post-treatment. She has been taking Tamoxifen for approximately two months. She had initially noted fatigue which has resolved. She now notes mild hot flashes and intermittent right hip discomfort. She is taking Carbamazepine for  trigeminal neuralgia and is aware that this may interact with Tamoxifen, so is tapering this on her own, now reduced to what she believes is 50mg/day. She has been referred to a neurologist by her PCP, to consider changing medication to Gabapentin, but she has not made appointment yet and is unsure if referral is in the [place] network, which she needs. She has recovered well from radiation therapy. She had seen PT for post-surgical issues which have resolved. She is using left arm precautions. She has complete ROM on left arm, just notes some tightness since radiation therapy. She is getting movement with Mukesh Chi. She has a compression sleeve to wear prn.    She denies anxiety or depression at this time and reports having good support from family and friends. She has been using [facility] exercise programs (Mukesh Chi, Qi Gong). She has changed her diet and is eating healthier with juicing, reduced sugar and red meat intake. She uses Zeolite powder daily. She has started swimming and walks a few times/week. She has lost 7 pounds since her last office visit with BMI of 33. She gets only 6 hours of sleep each night.     Survivorship Care Plan and letter: She was provided a hard copy of the SCP and a letter that outlined the purpose of the visit and plan.  We reviewed all elements of both documents. She is aware that a letter and SCP will be sent to her PCP, Dr. Kaykay Fraser.     Reviewed Cancer Surveillance and that Dr. Heaton will see her next week on 9/25/24. She may discuss further adjuvant treatment with Abemaciclib at that time. She will get bilateral mammogram on 11/20/24, then see Dr. Sparks on 12/4/24. She reports getting annual pelvic exam in 5/24, but cannot see record. She will be due for next pelvic exam in 5/25 and plans to use her PCP. She could see Dr. Jose in one year 9/25. She should see PT again if her ROM changes.     Reviewed Schedule of other clinic visits with Primary Care and  specialists: Dr. Fraser will continue to manage all general health care recommended for age and gender including cancer screening tests.  She could consider colonoscopy at this time. She was referred to Neurology to evaluate change from Carbamazepine to Gabapentin and was encouraged to get this scheduled.     Reviewed concerning symptoms that she should report to any Provider.      Reviewed possible late and long-term effects related to the treatment that was received.  We reviewed care of the surgical arm and management of hormone related side effects including vaginal dryness if it were to occur.  .     Reviewed common cancer survivor issues and resources available.  She already uses [employer] and other programs she could consider include stress management, nutrition, exercise, psychosocial support. Various survivorship resources were provided to use going forward as needed (see below).     Reviewed Lifestyle/behaviors that can affect ongoing health, including the risk for the cancer coming back or developing another cancer.  We reviewed importance of physical activity including aerobic, strength training and weight bearing exercise.  We reviewed a plant based diet.  We reviewed skin care and sun safety.     The patient received a take-home folder that included the following survivorship resources:   -SCP and patient letter  -Breast Survivorship Guide   -[facility] \[facility]\""-education, support groups, special programs, nutrition and exercise classes, movement classes, mind/body programs, survivorship programs, individual counseling  -[facility] [facility]  -Medical Fitness Program  -Lymphedema Prevention  -[facility]  -ChooseMyPlate.gov handout-nutrition, physical activity  -ACS Cancer Screening Guideline  -Vaginal dryness resources  -Websites: American Cancer Society, Living Beyond Breast Cancer, Facebook: [facility], [employer], Endocrine Therapies, [employer] for  your Life, ACS National Survivorship Network, National Coalition for Cancer Survivorship     The patient was given the opportunity to ask questions.  She had a few questions and verbalized understanding of the information we discussed today.  My total time spent caring for the patient on the day of the encounter: 90 minutes (10 minutes reviewing chart, 60 minutes of face to face counseling regarding survivorship education, review of care plan and additional resources and 20 minutes of documentation).  The patient was encouraged to call with any further questions.   Magalys Cerda, APRN-[id number]

## 2024-09-25 ENCOUNTER — OFFICE VISIT (OUTPATIENT)
Dept: HEMATOLOGY/ONCOLOGY | Facility: HOSPITAL | Age: 49
End: 2024-09-25
Attending: INTERNAL MEDICINE

## 2024-09-25 VITALS
HEIGHT: 65 IN | DIASTOLIC BLOOD PRESSURE: 60 MMHG | WEIGHT: 197 LBS | OXYGEN SATURATION: 96 % | RESPIRATION RATE: 16 BRPM | BODY MASS INDEX: 32.82 KG/M2 | SYSTOLIC BLOOD PRESSURE: 104 MMHG | HEART RATE: 71 BPM | TEMPERATURE: 98 F

## 2024-09-25 DIAGNOSIS — Z79.810 ENCOUNTER FOR MONITORING TAMOXIFEN THERAPY: ICD-10-CM

## 2024-09-25 DIAGNOSIS — Z51.81 ENCOUNTER FOR MONITORING TAMOXIFEN THERAPY: ICD-10-CM

## 2024-09-25 DIAGNOSIS — T45.1X5A CHEMOTHERAPY-INDUCED PERIPHERAL NEUROPATHY (HCC): ICD-10-CM

## 2024-09-25 DIAGNOSIS — G62.0 CHEMOTHERAPY-INDUCED PERIPHERAL NEUROPATHY (HCC): ICD-10-CM

## 2024-09-25 DIAGNOSIS — C50.112 MALIGNANT NEOPLASM OF CENTRAL PORTION OF LEFT BREAST IN FEMALE, ESTROGEN RECEPTOR POSITIVE (HCC): Primary | ICD-10-CM

## 2024-09-25 DIAGNOSIS — Z08 ENCOUNTER FOR FOLLOW-UP SURVEILLANCE OF BREAST CANCER: ICD-10-CM

## 2024-09-25 DIAGNOSIS — Z17.0 MALIGNANT NEOPLASM OF CENTRAL PORTION OF LEFT BREAST IN FEMALE, ESTROGEN RECEPTOR POSITIVE (HCC): Primary | ICD-10-CM

## 2024-09-25 DIAGNOSIS — Z85.3 ENCOUNTER FOR FOLLOW-UP SURVEILLANCE OF BREAST CANCER: ICD-10-CM

## 2024-09-25 PROCEDURE — 99214 OFFICE O/P EST MOD 30 MIN: CPT | Performed by: INTERNAL MEDICINE

## 2024-09-25 NOTE — PROGRESS NOTES
HPI   Alisia Berg is a 49 year old female here at the request of NORBERTO SERNA MD for evaluation of   Encounter Diagnoses   Name Primary?    Malignant neoplasm of central portion of left breast in female, estrogen receptor positive (HCC) Yes    Encounter for monitoring tamoxifen therapy     Encounter for follow-up surveillance of breast cancer     Chemotherapy-induced peripheral neuropathy (HCC)        Patient here status post left-sided lumpectomy with axillary lymph node dissection on 11/6/2023.  Patient 425 positive lymph nodes.  She completed course of adjuvant chemotherapy with 4 cycles of dose dense AC-T followed by weekly Taxol.    She completed radiation therapy to the left breast and supraclavicular fossa.  Treatment started from 6/10/2024 and completed on 7/22/2024.    Compliance with SBE: Yes. Changes on SBE: Yes. States the left breast is heavier and bigger since after survivorship meeting.  Though from the bra but changed it.  States feels a little lump by the surgical scar by the arm pit.  Told Dr. Jose and told due to surgery.    Compliance with tamoxifen:  compliance all of the time    Side effects from tamoxifen: Yes hot flashes, No arthralgias or myalgias.      Other:  Yes:  she is taking carbamazepine every 2nd day and is going to neurology in December.      Peripheral neuropathy:  Yes, finger and toe tips, the same.   States improving.  States tingling and burning.      Good ROM in the LUE     LMP January 2024    Working on weight loss and has been eating healthy and lost 9 lbs in 2 months.  Doing Mukesh Chi at [employer].     ECOG PS  0    Review of Systems:   Review of Systems   Constitutional:  Negative for appetite change, fatigue and unexpected weight change.   Respiratory:  Negative for cough and shortness of breath.    Cardiovascular:  Negative for chest pain.   Gastrointestinal:  Negative for abdominal pain.   Endocrine: Positive for hot flashes.   Musculoskeletal:   Positive for arthralgias (hips when standing long time has tingling. Does not need pain meds) and back pain (upper back pain for a long time, think from port from slouching.). Negative for myalgias and neck pain.   Neurological:  Positive for numbness (at finger tips since after chemo.  Numbness and tingling.). Negative for dizziness and headaches.   Hematological:  Negative for adenopathy.   Psychiatric/Behavioral:  Positive for sleep disturbance.        Current Outpatient Medications   Medication Sig Dispense Refill    tamoxifen 20 MG Oral Tab Take 1 tablet (20 mg total) by mouth daily. 90 tablet 3    losartan 50 MG Oral Tab Take 1 tablet (50 mg total) by mouth at bedtime.      carBAMazepine 200 MG Oral Tab Take 1 tablet (200 mg total) by mouth 2 (two) times daily.       Allergies:   No Known Allergies    Past Medical History:    High blood pressure    Hypertension    Trigeminal neuralgia of left side of face     Past Surgical History:   Procedure Laterality Date    Lumpectomy left  2023    Needle biopsy right Right     bx done at age 20yrs old          x2     Social History     Socioeconomic History    Marital status:    Tobacco Use    Smoking status: Never    Smokeless tobacco: Never   Substance and Sexual Activity    Alcohol use: Never    Drug use: Never       Family History   Problem Relation Age of Onset    Breast Cancer Mother 34    Cancer Paternal Grandfather         unknown type    Ovarian Cancer Neg          PHYSICAL EXAM:    /60 (BP Location: Right arm, Patient Position: Sitting, Cuff Size: adult)   Pulse 71   Temp 97.9 °F (36.6 °C) (Oral)   Resp 16   Ht 1.651 m (5' 5\")   Wt 89.4 kg (197 lb)   LMP 12/15/2023 (Approximate)   SpO2 96%   BMI 32.78 kg/m²   Wt Readings from Last 6 Encounters:   24 89.4 kg (197 lb)   24 90.5 kg (199 lb 9.6 oz)   24 93.5 kg (206 lb 3.2 oz)   24 95.3 kg (210 lb)   24 94.5 kg (208 lb 6.4 oz)   05/15/24 95.3 kg (210 lb)      Physical Exam  General: Patient is alert, not in acute distress.  HEENT: EOMs intact. PERRL.   Neck: No JVD. No palpable lymphadenopathy. Neck is supple.  Chest: Clear to auscultation.  Breasts:  R breast no masses.  L breast with post operative changes and post RT changes.  Changes from surgery at the axilla with area at the scar of firm nodule at medial portion and above upper periareolar scar, surgical changes.  Has RT changes.  There is lower medial lymphedema of the L breast.  No lymphedema of the L arm. .    Heart: Regular rate and rhythm.   Abdomen: Soft, non tender with good bowel sounds.  Extremities: No edema.  Neurological: Grossly intact.   Lymphatics: There is no palpable lymphadenopathy throughout in the cervical, supraclavicular, axillary, or inguinal regions.  Psych/Depression: nl        ASSESSMENT/PLAN:     1. Malignant neoplasm of central portion of left breast in female, estrogen receptor positive (HCC)    2. Encounter for monitoring tamoxifen therapy    3. Encounter for follow-up surveillance of breast cancer    4. Chemotherapy-induced peripheral neuropathy (HCC)        1. Malignant neoplasm of central portion of left breast in female, estrogen receptor positive    Cancer Staging   Malignant neoplasm of central portion of left breast in female, estrogen receptor positive (HCC)  Staging form: Breast, AJCC 8th Edition  - Clinical stage from 9/29/2023: Stage IB (cT1c, cN1(f), cM0, G2, ER+, LA+, HER2-) - Signed by Montrell Heaton MD on 9/29/2023  - Pathologic stage from 11/15/2023: Stage IB (pT1c, pN2a, cM0, G2, ER+, LA+, HER2-) - Signed by Montrell Heaton MD on 11/15/2023    -Genetic testing was negative.    -Patient underwent a left-sided lumpectomy with sentinel lymph node which was positive, and completion axillary dissection on 11/6/2023.  She had 4 of 25 positive lymph nodes.    -Patient completed adjuvant treatment with dose dense Adriamycin Cytoxan followed by weekly Taxol.     -She  completed course of radiation therapy to the neck breast and supraclavicular fossa from 6/28/2024 to 7/22/2024    - Discussed the role of adjuvant hormonal therapy to decrease the risk of future recurrence of her breast cancer. Discussed with patient that given her initial staging, as well as residual disease, she is a candidate for 10 years of adjuvant endocrine therapy, as well as she is also a candidate for 2 years of Abemaciclib while the first 2 years of adjuvant endocrine therapy. Given that she is premenopausal, recommend ovarian function suppression as well.     -I discussed with patient that we also have available clinical trial, ERI-2, which is comparing a new oral SERD, camizestrant versus standard of care endocrine therapy. Information was provided to the patient for consideration. She is still considering.      - She declined abemaciclib.      - On tamoxifen, aware of interaction with carbamazepine lowering efficacy of tamoxifen.  Recommend d/w pcp switch to gabapentin for trigeminal neuralgia.  Gabapentin will also cover for the hot flashes.  Tolerating fairly well.  May take take flax seed oil 2-3 g a day.     - Hormone therapy for 10 yrs until end of July 2034.  Switch to AI once post menopausal.       - B mammogram in November of 2024.  Scheduled for 11/20/2024.  Will have mammogram now and if negative, refer to lymphedema clinic.     - Survivorship issues were addressed with the patient.  Patient-reported issues included insomnia, hot flashes , and financial concerns.  Recommended interventions as follows:  recommended flax seed oil for hot flashes, talk to SW regarding financial assistance.    Return in about 6 months (around 3/25/2025) for surveillance follow up monitoring medication.    No orders of the defined types were placed in this encounter.    MDM moderate risk.  Results From Past 48 Hours:  No results found for this or any previous visit (from the past 48 hour(s)).      Imaging &  Referrals:  None

## 2024-09-26 ENCOUNTER — HOSPITAL ENCOUNTER (OUTPATIENT)
Dept: MAMMOGRAPHY | Facility: HOSPITAL | Age: 49
Discharge: HOME | End: 2024-09-26
Attending: SURGERY

## 2024-09-26 DIAGNOSIS — Z17.0 MALIGNANT NEOPLASM OF CENTRAL PORTION OF LEFT BREAST IN FEMALE, ESTROGEN RECEPTOR POSITIVE (HCC): ICD-10-CM

## 2024-09-26 DIAGNOSIS — C50.112 MALIGNANT NEOPLASM OF CENTRAL PORTION OF LEFT BREAST IN FEMALE, ESTROGEN RECEPTOR POSITIVE (HCC): ICD-10-CM

## 2024-09-26 PROCEDURE — 77066 DX MAMMO INCL CAD BI: CPT | Performed by: SURGERY

## 2024-09-26 PROCEDURE — 77062 BREAST TOMOSYNTHESIS BI: CPT | Performed by: SURGERY

## 2024-10-09 ENCOUNTER — TELEPHONE (OUTPATIENT)
Dept: HEMATOLOGY/ONCOLOGY | Facility: HOSPITAL | Age: 49
End: 2024-10-09

## 2024-10-09 DIAGNOSIS — I89.0 LYMPHEDEMA OF BREAST: Primary | ICD-10-CM

## 2024-10-09 NOTE — TELEPHONE ENCOUNTER
Alisia said she was in to see Dr Heaton on 9/25/2024. She said Dr Heaton had her move up her mammogram. She had it done on 9/26/2024. \"Dr Heaton said if the results were good, she was going to refer me to the lymph edema clinic. I have not gotten a call yet.\"    I told Alisia I would forward the message to Dr Heaton and NEFTALY Newby.

## 2024-10-09 NOTE — TELEPHONE ENCOUNTER
Returned call to patient, let her know Dr. Heaton placed an order for the lymphedema clinic. Patient stated understanding and thanked me for the call.

## 2024-10-14 ENCOUNTER — OFFICE VISIT (OUTPATIENT)
Dept: NEUROLOGY | Facility: CLINIC | Age: 49
End: 2024-10-14
Payer: COMMERCIAL

## 2024-10-14 VITALS — BODY MASS INDEX: 31.34 KG/M2 | WEIGHT: 195 LBS | HEIGHT: 66 IN

## 2024-10-14 DIAGNOSIS — G62.0 POLYNEUROPATHY FOLLOWING CHEMOTHERAPY (HCC): ICD-10-CM

## 2024-10-14 DIAGNOSIS — G56.03 BILATERAL CARPAL TUNNEL SYNDROME: ICD-10-CM

## 2024-10-14 DIAGNOSIS — T45.1X5A POLYNEUROPATHY FOLLOWING CHEMOTHERAPY (HCC): ICD-10-CM

## 2024-10-14 DIAGNOSIS — G50.0 TRIGEMINAL NEURALGIA: Primary | ICD-10-CM

## 2024-10-14 PROCEDURE — 99204 OFFICE O/P NEW MOD 45 MIN: CPT | Performed by: OTHER

## 2024-10-14 PROCEDURE — 3008F BODY MASS INDEX DOCD: CPT | Performed by: OTHER

## 2024-10-14 RX ORDER — GABAPENTIN 100 MG/1
100 CAPSULE ORAL 3 TIMES DAILY
Qty: 270 CAPSULE | Refills: 3 | Status: SHIPPED | OUTPATIENT
Start: 2024-10-14

## 2024-10-14 NOTE — PROGRESS NOTES
[facility]OS[facility]ENC[facility] [address], [facility] [address]  [facility] [address]  [phone]            Neurology Initial Visit     Referred By: Dr. Coyle ref. provider found    Chief Complaint:   Chief Complaint   Patient presents with    Neuropathy     Patient presents here today to establish care, patient was referred by Dr Wendy Berg (oncolgist) to evaluate and treat Trigeminal Neuralgia, patient c/o Numbness and tingling in the finger tips.         HPI:     Alisia Berg is a 49 year old female, who presents for history of the general neurology, pain and numbness in the hands and feet.  Patient with a history of postlumpectomy for breast cancer, positive lymph nodes, had to undergo chemotherapy.  Has had some burning in the feet at first after the chemotherapy but has slowly improved.  On the first visit with me on October 2024 she was primarily reporting pain in the hands, skin is difficult to close the hands.  She describes tingling sensation up to the first 3-1/2 fingers of either hand.  Additionally patient has had history of for over 10 years since 2014 or so the regimen neurology on the left side, typical pain, sharp shooting electrical pain occurring every minute for a few seconds, triggered by touch.  Once she was placed on carbamazepine her pain has resolved and she was pain-free for a number of years.  However with requirement of tamoxifen, there was interaction oxcarbazepine and therefore patient was sent to our clinic to discuss alternative options for treatment of trigeminal neuralgia.  She already lowered the dose of carbamazepine to 50 mg every other day and still was headache free..     Past Medical History:    Breast CA (HCC)    Fall 2023    Cancer (HCC)    High blood pressure    Hypertension    Trigeminal neuralgia of left side of face       Past Surgical History:   Procedure Laterality Date    Chemotherapy      Lumpectomy left  11/06/2023    Needle biopsy right Right      bx done at age 20yrs old          x2    Radiation left         Social history:  History   Smoking Status    Never   Smokeless Tobacco    Never     History   Alcohol Use Never     History   Drug Use Unknown       Family History   Problem Relation Age of Onset    Breast Cancer Self 48    Breast Cancer Mother 34    Cancer Paternal Grandfather         unknown type    Ovarian Cancer Neg          Current Outpatient Medications:     gabapentin 100 MG Oral Cap, Take 1 capsule (100 mg total) by mouth 3 (three) times daily., Disp: 270 capsule, Rfl: 3    tamoxifen 20 MG Oral Tab, Take 1 tablet (20 mg total) by mouth daily., Disp: 90 tablet, Rfl: 3    losartan 50 MG Oral Tab, Take 1 tablet (50 mg total) by mouth at bedtime., Disp: , Rfl:     Allergies[1]    ROS:   As in HPI, the rest of the 14 system review was done and was negative      Physical Exam:  Vitals:    10/14/24 0734   Weight: 195 lb (88.5 kg)   Height: 66\"       General: No apparent distress, well nourished, well groomed.  Head- Normocephalic, atraumatic  Eyes- No redness or swelling  ENT- Hearing intake, normal glutition  Neck- No masses or adenopathy  Cv: pulses were palpable and normal, no cyanosis or edema     Neurological:     Mental Status- Alert and oriented x3.  Normal attention span and concentration  Thought process intact  Memory intact- recent and remote  Mood intact  Fund of knowledge appropriate for education and age    Language intact including: comprehension, naming, repetition, vocabulary    Cranial Nerves:    V. Facial sensation intact  VII. Face symmetric, no facial weakness  VIII. Hearing intact to whisper.  IX. Pallet elevates symmetrically.  XI. Shoulder shrug is intact  XII. Tongue is midline    Motor Exam:  Muscle tone normal  No atrophy or fasciculations  Strength- upper extremities 5/5 proximally and distally                  - lower  extremities 5/5 proximally and distally    Sensory Exam:  Light touch sensation- intact in all 4  extremities  Pinprick sensation was decreased in the first 3 and half fingers of both hands.    Rapid alternating movements intact    Gait:  Normal posture  Normal physiologic      Labs:    Lab Results   Component Value Date    TSH 1.876 05/29/2024     Lab Results   Component Value Date    HDL 68 (H) 07/09/2024     (H) 07/09/2024    TRIG 139 07/09/2024     Lab Results   Component Value Date    HGB 12.9 07/09/2024    HCT 37.1 07/09/2024    MCV 96.4 07/09/2024    WBC 3.0 (L) 07/09/2024    .0 07/09/2024      Lab Results   Component Value Date    BUN 8 (L) 05/29/2024    CA 10.2 05/29/2024    ALT 20 05/29/2024    AST 21 05/29/2024    ALB 4.7 05/29/2024     05/29/2024    K 4.1 05/29/2024     05/29/2024    CO2 29.0 05/29/2024      I have reviewed labs.      Assessment   1. Trigeminal neuralgia  Patient is pain-free for a number of years with the use of carbamazepine, unfortunately cannot continue taking it with requirement of tamoxifen.  Therefore after discussing different options we chose to start her on gabapentin since she also appears to be describing some evidence of carpal tunnel syndrome.      2. Bilateral carpal tunnel syndrome  Involving both hands, advised for the wearing braces first for the first 2 months if not sufficient and EMG will be considered  In consideration for the surgery.    3. Polyneuropathy following chemotherapy (HCC)  Described immediately after chemotherapy, but appears to have improved significantly since then.           Education and counseling provided to patient. Instructed patient to call my office or seek medical attention immediately if symptoms worsen.  Patient verbalized understanding of information given. All questions were answered. All side effects of drugs were discussed.     Return to clinic in: Return in about 3 months (around 1/14/2025).    Shailesh Luna MD           [1] No Known Allergies

## 2024-12-02 ENCOUNTER — SOCIAL WORK SERVICES (OUTPATIENT)
Dept: HEMATOLOGY/ONCOLOGY | Facility: HOSPITAL | Age: 49
End: 2024-12-02

## 2024-12-02 NOTE — PROGRESS NOTES
VERITO received a request from [employer] requesting medical records for the period of August 1, 2024 through present. VERITO faxed oncology records to [employer] at [phone].

## 2024-12-02 NOTE — PROGRESS NOTES
VERITO received the patient's application for financial assistance. VERITO forwarded the application to the financial assistance department at [email or web address].

## 2024-12-04 ENCOUNTER — OFFICE VISIT (OUTPATIENT)
Age: 49
End: 2024-12-04

## 2024-12-04 ENCOUNTER — TELEPHONE (OUTPATIENT)
Dept: PHYSICAL THERAPY | Facility: HOSPITAL | Age: 49
End: 2024-12-04

## 2024-12-04 VITALS
OXYGEN SATURATION: 98 % | RESPIRATION RATE: 16 BRPM | SYSTOLIC BLOOD PRESSURE: 129 MMHG | BODY MASS INDEX: 32 KG/M2 | TEMPERATURE: 98 F | WEIGHT: 195.81 LBS | DIASTOLIC BLOOD PRESSURE: 84 MMHG

## 2024-12-04 DIAGNOSIS — Z17.0 MALIGNANT NEOPLASM OF CENTRAL PORTION OF LEFT BREAST IN FEMALE, ESTROGEN RECEPTOR POSITIVE (HCC): ICD-10-CM

## 2024-12-04 DIAGNOSIS — C50.112 MALIGNANT NEOPLASM OF CENTRAL PORTION OF LEFT BREAST IN FEMALE, ESTROGEN RECEPTOR POSITIVE (HCC): ICD-10-CM

## 2024-12-04 DIAGNOSIS — I89.0 LYMPHEDEMA OF BREAST: Primary | ICD-10-CM

## 2024-12-09 ENCOUNTER — OFFICE VISIT (OUTPATIENT)
Dept: PHYSICAL THERAPY | Facility: HOSPITAL | Age: 49
End: 2024-12-09
Attending: SURGERY

## 2024-12-09 DIAGNOSIS — I89.0 LYMPHEDEMA OF BREAST: Primary | ICD-10-CM

## 2024-12-09 PROCEDURE — 97530 THERAPEUTIC ACTIVITIES: CPT | Performed by: PHYSICAL THERAPIST

## 2024-12-09 PROCEDURE — 97161 PT EVAL LOW COMPLEX 20 MIN: CPT | Performed by: PHYSICAL THERAPIST

## 2024-12-09 PROCEDURE — 97140 MANUAL THERAPY 1/> REGIONS: CPT | Performed by: PHYSICAL THERAPIST

## 2024-12-09 NOTE — PROGRESS NOTES
Referring Provider:  Clare  Diagnosis: Lymphedema of breast (I89.0)     Date of onset: August 1, 2024 Evaluation Date: 12/9/2024         LYMPHEDEMA EVALUATION:        PATIENT SUMMARY   Alisia Berg is a 49 year old female who presents to therapy today for lymphedema evaluation    History of current condition: L breast cancer. 11/6/2023 L lumpectomy w/ ALND (4+/25). + radiation.  Pt reporting after radiation she started wearing sports bra and noticed swelling starting   Pain level reports no pain     Current functional limitations: difficulty wearing bra, at risk for infection    Alisia describes prior level of function as independent w/ ADLs    Social History: works as  \"but I was fired when I was having radiation\", pt currently unemployed, has financial assistance through the hospital  Hand Dominance: right handed  Pt goals include reduce swelling     Are you being hurt, frightened, demeaned, or taken advantage of by anyone at your home or in your life?  No        Have you recently had thoughts of hurting yourself?  No    Have you tried to hurt yourself in the past?  No        Past medical history was reviewed with Alisia.   Past Medical History:    Breast CA (HCC)    Fall 2023    Cancer (HCC)    High blood pressure    Hypertension    Trigeminal neuralgia of left side of face         Precautions:  HTN, L breast cancer  Education or treatment limitation: unemployed/no-insurance, relies on financial assistance  Rehab Potential:good        ASSESSMENT:  Alisia presents to [facility] evaluation with swelling L breast. Swelling consistent w/ dx of lymphedema. Slight decreased scap strength.   Reason for lymphedema: Secondary Lymphedema, appears to be due to breast cancer   Stage of lymphedema: 2  Phase of treatment: Phase 1: Reduction Phase  Does the patient have a compression garment: yes     Pt and therapist discussed evaluation findings, lymphedema education, POC and self  care/management. Skilled Physical Therapy is medically necessary for stretching, strengthening, posture re-education,  Complete Decongestive Therapy to reduce swelling and decrease risk of infection, garment prescription , compression device prescription and education/self management.        OBJECTIVE:    12/9/2024 (Evaluation):    AROM/Strength:  B shld AROM WFL, strength WFL, B scap strength grossly 4-/5    Edema/Tissue Observations:    L breast swelling  - hyperpigmentation, hyperplasia, + pitting, decreased scar mobility   No significant arm swelling noted   + impingement left shoulder w/ prolonged overhead positioning     Trunk Measurement:  - trunk circumference (Level of L nipple); 110 cm    ( prior to radiation: 111.1 cm     Stemmer's Sign: negative   Arm Volume Measurements:       12/9/2024     6:00 AM 2/22/2024     8:00 AM 12/5/2023     6:00 PM 10/25/2023     3:00 PM   Lymphedema Calculations   DATE MEASURED 12/9/2024 2/22/2024 12/5/2023 10/25/2023   LOCATION/MEASUREMENTS [facility]   PATIENT POSITION  supine 1 pillow supine 1 pillow supine 1 pillow supine 1 pillow   LIMB POSITION 75 deg abduction 75 deg abduction 75 deg abduction 75 deg abduction   STARTING POINT 17cm from 3rd cuticle 17cm from 3rd cuticle 17cm from 3rd cuticle 17cm from 3rd cuticle   RIGHT HAND VOLUME 21.7cm across palm 21.7cm across palm 21.7cm across palm 21.7cm across palm   LEFT HAND VOLUME 21.7cm across palm 21.7cm across palm 21.7cm across palm 21.7cm across palm   MEASUREMENT A 16.3 16.5 16.5 16.5   MEASUREMENT B 19.2 19.7 20.1 20.2   MEASUREMENT C 23.3 23.8 24.5 24.7   MEASUREMENT D 26.3 26.3 27.8 27.8   MEASUREMENT E 27.8 28.1 28.5 28.1   MEASUREMENT F 29.4 30.1 31 31   MEASUREMENT G 30.8 31.9 32.8 32.8   MEASUREMENT H 32.3 34.3 35.8 36   MEASUREMENT I 37.3 38.3 41.3 41.2    TOTAL VOLUME  [id number] 2044.16769 5708.79428 [phone]   DATE MEASURED 12/9/2024 2/22/2024 12/5/2023 10/25/2023   LOCATION/MEASUREMENTS MELLISSA MALLOY  RUE   MEASUREMENT A 16.5 17.1 17.1 17   MEASUREMENT B 18.8 20.4 20.4 20.6   MEASUREMENT C 23.3 24.5 24.7 24.8   MEASUREMENT D 26 27.3 27.5 27.7   MEASUREMENT E 27.3 28 28.2 28.2   MEASUREMENT F 27.8 29.1 30.3 30.5   MEASUREMENT G 30.3 30.4 32 32.6   MEASUREMENT H 34 34.6 35.6 35.8   MEASUREMENT I 38.6 38.4 40.2 39.8    TOTAL VOLUME  [id number] 2540.02196 2677.09232 2705.03787   % DIFFERENCE 0.50070 0.96908 2.89495 1.5668          Lymphedema Life Impact Scale: Evaluation Score 12/9/2024: NA pt w/ breast swelling        Today’s Treatment and Response:   Date 12/9/2024 Date: * Date: * Date: * Date: * Date: *   Visit # 1/10  Certification From: 12/9/2024  To:3/9/2025    Visit # 2/* Visit: #3/* Visit: #4/* Visit: #5/* Visit: #6/*   Manual Therapy (30 minutes)    MLD: neck sequence, abd sequence, L inguinal, A-I, R axilla, A-A, L axilla, L breast. Deep technique on breast.     Encouraged pt to massage/apply lotion L breast 2x/wk.        Compression:  - pt has compression sleeve but did not bring it  - pt wearing compression sports bra- it does fit well but only provides mild compression of the breast  - fabricated foam compression pad to wear over left inferior and medial breast when wearing compression sports bra  - pt instr to bring her compression garments and all compression bras she has for assessment            There Ex (  minutes):            ThereAct ( 10 min):       Self-Care:  Management/Education: Explained Lymphedema diagnosis; informed patient of lymphedema precautions and risk reduction practices, and importance of skin care. Discussed and demonstrated    Discussed she may need to purchase new compression garments (will assess all her garments next visit), even if garments still good, they will need to be replaced every 6 months                   Goals (discussed and planned with patient involvement):      To be met in 10 visits:  1) Decrease swelling L breast by a total of 3 cm to decrease risks of  infection   2) Pt to be independent with self MLD, ROM exercises, and donning/doffing compression bandages/garments to facilitate swelling reduction and to be independent at self management once discharged  3) Increase B scap strength to at least 4/5 to improve ease of lifting and performing household chores      PLAN:  Frequency / Duration: Patient will be seen for 1-2 x/week or a total of 10 visits over a 90 day period. Frequency will decrease as symptoms improve.     Treatment will include: Complete Decongestive Therapy: Manual Therapy, Self-Care/Home Management Training, Therapeutic Exercise, Neuromuscular Re-education, Orthotic Management and Training        Charges: Physical Therapy Eval: Low Complexity, mm2, act1      Total Timed Treatment: 40 min     Total Treatment Time: 55 min     Based on clinical rationale and outcome measures, this evaluation is   stable (low eval)        Patient/Family/Caregiver was advised of these findings, precautions, and treatment options and has agreed to actively participate in planning and for this course of care.    Thank you for your referral. Please co-sign or sign and return this letter via fax as soon as possible to [phone]. If you have any questions, please contact me at Dept: [phone]    Sincerely,  Electronically signed by therapist: Codie Richardson PT, DPT, CLJOAN  Physician's certification required: Yes  I certify the need for these services furnished under this plan of treatment and while under my care.    X___________________________________________________ Date____________________    Certification From: 12/9/2024  To:3/9/2025

## 2024-12-12 ENCOUNTER — OFFICE VISIT (OUTPATIENT)
Dept: PHYSICAL THERAPY | Facility: HOSPITAL | Age: 49
End: 2024-12-12
Attending: SURGERY

## 2024-12-12 PROCEDURE — 97140 MANUAL THERAPY 1/> REGIONS: CPT | Performed by: PHYSICAL THERAPIST

## 2024-12-12 NOTE — PATIENT INSTRUCTIONS
Self- Lymphatic Massage for LEFT Arm, Breast or Trunk Lymphedema     The lymphatic system is part of our circulatory system. It helps balance the fluids of our body and plays an important role in our immune function. Every day our lymphatic system absorbs fluid, protein molecules and waste. Waste products are filtered and destroyed in the lymph nodes and eliminated by the body. The remaining lymph fluid and protein molecules are filtered and cleansed in the lymph nodes and are returned to the heart to join the blood.     We have lymph nodes all over our body, but we have large clusters or chains of lymph nodes in our neck, under our arms and in our groin. Lymph fluid normally flows in certain pathways. For example, lymph fluid from your arm, trunk and breast would normally be filtered and drained by the lymph nodes under your arm. If you have had some lymph nodes removed or radiated under your arm, you may be at risk of developing swelling or lymphedema in your arm, trunk, breast, or back.     Manual Lymph Drainage, or MLD, is a specialized type of gentle massage that is designed to aid lymphatic circulation. It helps move excess lymph fluid from an area that is swollen (or is at risk of becoming swollen), such as your arm, trunk or breast, into an area where the lymph nodes are working at their full capacity, for example, to the neck, unaffected underarm or groin nodes.  The pressure of your hands on your skin should be just enough to stretch the skin. It should be very light. Hands should be flat allowing more contact with the skin. This is how you stimulate the lymph vessels. If you can feel your muscles underneath your fingers, then you are too deep.    When doing self-massage remember:  · Support your affected arm on a table or with pillows, allowing your shoulders to relax.  · Never strain your shoulders, neck, arm or hand when doing self- massage  · Keep your hands soft and relaxed, use the flats of hands  and fingers instead of finger tips  · Use a light pressure  · Gently stretch the skin as far as it goes naturally and then release.  · Massage towards the unaffected or alternate lymph nodes  · Self-massage should always be pain free.  · Self-massage may be done while seated or lying down.  · Do not do self-massage if you have an infection.     Do not stroke across your chest if you currently have a port-a-cath or if you have had treatment for breast cancer on both breasts.                              The following massage is based on Manual Lymphatic Drainage (MLD)    1. Deep Breathing  A very important part of your self-care is deep breathing. There are lymph vessels deep in the abdomen that get stimulated by the contraction of the diaphragm when we breathe deeply. You can also practice deep breathing anytime!  · Place the palms/flats of your hands on your stomach  · Slowly, breathe deeply through your nose, allowing the stomach to expand  · Breathe out slowly through pursed lips, allowing the stomach to flatten  · Repeat 5 times with a short rest between each breath to avoid dizziness        2. Start at your neck  · Place the flats your fingers on the side of your neck just above your collarbone. Some people find it more comfortable to cross their hands when they do this stroke.   · Place your 2nd and 3rd fingers just above your collarbone on either side of your neck and stretch the skin just as far as it naturally goes. Stroke down towards your collarbone and inward. This stroke would look like two “J” strokes facing one another. This motion helps lymph fluid drain back to the heart.   · Repeat 15 times. DO BOTH SIDES                3. Side of neck  · Place your flat hands on the side of your neck just under your ears and   · Gently stretch the skin down towards your collarbone and release  · Try to stroke your neck in a slow, gentle and rhythmical manner.  · Repeat this stroke 10 to 15 times. DO BOTH SIDES  ·  Remember to keep your pressure light      4. Back of neck  · Place your flat hands on the back of your neck just below your hairline on either side of your spine.   · Stretch the skin towards the spine and then down toward base of neck and release.   · Repeat 10-15 times. DO BOTH SIDES        5. Chest Preparation  In preparation to redirect fluid in the chest area you gently pump the underarm by   · placing your palm against your underarm rolling your palm up towards your head and into your body.   · Pump or knead your underarm about 10 to 15 times. DO BOTH SIDES        6. Chest   · Gently massage your chest - in a sweeping motion - remembering to gently stretch the skin as far as it naturally goes and release.   · Place your hand by your LEFT underarm, above any scar and lightly sweep your hand across your chest over to your RIGHT underarm.   · Repeat this motion 10 to 15 times.   · Try to find the most comfortable position for your hand without straining your wrist too much.   · You may need to use both your hands.   · Also, try to do the massage when you are comfortably warm. Our muscles are more flexible when our body is warm.            7A. Prepare the groin  · We also want to direct the fluid from your upper arm and the side of your chest down the side of your body toward your groin.   · Pump, as you did with your underarm in #5, at your LEFT groin  · Place the flats of your hands over the crease at the top of your leg and pump gently.   · Repeat 15 times        7B. Continue with the arm comfortably supported and slightly elevated.   · If you have difficulty raising your arm, try bringing your arm forward.   · From the inner part of the LEFT upper arm, stroke around the outside of your underarm (remember you want to re-direct fluid around the underarm where lymph nodes have been removed and/or radiated) and continue the stroke from the side of the chest downward toward the groin.   · Remember to stretch the  skin gently as far as it will go.   · Repeat 15 times.      7C. Chest to groin  · Massage from your LEFT under arm, below any scar, down the side of your body to your groin area.   · Remember to stretch the skin and release.   · Repeat 10 to 15 times.              8. Breast        - If breast “firm” or “hard”, massage breast firmly    - softly massage breast mostly towards RIGHT axilla  - the outer and lower part of breast softly massage towards LEFT groin      9.  Upper Arm  · Starting with your LEFT shoulder, gently massage towards your neck and if it is not too much of a strain on your wrist, stroke across the back of your shoulder to your neck.   · Try to gently stretch the skin, towards your neck and release.  · Repeat 15 times.     · Lymphatic fluid in the upper arm normally flows into the underarm. We want to redirect this fluid to the back of your upper arm so we can move it into your neck and opposite underarm.   · Massage your whole upper arm from the inside of your arm to the top of your upper arm.   · Repeat 15 times    · Now massage from the back of your arm to the top of your upper arm.   · Stretch the skin and release.   · Repeat 15 times      10. Arm  · Gently massage your entire LEFT arm towards your head.   · Start with your shoulder, and then move to your upper arm elbow and forearm.   · Try to gently stretch the skin,   · push the fluid upwards and release.                11. Hand and Fingers  If your hand is swollen   · massage the back and palm of your hand.   · Gently stretch the skin, towards the forearm and release.   If your fingers are swollen   · place your index finger and thumb of your unaffected hand on the base of your finger and gently stretch the skin towards your hand.   · Massage the entire finger

## 2024-12-12 NOTE — PROGRESS NOTES
Referring Provider:  Clare  Diagnosis: Lymphedema of breast (I89.0)     Date of onset: August 1, 2024 Evaluation Date: 12/9/2024         LYMPHEDEMA TREATMENT:             Past medical history was reviewed with Alisia.   Past Medical History:    Breast CA (HCC)    Fall 2023    Cancer (HCC)    High blood pressure    Hypertension    Trigeminal neuralgia of left side of face         Precautions:  HTN, L breast cancer  Education or treatment limitation: unemployed/no-insurance, relies on financial assistance  Rehab Potential:good      PAIN: no pain    SUBKECTIVE: \"It is softer\"    OBJECTIVE:    12/9/2024 (Evaluation):    AROM/Strength:  B shld AROM WFL, strength WFL, B scap strength grossly 4-/5    Edema/Tissue Observations:    L breast swelling  - hyperpigmentation, hyperplasia, + pitting, decreased scar mobility   No significant arm swelling noted   + impingement left shoulder w/ prolonged overhead positioning     Trunk Measurement:  - trunk circumference (Level of L nipple); 110 cm    ( prior to radiation: 111.1 cm )    Stemmer's Sign: negative   Arm Volume Measurements:       12/9/2024     6:00 AM 2/22/2024     8:00 AM 12/5/2023     6:00 PM 10/25/2023     3:00 PM   Lymphedema Calculations   DATE MEASURED 12/9/2024 2/22/2024 12/5/2023 10/25/2023   LOCATION/MEASUREMENTS [facility]   PATIENT POSITION  supine 1 pillow supine 1 pillow supine 1 pillow supine 1 pillow   LIMB POSITION 75 deg abduction 75 deg abduction 75 deg abduction 75 deg abduction   STARTING POINT 17cm from 3rd cuticle 17cm from 3rd cuticle 17cm from 3rd cuticle 17cm from 3rd cuticle   RIGHT HAND VOLUME 21.7cm across palm 21.7cm across palm 21.7cm across palm 21.7cm across palm   LEFT HAND VOLUME 21.7cm across palm 21.7cm across palm 21.7cm across palm 21.7cm across palm   MEASUREMENT A 16.3 16.5 16.5 16.5   MEASUREMENT B 19.2 19.7 20.1 20.2   MEASUREMENT C 23.3 23.8 24.5 24.7   MEASUREMENT D 26.3 26.3 27.8 27.8   MEASUREMENT E 27.8 28.1 28.5  28.1   MEASUREMENT F 29.4 30.1 31 31   MEASUREMENT G 30.8 31.9 32.8 32.8   MEASUREMENT H 32.3 34.3 35.8 36   MEASUREMENT I 37.3 38.3 41.3 41.2    TOTAL VOLUME  [id number] 2544.45574 2738.57880 [id number]   DATE MEASURED 12/9/2024 2/22/2024 12/5/2023 10/25/2023   LOCATION/MEASUREMENTS RUE RUE RUE RUE   MEASUREMENT A 16.5 17.1 17.1 17   MEASUREMENT B 18.8 20.4 20.4 20.6   MEASUREMENT C 23.3 24.5 24.7 24.8   MEASUREMENT D 26 27.3 27.5 27.7   MEASUREMENT E 27.3 28 28.2 28.2   MEASUREMENT F 27.8 29.1 30.3 30.5   MEASUREMENT G 30.3 30.4 32 32.6   MEASUREMENT H 34 34.6 35.6 35.8   MEASUREMENT I 38.6 38.4 40.2 39.8    TOTAL VOLUME  [id number] 2540.15797 [id number] [phone]   % DIFFERENCE 0.70881 0.22710 2.66979 1.2091          Lymphedema Life Impact Scale: Evaluation Score 12/9/2024: NA pt w/ breast swelling        Today’s Treatment and Response:   Date 12/9/2024 Date: 12/12/2024 Date: * Date: * Date: * Date: *   Visit # 1/10  Certification From: 12/9/2024  To:3/9/2025    Visit # 2/10  Certification From: 12/9/2024  To:3/9/2025  Visit: #3/* Visit: #4/* Visit: #5/* Visit: #6/*   Manual Therapy (30 minutes)    MLD: neck sequence, abd sequence, L inguinal, A-I, R axilla, A-A, L axilla, L breast. Deep technique on breast.     Encouraged pt to massage/apply lotion L breast 2x/wk.        Compression:  - pt has compression sleeve but did not bring it  - pt wearing compression sports bra- it does fit well but only provides mild compression of the breast  - fabricated foam compression pad to wear over left inferior and medial breast when wearing compression sports bra  - pt instr to bring her compression garments and all compression bras she has for assessment     Manual Therapy (40 min)    Scar massage     MLD: neck sequence, abd sequence, L inguinal, A-I, R axilla, A-A, L axilla, L breast. Deep technique on breast.     Instr in self MLD (verbal, demo, handout)     Compression:  - pt brought in her other sports bra: all but 1 of  them provided good breast compression (after adjusting straps). Encouraged pt to wear these bras and use compression pad for inferior/medial breast (can move pad around to areas needed)    - donned compression sleeve and gauntlet- overall good fit though upper arm of sleeve not as tight but still wearable. Instr pt if she losing more weight then she will want new one       There Ex (  minutes):            ThereAct ( 10 min):       Self-Care:  Management/Education: Explained Lymphedema diagnosis; informed patient of lymphedema precautions and risk reduction practices, and importance of skin care. Discussed and demonstrated    Discussed she may need to purchase new compression garments (will assess all her garments next visit), even if garments still good, they will need to be replaced every 6 months               ASSESSMENT: Swelling of left breast decreasing     Goals (discussed and planned with patient involvement):      To be met in 10 visits:  1) Decrease swelling L breast by a total of 3 cm to decrease risks of infection   2) Pt to be independent with self MLD, ROM exercises, and donning/doffing compression bandages/garments to facilitate swelling reduction and to be independent at self management once discharged  3) Increase B scap strength to at least 4/5 to improve ease of lifting and performing household chores      PLAN:  Progress to scap strengthening  Frequency / Duration: Patient will be seen for 1-2 x/week or a total of 10 visits  .   over a 90 day period. Frequency will decrease as symptoms improve.     Treatment will include: Complete Decongestive Therapy: Manual Therapy, Self-Care/Home Management Training, Therapeutic Exercise, Neuromuscular Re-education, Orthotic Management and Training        Charges: mm3      Total Timed Treatment: 40 min     Total Treatment Time: 40 min

## 2024-12-18 ENCOUNTER — OFFICE VISIT (OUTPATIENT)
Dept: PHYSICAL THERAPY | Facility: HOSPITAL | Age: 49
End: 2024-12-18
Attending: SURGERY

## 2024-12-18 PROCEDURE — 97140 MANUAL THERAPY 1/> REGIONS: CPT | Performed by: PHYSICAL THERAPIST

## 2024-12-18 PROCEDURE — 97110 THERAPEUTIC EXERCISES: CPT | Performed by: PHYSICAL THERAPIST

## 2024-12-18 NOTE — PROGRESS NOTES
Referring Provider:  Clare  Diagnosis: Lymphedema of breast (I89.0)     Date of onset: August 1, 2024 Evaluation Date: 12/9/2024         LYMPHEDEMA TREATMENT:             Past medical history was reviewed with Alisia.   Past Medical History:    Breast CA (HCC)    Fall 2023    Cancer (HCC)    High blood pressure    Hypertension    Trigeminal neuralgia of left side of face         Precautions:  HTN, L breast cancer  Education or treatment limitation: unemployed/no-insurance, relies on financial assistance  Rehab Potential:good      PAIN: no pain    SUBKECTIVE: \"It's not as warm, it's getting better\"    OBJECTIVE:  12/18/2024:  Swelling L breast (hyperplasia, hyperpigmentation, + pitting)  Trunk Measurement:  - trunk circumference (Level of L nipple); 109.2 cm (IE: 110 cm)  Slight rash noted under left breast and in axilla (in skin folds)  Decreased scap strength      12/9/2024 (Evaluation):    AROM/Strength:  B shld AROM WFL, strength WFL, B scap strength grossly 4-/5    Edema/Tissue Observations:    L breast swelling  - hyperpigmentation, hyperplasia, + pitting, decreased scar mobility   No significant arm swelling noted   + impingement left shoulder w/ prolonged overhead positioning     Trunk Measurement:  - trunk circumference (Level of L nipple); 110 cm    ( prior to radiation: 111.1 cm )    Stemmer's Sign: negative   Arm Volume Measurements:       12/9/2024     6:00 AM 2/22/2024     8:00 AM 12/5/2023     6:00 PM 10/25/2023     3:00 PM   Lymphedema Calculations   DATE MEASURED 12/9/2024 2/22/2024 12/5/2023 10/25/2023   LOCATION/MEASUREMENTS [facility]   PATIENT POSITION  supine 1 pillow supine 1 pillow supine 1 pillow supine 1 pillow   LIMB POSITION 75 deg abduction 75 deg abduction 75 deg abduction 75 deg abduction   STARTING POINT 17cm from 3rd cuticle 17cm from 3rd cuticle 17cm from 3rd cuticle 17cm from 3rd cuticle   RIGHT HAND VOLUME 21.7cm across palm 21.7cm across palm 21.7cm across palm 21.7cm  across palm   LEFT HAND VOLUME 21.7cm across palm 21.7cm across palm 21.7cm across palm 21.7cm across palm   MEASUREMENT A 16.3 16.5 16.5 16.5   MEASUREMENT B 19.2 19.7 20.1 20.2   MEASUREMENT C 23.3 23.8 24.5 24.7   MEASUREMENT D 26.3 26.3 27.8 27.8   MEASUREMENT E 27.8 28.1 28.5 28.1   MEASUREMENT F 29.4 30.1 31 31   MEASUREMENT G 30.8 31.9 32.8 32.8   MEASUREMENT H 32.3 34.3 35.8 36   MEASUREMENT I 37.3 38.3 41.3 41.2    TOTAL VOLUME  [id number] 2544.37973 2738.21973 2738.69335   DATE MEASURED 12/9/2024 2/22/2024 12/5/2023 10/25/2023   LOCATION/MEASUREMENTS RUE RUE RUE RUE   MEASUREMENT A 16.5 17.1 17.1 17   MEASUREMENT B 18.8 20.4 20.4 20.6   MEASUREMENT C 23.3 24.5 24.7 24.8   MEASUREMENT D 26 27.3 27.5 27.7   MEASUREMENT E 27.3 28 28.2 28.2   MEASUREMENT F 27.8 29.1 30.3 30.5   MEASUREMENT G 30.3 30.4 32 32.6   MEASUREMENT H 34 34.6 35.6 35.8   MEASUREMENT I 38.6 38.4 40.2 39.8    TOTAL VOLUME  [id number] 2540.46553 2677.31292 2705.23626   % DIFFERENCE 0.21269 0.55527 2.58530 1.8646          Lymphedema Life Impact Scale: Evaluation Score 12/9/2024: NA pt w/ breast swelling        Today’s Treatment and Response:   Date 12/9/2024 Date: 12/12/2024 Date: 12/182024 Date: * Date: * Date: *   Visit # 1/10  Certification From: 12/9/2024  To:3/9/2025    Visit # 2/10  Certification From: 12/9/2024  To:3/9/2025  Visit: #3//10  Certification From: 12/9/2024  To:3/9/2025 Visit: #4/* Visit: #5/* Visit: #6/*   Manual Therapy (30 minutes)    MLD: neck sequence, abd sequence, L inguinal, A-I, R axilla, A-A, L axilla, L breast. Deep technique on breast.     Encouraged pt to massage/apply lotion L breast 2x/wk.        Compression:  - pt has compression sleeve but did not bring it  - pt wearing compression sports bra- it does fit well but only provides mild compression of the breast  - fabricated foam compression pad to wear over left inferior and medial breast when wearing compression sports bra  - pt instr to bring her  compression garments and all compression bras she has for assessment     Manual Therapy (40 min)    Scar massage     MLD: neck sequence, abd sequence, L inguinal, A-I, R axilla, A-A, L axilla, L breast. Deep technique on breast.     Instr in self MLD (verbal, demo, handout)     Compression:  - pt brought in her other sports bra: all but 1 of them provided good breast compression (after adjusting straps). Encouraged pt to wear these bras and use compression pad for inferior/medial breast (can move pad around to areas needed)    - donned compression sleeve and gauntlet- overall good fit though upper arm of sleeve not as tight but still wearable. Instr pt if she losing more weight then she will want new one Manual therapy (30 min)    Scar massage      MLD: neck sequence, abd sequence, L inguinal, A-I, R axilla, A-A, L axilla, L breast. Deep technique on breast.     Compression:  - stockinette over foam changed to 100% cotton material (in attempts to decrease irritation). Also instr pt to make sure she dries off any excess lotion after self MLD. If still w/ rash, stop using compression pad for a couple days (if that decreases the rash discussed she would need to purchase swell spot- showed samples of breast swell spots)        There Ex (  minutes):      There Ex (10 min)  - shld ER w/ GTB x 10  - GTB rows (narrow and wide) x 10x2  - shld ext w/ GTB x 10 x2       ThereAct ( 10 min):       Self-Care:  Management/Education: Explained Lymphedema diagnosis; informed patient of lymphedema precautions and risk reduction practices, and importance of skin care. Discussed and demonstrated    Discussed she may need to purchase new compression garments (will assess all her garments next visit), even if garments still good, they will need to be replaced every 6 months               ASSESSMENT: Swelling continues to decrease.  Initiated scap strengthening     Goals (discussed and planned with patient involvement):      To be met in  10 visits:  1) Decrease swelling L breast by a total of 3 cm to decrease risks of infection   2) Pt to be independent with self MLD, ROM exercises, and donning/doffing compression bandages/garments to facilitate swelling reduction and to be independent at self management once discharged  3) Increase B scap strength to at least 4/5 to improve ease of lifting and performing household chores       PLAN:       Progress to scap strengthening  Frequency / Duration: Patient will be seen for 1-2 x/week or a total of 10 visits  .   over a 90 day period. Frequency will decrease as symptoms improve.     Treatment will include: Complete Decongestive Therapy: Manual Therapy, Self-Care/Home Management Training, Therapeutic Exercise, Neuromuscular Re-education, Orthotic Management and Training        Charges: mm2, ex1     Total Timed Treatment: 40 min     Total Treatment Time: 40 min

## 2024-12-20 ENCOUNTER — OFFICE VISIT (OUTPATIENT)
Dept: PHYSICAL THERAPY | Facility: HOSPITAL | Age: 49
End: 2024-12-20
Attending: SURGERY

## 2024-12-20 PROCEDURE — 97140 MANUAL THERAPY 1/> REGIONS: CPT | Performed by: PHYSICAL THERAPIST

## 2024-12-20 NOTE — PROGRESS NOTES
Referring Provider:  Clare  Diagnosis: Lymphedema of breast (I89.0)     Date of onset: August 1, 2024 Evaluation Date: 12/9/2024         LYMPHEDEMA TREATMENT:             Past medical history was reviewed with Alisia.   Past Medical History:    Breast CA (HCC)    Fall 2023    Cancer (HCC)    High blood pressure    Hypertension    Trigeminal neuralgia of left side of face         Precautions:  HTN, L breast cancer  Education or treatment limitation: unemployed/no-insurance, relies on financial assistance  Rehab Potential:good      PAIN: no pain    SUBJECTIVE: \"the rash is better\"    OBJECTIVE:  12/18/2024:  Swelling L breast (hyperplasia, hyperpigmentation, + pitting)  Trunk Measurement:  - trunk circumference (Level of L nipple); 109.2 cm (IE: 110 cm)  Slight rash noted under left breast and in axilla (in skin folds)  Decreased scap strength      12/9/2024 (Evaluation):    AROM/Strength:  B shld AROM WFL, strength WFL, B scap strength grossly 4-/5    Edema/Tissue Observations:    L breast swelling  - hyperpigmentation, hyperplasia, + pitting, decreased scar mobility   No significant arm swelling noted   + impingement left shoulder w/ prolonged overhead positioning     Trunk Measurement:  - trunk circumference (Level of L nipple); 110 cm    ( prior to radiation: 111.1 cm )    Stemmer's Sign: negative   Arm Volume Measurements:       12/9/2024     6:00 AM 2/22/2024     8:00 AM 12/5/2023     6:00 PM 10/25/2023     3:00 PM   Lymphedema Calculations   DATE MEASURED 12/9/2024 2/22/2024 12/5/2023 10/25/2023   LOCATION/MEASUREMENTS [facility]   PATIENT POSITION  supine 1 pillow supine 1 pillow supine 1 pillow supine 1 pillow   LIMB POSITION 75 deg abduction 75 deg abduction 75 deg abduction 75 deg abduction   STARTING POINT 17cm from 3rd cuticle 17cm from 3rd cuticle 17cm from 3rd cuticle 17cm from 3rd cuticle   RIGHT HAND VOLUME 21.7cm across palm 21.7cm across palm 21.7cm across palm 21.7cm across palm   LEFT  HAND VOLUME 21.7cm across palm 21.7cm across palm 21.7cm across palm 21.7cm across palm   MEASUREMENT A 16.3 16.5 16.5 16.5   MEASUREMENT B 19.2 19.7 20.1 20.2   MEASUREMENT C 23.3 23.8 24.5 24.7   MEASUREMENT D 26.3 26.3 27.8 27.8   MEASUREMENT E 27.8 28.1 28.5 28.1   MEASUREMENT F 29.4 30.1 31 31   MEASUREMENT G 30.8 31.9 32.8 32.8   MEASUREMENT H 32.3 34.3 35.8 36   MEASUREMENT I 37.3 38.3 41.3 41.2    TOTAL VOLUME  [id number] 2544.79600 2738.75209 2738.60634   DATE MEASURED 12/9/2024 2/22/2024 12/5/2023 10/25/2023   LOCATION/MEASUREMENTS RUE RUE RUE RUE   MEASUREMENT A 16.5 17.1 17.1 17   MEASUREMENT B 18.8 20.4 20.4 20.6   MEASUREMENT C 23.3 24.5 24.7 24.8   MEASUREMENT D 26 27.3 27.5 27.7   MEASUREMENT E 27.3 28 28.2 28.2   MEASUREMENT F 27.8 29.1 30.3 30.5   MEASUREMENT G 30.3 30.4 32 32.6   MEASUREMENT H 34 34.6 35.6 35.8   MEASUREMENT I 38.6 38.4 40.2 39.8    TOTAL VOLUME  [id number] 2540.57836 2677.94213 2705.63442   % DIFFERENCE 0.36523 0.55624 2.48454 1.3444          Lymphedema Life Impact Scale: Evaluation Score 12/9/2024: NA pt w/ breast swelling        Today’s Treatment and Response:   Date 12/9/2024 Date: 12/12/2024 Date: 12/182024 Date: 12/20/2024 Date: * Date: *   Visit # 1/10  Certification From: 12/9/2024  To:3/9/2025    Visit # 2/10  Certification From: 12/9/2024  To:3/9/2025  Visit: #3//10  Certification From: 12/9/2024  To:3/9/2025 Visit: #4/10  Certification From: 12/9/2024  To:3/9/2025 Visit: #5/* Visit: #6/*   Manual Therapy (30 minutes)    MLD: neck sequence, abd sequence, L inguinal, A-I, R axilla, A-A, L axilla, L breast. Deep technique on breast.     Encouraged pt to massage/apply lotion L breast 2x/wk.        Compression:  - pt has compression sleeve but did not bring it  - pt wearing compression sports bra- it does fit well but only provides mild compression of the breast  - fabricated foam compression pad to wear over left inferior and medial breast when wearing compression sports  bra  - pt instr to bring her compression garments and all compression bras she has for assessment     Manual Therapy (40 min)    Scar massage     MLD: neck sequence, abd sequence, L inguinal, A-I, R axilla, A-A, L axilla, L breast. Deep technique on breast.     Instr in self MLD (verbal, demo, handout)     Compression:  - pt brought in her other sports bra: all but 1 of them provided good breast compression (after adjusting straps). Encouraged pt to wear these bras and use compression pad for inferior/medial breast (can move pad around to areas needed)    - donned compression sleeve and gauntlet- overall good fit though upper arm of sleeve not as tight but still wearable. Instr pt if she losing more weight then she will want new one Manual therapy (30 min)    Scar massage      MLD: neck sequence, abd sequence, L inguinal, A-I, R axilla, A-A, L axilla, L breast. Deep technique on breast.     Compression:  - stockinette over foam changed to 100% cotton material (in attempts to decrease irritation). Also instr pt to make sure she dries off any excess lotion after self MLD. If still w/ rash, stop using compression pad for a couple days (if that decreases the rash discussed she would need to purchase swell spot- showed samples of breast swell spots)   Manual Therapy (40 min)    Scar massage      MLD: neck sequence, abd sequence, L inguinal, A-I, R axilla, A-A, L axilla, L breast. Deep technique on breast.     Compression:  - compression bra w/ foam compression pad    Handout on self MLD given       There Ex (  minutes):      There Ex (10 min)  - shld ER w/ GTB x 10  - GTB rows (narrow and wide) x 10x2  - shld ext w/ GTB x 10 x2           ThereAct ( 10 min):       Self-Care:  Management/Education: Explained Lymphedema diagnosis; informed patient of lymphedema precautions and risk reduction practices, and importance of skin care. Discussed and demonstrated    Discussed she may need to purchase new compression garments  (will assess all her garments next visit), even if garments still good, they will need to be replaced every 6 months               ASSESSMENT: Swelling decreasing, breast must softer      Goals (discussed and planned with patient involvement):      To be met in 10 visits:  1) Decrease swelling L breast by a total of 3 cm to decrease risks of infection   2) Pt to be independent with self MLD, ROM exercises, and donning/doffing compression bandages/garments to facilitate swelling reduction and to be independent at self management once discharged  3) Increase B scap strength to at least 4/5 to improve ease of lifting and performing household chores       PLAN:       Frequency / Duration: Patient will be seen for 1-2 x/week or a total of 10 visits  .   over a 90 day period. Frequency will decrease as symptoms improve.     Treatment will include: Complete Decongestive Therapy: Manual Therapy, Self-Care/Home Management Training, Therapeutic Exercise, Neuromuscular Re-education, Orthotic Management and Training        Charges: mm3       Total Timed Treatment: 40 min     Total Treatment Time: 40 min

## 2024-12-23 ENCOUNTER — OFFICE VISIT (OUTPATIENT)
Dept: PHYSICAL THERAPY | Facility: HOSPITAL | Age: 49
End: 2024-12-23
Attending: SURGERY

## 2024-12-23 PROCEDURE — 97140 MANUAL THERAPY 1/> REGIONS: CPT | Performed by: PHYSICAL THERAPIST

## 2024-12-23 PROCEDURE — 97110 THERAPEUTIC EXERCISES: CPT | Performed by: PHYSICAL THERAPIST

## 2024-12-23 NOTE — PROGRESS NOTES
Referring Provider:  Clare  Diagnosis: Lymphedema of breast (I89.0)     Date of onset: August 1, 2024 Evaluation Date: 12/9/2024         LYMPHEDEMA TREATMENT:             Past medical history was reviewed with Alisia.   Past Medical History:    Breast CA (HCC)    Fall 2023    Cancer (HCC)    High blood pressure    Hypertension    Trigeminal neuralgia of left side of face         Precautions:  HTN, L breast cancer  Education or treatment limitation: unemployed/no-insurance, relies on financial assistance  Rehab Potential:good      PAIN: no pain    SUBJECTIVE: \"I'm good\"    OBJECTIVE:    12/23/22024:  Swelling L breast (hyperplasia, hyperpigmentation, + pitting)  Trunk Measurement:  - trunk circumference (Level of L nipple); 108.5 cm (IE: 110 cm)  Rash resolved     12/18/2024:  Swelling L breast (hyperplasia, hyperpigmentation, + pitting)  Trunk Measurement:  - trunk circumference (Level of L nipple); 109.2 cm (IE: 110 cm)  Slight rash noted under left breast and in axilla (in skin folds)  Decreased scap strength      12/9/2024 (Evaluation):    AROM/Strength:  B shld AROM WFL, strength WFL, B scap strength grossly 4-/5    Edema/Tissue Observations:    L breast swelling  - hyperpigmentation, hyperplasia, + pitting, decreased scar mobility   No significant arm swelling noted   + impingement left shoulder w/ prolonged overhead positioning     Trunk Measurement:  - trunk circumference (Level of L nipple); 110 cm    ( prior to radiation: 111.1 cm )    Stemmer's Sign: negative   Arm Volume Measurements:       12/9/2024     6:00 AM 2/22/2024     8:00 AM 12/5/2023     6:00 PM 10/25/2023     3:00 PM   Lymphedema Calculations   DATE MEASURED 12/9/2024 2/22/2024 12/5/2023 10/25/2023   LOCATION/MEASUREMENTS [facility]   PATIENT POSITION  supine 1 pillow supine 1 pillow supine 1 pillow supine 1 pillow   LIMB POSITION 75 deg abduction 75 deg abduction 75 deg abduction 75 deg abduction   STARTING POINT 17cm from 3rd cuticle  17cm from 3rd cuticle 17cm from 3rd cuticle 17cm from 3rd cuticle   RIGHT HAND VOLUME 21.7cm across palm 21.7cm across palm 21.7cm across palm 21.7cm across palm   LEFT HAND VOLUME 21.7cm across palm 21.7cm across palm 21.7cm across palm 21.7cm across palm   MEASUREMENT A 16.3 16.5 16.5 16.5   MEASUREMENT B 19.2 19.7 20.1 20.2   MEASUREMENT C 23.3 23.8 24.5 24.7   MEASUREMENT D 26.3 26.3 27.8 27.8   MEASUREMENT E 27.8 28.1 28.5 28.1   MEASUREMENT F 29.4 30.1 31 31   MEASUREMENT G 30.8 31.9 32.8 32.8   MEASUREMENT H 32.3 34.3 35.8 36   MEASUREMENT I 37.3 38.3 41.3 41.2    TOTAL VOLUME  [id number] 2544.99418 2738.03248 2738.43823   DATE MEASURED 12/9/2024 2/22/2024 12/5/2023 10/25/2023   LOCATION/MEASUREMENTS RUE RUE RUE RUE   MEASUREMENT A 16.5 17.1 17.1 17   MEASUREMENT B 18.8 20.4 20.4 20.6   MEASUREMENT C 23.3 24.5 24.7 24.8   MEASUREMENT D 26 27.3 27.5 27.7   MEASUREMENT E 27.3 28 28.2 28.2   MEASUREMENT F 27.8 29.1 30.3 30.5   MEASUREMENT G 30.3 30.4 32 32.6   MEASUREMENT H 34 34.6 35.6 35.8   MEASUREMENT I 38.6 38.4 40.2 39.8    TOTAL VOLUME  [id number] 2540.84440 2677.82424 2705.34783   % DIFFERENCE 0.50694 0.79606 2.99274 1.7863          Lymphedema Life Impact Scale: Evaluation Score 12/9/2024: NA pt w/ breast swelling        Today’s Treatment and Response:   Date 12/9/2024 Date: 12/12/2024 Date: 12/182024 Date: 12/20/2024 Date: 12/23/2024 Date: *   Visit # 1/10  Certification From: 12/9/2024  To:3/9/2025    Visit # 2/10  Certification From: 12/9/2024  To:3/9/2025  Visit: #3//10  Certification From: 12/9/2024  To:3/9/2025 Visit: #4/10  Certification From: 12/9/2024  To:3/9/2025 Visit: #5/10  Certification From: 12/9/2024  To:3/9/2025 Visit: #6/*   Manual Therapy (30 minutes)    MLD: neck sequence, abd sequence, L inguinal, A-I, R axilla, A-A, L axilla, L breast. Deep technique on breast.     Encouraged pt to massage/apply lotion L breast 2x/wk.        Compression:  - pt has compression sleeve but did not  bring it  - pt wearing compression sports bra- it does fit well but only provides mild compression of the breast  - fabricated foam compression pad to wear over left inferior and medial breast when wearing compression sports bra  - pt instr to bring her compression garments and all compression bras she has for assessment     Manual Therapy (40 min)    Scar massage     MLD: neck sequence, abd sequence, L inguinal, A-I, R axilla, A-A, L axilla, L breast. Deep technique on breast.     Instr in self MLD (verbal, demo, handout)     Compression:  - pt brought in her other sports bra: all but 1 of them provided good breast compression (after adjusting straps). Encouraged pt to wear these bras and use compression pad for inferior/medial breast (can move pad around to areas needed)    - donned compression sleeve and gauntlet- overall good fit though upper arm of sleeve not as tight but still wearable. Instr pt if she losing more weight then she will want new one Manual therapy (30 min)    Scar massage      MLD: neck sequence, abd sequence, L inguinal, A-I, R axilla, A-A, L axilla, L breast. Deep technique on breast.     Compression:  - stockinette over foam changed to 100% cotton material (in attempts to decrease irritation). Also instr pt to make sure she dries off any excess lotion after self MLD. If still w/ rash, stop using compression pad for a couple days (if that decreases the rash discussed she would need to purchase swell spot- showed samples of breast swell spots)   Manual Therapy (40 min)    Scar massage      MLD: neck sequence, abd sequence, L inguinal, A-I, R axilla, A-A, L axilla, L breast. Deep technique on breast.     Compression:  - compression bra w/ foam compression pad    Handout on self MLD given   Manual Therapy (35 min)    Trunk measurements     Scar massage      MLD: neck sequence, abd sequence, L inguinal, A-I, R axilla, A-A, L axilla, L breast. Deep technique on breast.     REviewed self MLD and  scar massage     Compression:  - compression bra w/ foam compression pad    There Ex (  minutes):      There Ex (10 min)  - shld ER w/ GTB x 10  - GTB rows (narrow and wide) x 10x2  - shld ext w/ GTB x 10 x2       There Ex (10 min)  - sidely windmills x 10  - sidely horiz abd/add x 10  - doorway pect stretch    ThereAct ( 10 min):       Self-Care:  Management/Education: Explained Lymphedema diagnosis; informed patient of lymphedema precautions and risk reduction practices, and importance of skin care. Discussed and demonstrated    Discussed she may need to purchase new compression garments (will assess all her garments next visit), even if garments still good, they will need to be replaced every 6 months               ASSESSMENT: Swelling continues to decrease. Progressed HEP      Goals (discussed and planned with patient involvement):      To be met in 10 visits:  1) Decrease swelling L breast by a total of 3 cm to decrease risks of infection   2) Pt to be independent with self MLD, ROM exercises, and donning/doffing compression bandages/garments to facilitate swelling reduction and to be independent at self management once discharged  3) Increase B scap strength to at least 4/5 to improve ease of lifting and performing household chores       PLAN:       Frequency / Duration: Patient will be seen for 1-2 x/week or a total of 10 visits  .   over a 90 day period. Frequency will decrease as symptoms improve.     Treatment will include: Complete Decongestive Therapy: Manual Therapy, Self-Care/Home Management Training, Therapeutic Exercise, Neuromuscular Re-education, Orthotic Management and Training        Charges: mm2, ex1       Total Timed Treatment: 45 min     Total Treatment Time: 45 min

## 2024-12-23 NOTE — PROGRESS NOTES
Breast Surgery Surveillance Visit    Diagnosis: Invasive ductal carcinoma, DCIS, left breast, s/p left breast lumpectomy with left axillary lymph node dissection     Stage:  Cancer Staging   Malignant neoplasm of central portion of left breast in female, estrogen receptor positive (HCC)  Staging form: Breast, AJCC 8th Edition  - Clinical stage from 9/29/2023: Stage IB (cT1c, cN1(f), cM0, G2, ER+, AK+, HER2-) - Signed by Montrell Heaton MD on 9/29/2023  - Pathologic stage from 11/15/2023: Stage IB (pT1c, pN2a, cM0, G2, ER+, AK+, HER2-) - Signed by Montrell Heaton MD on 11/15/2023    Disease Status:  Surgical treatment complete, chemotherapy and tamoxifen per medical oncology, radiation completed July 2024    History of Present Illness:   Ms. Alisia Berg is a 49 year old woman who presents with an imaging detected left breast cancer.  The patient denies any palpable masses, nipple discharge, skin changes or axillary symptoms.  She has no personal prior history of breast disease or biopsies.  She does have a family history of breast cancer.  She presented for her screening mammogram on September 7, 2023 and was found to have questionable architectural distortion of the left breast for which additional imaging was recommended.  Left diagnostic evaluation on September 15, 2023 confirmed a suspicious irregular lesion at 12:00, 2 cm from the nipple measuring up to 1.1 cm corresponding to area of distortion for which biopsy is recommended as well as a suspicious left axillary lymph node measuring 1.3 cm with cortical thickness elevation.  On September 22, 2023 she underwent biopsy of both the left breast mass at 12:00 in the left axillary lymph node and she was found to have fragments of invasive ductal carcinoma, grade 2 that was ER 85%, AK 98%, HER2/chani negative with a Ki-67 of 14% with positive metastatic disease in the left axillary lymph node.  She has undergone genetic testing the results of which are pending.   She has undergone metastatic work-up with CT and bone scan that was unremarkable.  Medical oncology has recommended upfront surgical management.  MRI was also performed that demonstrated a 1 cm area of enhancement at the site of the biopsy confirmed disease with no other findings bilaterally. She underwent left breast lumpectomy with left axillary lymph node dissection, which occurred without complication. She has completed her systemic therapy and radiation.  She continues to follow with medical oncology for management of the tamoxifen and denies any new clinical concerns since her last visit.  She had a surveillance bilateral diagnostic evaluation on 2024 that showed no suspicious findings.  She is here today for evaluation and recommendations for further therapy.        Past Medical History:    Breast CA (HCC)    Fall     Cancer (HCC)    High blood pressure    Hypertension    Trigeminal neuralgia of left side of face       Past Surgical History:   Procedure Laterality Date    Chemotherapy      Lumpectomy left  2023    Needle biopsy right Right     bx done at age 20yrs old          x2    Radiation left         Gynecological History:  Pt is a   Pt was 19 years old at time of first pregnancy.    She has cumulative breastfeeding history of 12 months.  She achieved menarche at age 14 and LMP 10/5/2023  She denies any history of hormone replacement therapy   She denies any history of oral contraceptive use   She denies infertility treatment to achieve pregnancy.    Medications:     gabapentin 100 MG Oral Cap Take 1 capsule (100 mg total) by mouth 3 (three) times daily. 270 capsule 3    tamoxifen 20 MG Oral Tab Take 1 tablet (20 mg total) by mouth daily. 90 tablet 3    losartan 50 MG Oral Tab Take 1 tablet (50 mg total) by mouth at bedtime.         Allergies:    No Known Allergies    Family History:   Family History   Problem Relation Age of Onset    Breast Cancer Self 48    Breast Cancer  Mother 34    Cancer Paternal Grandfather         unknown type    Ovarian Cancer Neg        She is not of Ashkenazi Sikhism ancestry.    Social History:  History   Alcohol Use Never       History   Smoking Status    Never   Smokeless Tobacco    Never       Ms. Alisia Berg is  with 2 children. She has 1 siblings. She is currently Employed full-time    Review of Systems:  General:   The patient denies, fever, chills, night sweats, fatigue, generalized weakness, change in appetite or weight loss.    HEENT:     The patient denies eye irritation, cataracts, redness, glaucoma, yellowing of the eyes, change in vision, color blindness, or +wearing contacts/glasses. The patient denies hearing loss, ringing in the ears, ear drainage, earaches, nasal congestion, nose bleeds, snoring, pain in mouth/throat, hoarseness, change in voice, facial trauma.    Respiratory:  The patient denies chronic cough, phlegm, hemoptysis, pleurisy/chest pain, pneumonia, asthma, wheezing, difficulty in breathing with exertion, emphysema, chronic bronchitis, shortness of breath or abnormal sound when breathing.     Cardiovascular:  There is no history of chest pain, chest pressure/discomfort, palpitations, irregular heartbeat, fainting or near-fainting, difficulty breathing when lying flat, SOB/Coughing at night, swelling of the legs or chest pain while walking.    Breasts:  See history of present illness    Gastrointestinal:     There is no history of difficulty or pain with swallowing, reflux symptoms, vomiting, dark or bloody stools, constipation, yellowing of the skin, indigestion, nausea, change in bowel habits, diarrhea, abdominal pain or vomiting blood.     Genitourinary:  The patient denies frequent urination, needing to get up at night to urinate, urinary hesitancy or retaining urine, painful urination, urinary incontinence, decreased urine stream, blood in the urine or vaginal/penile discharge.    Skin:    The patient denies rash,  itching, skin lesions, dry skin, change in skin color or change in moles.     Hematologic/Lymphatic:  The patient denies easily bruising or bleeding or persistent swollen glands or lymph nodes.     Musculoskeletal:  The patient denies muscle aches/pain, joint pain, stiff joints, neck pain, back pain or bone pain.    Neuropsychiatric:  There is no history of migraines or severe headaches, seizure/epilepsy, speech problems, coordination problems, trembling/tremors, fainting/black outs, dizziness, memory problems, loss of sensation/numbness, problems walking, weakness, tingling or burning in hands/feet. There is no history of abusive relationship, bipolar disorder, sleep disturbance, anxiety, depression or feeling of despair.    Endocrine:    There is no history of poor/slow wound healing, weight loss/gain, fertility or hormone problems, cold intolerance, thyroid disease.     Allergic/Immunologic:  There is no history of hives, hay fever, angioedema or anaphylaxis.    /84 (BP Location: Right arm, Patient Position: Sitting, Cuff Size: large)   Temp 98.1 °F (36.7 °C) (Temporal)   Resp 16   Wt 88.8 kg (195 lb 12.8 oz)   LMP 01/10/2024 (Approximate)   SpO2 98%   BMI 31.60 kg/m²     Physical Exam:  The patient is an alert, oriented, well-nourished and  well-developed woman who appears her stated age. Her speech patterns and movements are normal. Her affect is appropriate.    HEENT: The head is normocephalic. The neck is supple. The thyroid is not enlarged and is without palpable masses/nodules. There are no palpable masses. The trachea is in the midline. Conjunctiva are clear, non-icteric.    Chest: The chest expands symmetrically. The lungs are clear to auscultation.    Heart: The rhythm is regular.  There are no murmurs, rubs, gallops or thrills.    Breasts:  Her breasts are symmetrical with a cup size 40C.  Right breast: The skin, nipple ,and areola appear normal. There is no skin dimpling with movement of  the pectoralis. There is no nipple retraction. No nipple discharge can be elicited. The parenchyma is mildly nodular. There are no dominant masses in the breast. The axillary tail is normal.  Left breast:   The skin, nipple, and areola appear normal. There is no skin dimpling with movement of the pectoralis. There is no nipple retraction. No nipple discharge can be elicited. The parenchyma is mildly nodular. There are no dominant masses in the breast. The axillary tail is normal.  There is a well-healed incision with no palpable concerns.    Abdomen:  The abdomen is soft, flat and non tender. The liver is not enlarged. There are no palpable masses.    Lymph Nodes:  The supraclavicular, axillary and cervical regions are free of significant lymphadenopathy.    Back: There is no vertebral column tenderness.    Skin: The skin appears normal. There are no suspicious appearing rashes or lesions.    Extremities: The extremities are without deformity, cyanosis or edema.    Impression:   Ms. Alisia Berg is a 49 year old woman presents status post lumpectomy with axillary lymph node dissection with  left  Cancer Staging   Malignant neoplasm of central portion of left breast in female, estrogen receptor positive (HCC)  Staging form: Breast, AJCC 8th Edition  - Clinical stage from 9/29/2023: Stage IB (cT1c, cN1(f), cM0, G2, ER+, FL+, HER2-) - Signed by Montrell Heaton MD on 9/29/2023  - Pathologic stage from 11/15/2023: Stage IB (pT1c, pN2a, cM0, G2, ER+, FL+, HER2-) - Signed by Montrell Heaton MD on 11/15/2023    Recommendations:   I had a discussion with the Patient regarding her breast exam.  She is healing well since surgery with no signs of new or recurrent disease.  I reviewed the recent imaging which is concordant with her exam. I personally reviewed her pathology.  This confirmed a 1.3 cm tumor with negative margins and 4 out of 25 positive left axillary lymph nodes.  No further surgery is recommended at this time.   She has met with medical oncology who supervised her systemic therapy and she continues to follow them with management of her tamoxifen.  She will be due for her next surveillance in March 2025 with a left mammogram due at that time and we will plan for an MRI now given her extremely dense breast tissue.  She has lymphedema and therefore will be referred back to physical therapy for management.   She was given ample opportunity for questions and those questions were answered to her satisfaction. She was encouraged to contact the office with any questions or concerns prior to her next scheduled appointment.     This encounter lasted a total of 25 minutes, more than 50% of which was dedicated to the discussion of management options.

## 2024-12-26 ENCOUNTER — OFFICE VISIT (OUTPATIENT)
Dept: PHYSICAL THERAPY | Facility: HOSPITAL | Age: 49
End: 2024-12-26
Attending: SURGERY

## 2024-12-26 PROCEDURE — 97140 MANUAL THERAPY 1/> REGIONS: CPT | Performed by: PHYSICAL THERAPIST

## 2024-12-26 NOTE — PROGRESS NOTES
Referring Provider:  Clare  Diagnosis: Lymphedema of breast (I89.0)     Date of onset: August 1, 2024 Evaluation Date: 12/9/2024         LYMPHEDEMA TREATMENT:             Past medical history was reviewed with Alisia.   Past Medical History:    Breast CA (HCC)    Fall 2023    Cancer (HCC)    High blood pressure    Hypertension    Trigeminal neuralgia of left side of face         Precautions:  HTN, L breast cancer  Education or treatment limitation: unemployed/no-insurance, relies on financial assistance  Rehab Potential:good      PAIN: no pain    SUBJECTIVE: \"it feels better\"     OBJECTIVE:  12/26/2024:  Swelling L breast  Scap strength:   Rhomb: 4/5 B   Mid trap: 4/5 B     12/23/22024:  Swelling L breast (hyperplasia, hyperpigmentation, + pitting)  Trunk Measurement:  - trunk circumference (Level of L nipple); 108.5 cm (IE: 110 cm)  Rash resolved     12/18/2024:  Swelling L breast (hyperplasia, hyperpigmentation, + pitting)  Trunk Measurement:  - trunk circumference (Level of L nipple); 109.2 cm (IE: 110 cm)  Slight rash noted under left breast and in axilla (in skin folds)  Decreased scap strength      12/9/2024 (Evaluation):    AROM/Strength:  B shld AROM WFL, strength WFL, B scap strength grossly 4-/5    Edema/Tissue Observations:    L breast swelling  - hyperpigmentation, hyperplasia, + pitting, decreased scar mobility   No significant arm swelling noted   + impingement left shoulder w/ prolonged overhead positioning     Trunk Measurement:  - trunk circumference (Level of L nipple); 110 cm    ( prior to radiation: 111.1 cm )    Stemmer's Sign: negative   Arm Volume Measurements:       12/9/2024     6:00 AM 2/22/2024     8:00 AM 12/5/2023     6:00 PM 10/25/2023     3:00 PM   Lymphedema Calculations   DATE MEASURED 12/9/2024 2/22/2024 12/5/2023 10/25/2023   LOCATION/MEASUREMENTS [facility]   PATIENT POSITION  supine 1 pillow supine 1 pillow supine 1 pillow supine 1 pillow   LIMB POSITION 75 deg abduction  75 deg abduction 75 deg abduction 75 deg abduction   STARTING POINT 17cm from 3rd cuticle 17cm from 3rd cuticle 17cm from 3rd cuticle 17cm from 3rd cuticle   RIGHT HAND VOLUME 21.7cm across palm 21.7cm across palm 21.7cm across palm 21.7cm across palm   LEFT HAND VOLUME 21.7cm across palm 21.7cm across palm 21.7cm across palm 21.7cm across palm   MEASUREMENT A 16.3 16.5 16.5 16.5   MEASUREMENT B 19.2 19.7 20.1 20.2   MEASUREMENT C 23.3 23.8 24.5 24.7   MEASUREMENT D 26.3 26.3 27.8 27.8   MEASUREMENT E 27.8 28.1 28.5 28.1   MEASUREMENT F 29.4 30.1 31 31   MEASUREMENT G 30.8 31.9 32.8 32.8   MEASUREMENT H 32.3 34.3 35.8 36   MEASUREMENT I 37.3 38.3 41.3 41.2    TOTAL VOLUME  [id number] 2544.92889 2738.96284 2738.73124   DATE MEASURED 12/9/2024 2/22/2024 12/5/2023 10/25/2023   LOCATION/MEASUREMENTS RUE RUE RUE RUE   MEASUREMENT A 16.5 17.1 17.1 17   MEASUREMENT B 18.8 20.4 20.4 20.6   MEASUREMENT C 23.3 24.5 24.7 24.8   MEASUREMENT D 26 27.3 27.5 27.7   MEASUREMENT E 27.3 28 28.2 28.2   MEASUREMENT F 27.8 29.1 30.3 30.5   MEASUREMENT G 30.3 30.4 32 32.6   MEASUREMENT H 34 34.6 35.6 35.8   MEASUREMENT I 38.6 38.4 40.2 39.8    TOTAL VOLUME  [id number] 2540.41632 2677.66813 2705.69840   % DIFFERENCE 0.52163 0.04343 2.08005 1.5004          Lymphedema Life Impact Scale: Evaluation Score 12/9/2024: NA pt w/ breast swelling        Today’s Treatment and Response:   Date: 12/20/2024 Date: 12/23/2024 Date: 12/26/2024   Visit: #4/10  Certification From: 12/9/2024  To:3/9/2025 Visit: #5/10  Certification From: 12/9/2024  To:3/9/2025 Visit: #6/10  Certification From: 12/9/2024  To:3/9/2025   Manual Therapy (40 min)    Scar massage      MLD: neck sequence, abd sequence, L inguinal, A-I, R axilla, A-A, L axilla, L breast. Deep technique on breast.     Compression:  - compression bra w/ foam compression pad    Handout on self MLD given   Manual Therapy (35 min)    Trunk measurements     Scar massage      MLD: neck sequence, abd  sequence, L inguinal, A-I, R axilla, A-A, L axilla, L breast. Deep technique on breast.     REviewed self MLD and scar massage     Compression:  - compression bra w/ foam compression pad Manual therapy (45 min)    Assessed scap strength      Scar massage      MLD: neck sequence, abd sequence, L inguinal, A-I, R axilla, A-A, L axilla, L breast. Deep technique on breast.     Compression:  - pt wearing compression bra, compression sleeve/gauntlet. Instr importance of cont to wear compression sleeve prophylactically for 1 year  \"Prophylactic use of compression sleeves compared with the control group reduced and delayed the occurrence of arm swelling in women at high risk for lymphedema in the first year after surgery for breast cancer.\"  Mat et al. Prophylactic Use of Compression Sleeves Reduces the Incidence of Arm Swelling in Women at High Risk of Breast Cancer-Related Lymphedema: A Randomized Controlled Trial. Journal of Clinical Oncology. Accepted on January 7, 2022 and published at [email or web address] on February 2, 2022: DOI [email or web address]          There Ex (10 min)  - sidely windmills x 10  - sidely horiz abd/add x 10  - doorway pect stretch There Ex  - encouraged pt to cont w/ exercises              ASSESSMENT: Strength improved, swelling continues to decrease w/ softening of tissue      Goals (discussed and planned with patient involvement):      To be met in 10 visits:  1) Decrease swelling L breast by a total of 3 cm to decrease risks of infection   2) Pt to be independent with self MLD, ROM exercises, and donning/doffing compression bandages/garments to facilitate swelling reduction and to be independent at self management once discharged  3) Increase B scap strength to at least 4/5 to improve ease of lifting and performing household chores- MET        PLAN:       Frequency / Duration: Patient will be seen for 1-2 x/week or a total of 10 visits  .   over a 90 day period.  Frequency will decrease as symptoms improve.     Treatment will include: Complete Decongestive Therapy: Manual Therapy, Self-Care/Home Management Training, Therapeutic Exercise, Neuromuscular Re-education, Orthotic Management and Training        Charges: mm3        Total Timed Treatment: 45 min     Total Treatment Time: 45 min

## 2025-01-02 ENCOUNTER — OFFICE VISIT (OUTPATIENT)
Dept: PHYSICAL THERAPY | Facility: HOSPITAL | Age: 50
End: 2025-01-02
Attending: SURGERY

## 2025-01-02 PROCEDURE — 97140 MANUAL THERAPY 1/> REGIONS: CPT

## 2025-01-02 NOTE — PROGRESS NOTES
Referring Provider:  Clare  Diagnosis: Lymphedema of breast (I89.0)     Date of onset: August 1, 2024 Evaluation Date: 12/9/2024         LYMPHEDEMA TREATMENT:             Past medical history was reviewed with Alisia.   Past Medical History:    Breast CA (HCC)    Fall 2023    Cancer (HCC)    High blood pressure    Hypertension    Trigeminal neuralgia of left side of face         Precautions:  HTN, L breast cancer  Education or treatment limitation: unemployed/no-insurance, relies on financial assistance  Rehab Potential:good      PAIN: no pain    SUBJECTIVE: Pt reports that the massage helps.  \"I try doing the massage and it is hard to do the same as you do it.\"    OBJECTIVE:  12/26/2024:  Swelling L breast  Scap strength:   Rhomb: 4/5 B   Mid trap: 4/5 B     12/23/22024:  Swelling L breast (hyperplasia, hyperpigmentation, + pitting)  Trunk Measurement:  - trunk circumference (Level of L nipple); 108.5 cm (IE: 110 cm)  Rash resolved     12/18/2024:  Swelling L breast (hyperplasia, hyperpigmentation, + pitting)  Trunk Measurement:  - trunk circumference (Level of L nipple); 109.2 cm (IE: 110 cm)  Slight rash noted under left breast and in axilla (in skin folds)  Decreased scap strength      12/9/2024 (Evaluation):    AROM/Strength:  B shld AROM WFL, strength WFL, B scap strength grossly 4-/5    Edema/Tissue Observations:    L breast swelling  - hyperpigmentation, hyperplasia, + pitting, decreased scar mobility   No significant arm swelling noted   + impingement left shoulder w/ prolonged overhead positioning     Trunk Measurement:  - trunk circumference (Level of L nipple); 110 cm    ( prior to radiation: 111.1 cm )    Stemmer's Sign: negative   Arm Volume Measurements:       12/9/2024     6:00 AM 2/22/2024     8:00 AM 12/5/2023     6:00 PM 10/25/2023     3:00 PM   Lymphedema Calculations   DATE MEASURED 12/9/2024 2/22/2024 12/5/2023 10/25/2023   LOCATION/MEASUREMENTS [facility]UE [facility]URENET[facility] L[facility]ENETTA   PATIENT POSITION  supine 1  pillow supine 1 pillow supine 1 pillow supine 1 pillow   LIMB POSITION 75 deg abduction 75 deg abduction 75 deg abduction 75 deg abduction   STARTING POINT 17cm from 3rd cuticle 17cm from 3rd cuticle 17cm from 3rd cuticle 17cm from 3rd cuticle   RIGHT HAND VOLUME 21.7cm across palm 21.7cm across palm 21.7cm across palm 21.7cm across palm   LEFT HAND VOLUME 21.7cm across palm 21.7cm across palm 21.7cm across palm 21.7cm across palm   MEASUREMENT A 16.3 16.5 16.5 16.5   MEASUREMENT B 19.2 19.7 20.1 20.2   MEASUREMENT C 23.3 23.8 24.5 24.7   MEASUREMENT D 26.3 26.3 27.8 27.8   MEASUREMENT E 27.8 28.1 28.5 28.1   MEASUREMENT F 29.4 30.1 31 31   MEASUREMENT G 30.8 31.9 32.8 32.8   MEASUREMENT H 32.3 34.3 35.8 36   MEASUREMENT I 37.3 38.3 41.3 41.2    TOTAL VOLUME  2407.33047 2544.40340 2738.04790 2738.90185   DATE MEASURED 12/9/2024 2/22/2024 12/5/2023 10/25/2023   LOCATION/MEASUREMENTS RUE RUE RUE RUE   MEASUREMENT A 16.5 17.1 17.1 17   MEASUREMENT B 18.8 20.4 20.4 20.6   MEASUREMENT C 23.3 24.5 24.7 24.8   MEASUREMENT D 26 27.3 27.5 27.7   MEASUREMENT E 27.3 28 28.2 28.2   MEASUREMENT F 27.8 29.1 30.3 30.5   MEASUREMENT G 30.3 30.4 32 32.6   MEASUREMENT H 34 34.6 35.6 35.8   MEASUREMENT I 38.6 38.4 40.2 39.8    TOTAL VOLUME  2400.05829 2540.84649 2677.56233 2705.77813   % DIFFERENCE 0.09113 0.44830 2.31844 1.2092          Lymphedema Life Impact Scale: Evaluation Score 12/9/2024: NA pt w/ breast swelling        Today’s Treatment and Response:   Date: 12/20/2024 Date: 12/23/2024 Date: 12/26/2024 Date: 1/2/2025   Visit: #4/10  Certification From: 12/9/2024  To:3/9/2025 Visit: #5/10  Certification From: 12/9/2024  To:3/9/2025 Visit: #6/10  Certification From: 12/9/2024  To:3/9/2025 Visit: #7/10  Certification from 12/9/2024 to 3/9/2025   Manual Therapy (40 min)    Scar massage      MLD: neck sequence, abd sequence, L inguinal, A-I, R axilla, A-A, L axilla, L breast. Deep technique on breast.     Compression:  - compression  bra w/ foam compression pad    Handout on self MLD given   Manual Therapy (35 min)    Trunk measurements     Scar massage      MLD: neck sequence, abd sequence, L inguinal, A-I, R axilla, A-A, L axilla, L breast. Deep technique on breast.     REviewed self MLD and scar massage     Compression:  - compression bra w/ foam compression pad Manual therapy (45 min)    Assessed scap strength      Scar massage      MLD: neck sequence, abd sequence, L inguinal, A-I, R axilla, A-A, L axilla, L breast. Deep technique on breast.     Compression:  - pt wearing compression bra, compression sleeve/gauntlet. Instr importance of cont to wear compression sleeve prophylactically for 1 year  \"Prophylactic use of compression sleeves compared with the control group reduced and delayed the occurrence of arm swelling in women at high risk for lymphedema in the first year after surgery for breast cancer.\"  Mat et al. Prophylactic Use of Compression Sleeves Reduces the Incidence of Arm Swelling in Women at High Risk of Breast Cancer-Related Lymphedema: A Randomized Controlled Trial. Journal of Clinical Oncology. Accepted on January 7, 2022 and published at [email or web address] on February 2, 2022: DOI [email or web address]   Manual therapy (40 minutes)      MLD: neck sequence, abd sequence, L inguinal, A-I, R axilla, A-A, L axilla, L breast. Deep technique on breast.     Compression:  -pt wearing compression bra and sleeve.        There Ex (10 min)  - sidely windmills x 10  - sidely horiz abd/add x 10  - doorway pect stretch There Ex  - encouraged pt to cont w/ exercises There Ex (5 minutes)  -side lying horiz abd/add x 10  -side lying abd x 10               ASSESSMENT: Focus on MLD/STM of left breast.    Goals (discussed and planned with patient involvement):      To be met in 10 visits:  1) Decrease swelling L breast by a total of 3 cm to decrease risks of infection   2) Pt to be independent with self MLD, ROM  exercises, and donning/doffing compression bandages/garments to facilitate swelling reduction and to be independent at self management once discharged  3) Increase B scap strength to at least 4/5 to improve ease of lifting and performing household chores- MET        PLAN:       Frequency / Duration: Patient will be seen for 1-2 x/week or a total of 10 visits  .   over a 90 day period. Frequency will decrease as symptoms improve.     Treatment will include: Complete Decongestive Therapy: Manual Therapy, Self-Care/Home Management Training, Therapeutic Exercise, Neuromuscular Re-education, Orthotic Management and Training        Charges: mm3        Total Timed Treatment: 45 min     Total Treatment Time: 45 min

## 2025-01-03 ENCOUNTER — OFFICE VISIT (OUTPATIENT)
Dept: PHYSICAL THERAPY | Facility: HOSPITAL | Age: 50
End: 2025-01-03
Attending: SURGERY

## 2025-01-03 PROCEDURE — 97110 THERAPEUTIC EXERCISES: CPT

## 2025-01-03 PROCEDURE — 97140 MANUAL THERAPY 1/> REGIONS: CPT

## 2025-01-03 NOTE — PROGRESS NOTES
Referring Provider:  Clare  Diagnosis: Lymphedema of breast (I89.0)     Date of onset: August 1, 2024 Evaluation Date: 12/9/2024         LYMPHEDEMA TREATMENT:             Past medical history was reviewed with Alisia.   Past Medical History:    Breast CA (HCC)    Fall 2023    Cancer (HCC)    High blood pressure    Hypertension    Trigeminal neuralgia of left side of face         Precautions:  HTN, L breast cancer  Education or treatment limitation: unemployed/no-insurance, relies on financial assistance  Rehab Potential:good      PAIN: no pain    SUBJECTIVE: Pt states that she is doing self massage and exercises daily.    OBJECTIVE:  12/26/2024:  Swelling L breast  Scap strength:   Rhomb: 4/5 B   Mid trap: 4/5 B     12/23/22024:  Swelling L breast (hyperplasia, hyperpigmentation, + pitting)  Trunk Measurement:  - trunk circumference (Level of L nipple); 108.5 cm (IE: 110 cm)  Rash resolved     12/18/2024:  Swelling L breast (hyperplasia, hyperpigmentation, + pitting)  Trunk Measurement:  - trunk circumference (Level of L nipple); 109.2 cm (IE: 110 cm)  Slight rash noted under left breast and in axilla (in skin folds)  Decreased scap strength      12/9/2024 (Evaluation):    AROM/Strength:  B shld AROM WFL, strength WFL, B scap strength grossly 4-/5    Edema/Tissue Observations:    L breast swelling  - hyperpigmentation, hyperplasia, + pitting, decreased scar mobility   No significant arm swelling noted   + impingement left shoulder w/ prolonged overhead positioning     Trunk Measurement:  - trunk circumference (Level of L nipple); 110 cm    ( prior to radiation: 111.1 cm )    Stemmer's Sign: negative   Arm Volume Measurements:       12/9/2024     6:00 AM 2/22/2024     8:00 AM 12/5/2023     6:00 PM 10/25/2023     3:00 PM   Lymphedema Calculations   DATE MEASURED 12/9/2024 2/22/2024 12/5/2023 10/25/2023   LOCATION/MEASUREMENTS [facility]   PATIENT POSITION  supine 1 pillow supine 1 pillow supine 1 pillow  supine 1 pillow   LIMB POSITION 75 deg abduction 75 deg abduction 75 deg abduction 75 deg abduction   STARTING POINT 17cm from 3rd cuticle 17cm from 3rd cuticle 17cm from 3rd cuticle 17cm from 3rd cuticle   RIGHT HAND VOLUME 21.7cm across palm 21.7cm across palm 21.7cm across palm 21.7cm across palm   LEFT HAND VOLUME 21.7cm across palm 21.7cm across palm 21.7cm across palm 21.7cm across palm   MEASUREMENT A 16.3 16.5 16.5 16.5   MEASUREMENT B 19.2 19.7 20.1 20.2   MEASUREMENT C 23.3 23.8 24.5 24.7   MEASUREMENT D 26.3 26.3 27.8 27.8   MEASUREMENT E 27.8 28.1 28.5 28.1   MEASUREMENT F 29.4 30.1 31 31   MEASUREMENT G 30.8 31.9 32.8 32.8   MEASUREMENT H 32.3 34.3 35.8 36   MEASUREMENT I 37.3 38.3 41.3 41.2    TOTAL VOLUME  [id number] 2544.48064 2738.63855 2738.96360   DATE MEASURED 12/9/2024 2/22/2024 12/5/2023 10/25/2023   LOCATION/MEASUREMENTS RUE RUE RUE RUE   MEASUREMENT A 16.5 17.1 17.1 17   MEASUREMENT B 18.8 20.4 20.4 20.6   MEASUREMENT C 23.3 24.5 24.7 24.8   MEASUREMENT D 26 27.3 27.5 27.7   MEASUREMENT E 27.3 28 28.2 28.2   MEASUREMENT F 27.8 29.1 30.3 30.5   MEASUREMENT G 30.3 30.4 32 32.6   MEASUREMENT H 34 34.6 35.6 35.8   MEASUREMENT I 38.6 38.4 40.2 39.8    TOTAL VOLUME  2400.24238 2540.61000 2677.66760 2705.51541   % DIFFERENCE 0.64171 0.25087 2.38830 1.4739          Lymphedema Life Impact Scale: Evaluation Score 12/9/2024: NA pt w/ breast swelling        Today’s Treatment and Response:   Date: 12/20/2024 Date: 12/23/2024 Date: 12/26/2024 Date: 1/2/2025 Date:1/3/2025   Visit: #4/10  Certification From: 12/9/2024  To:3/9/2025 Visit: #5/10  Certification From: 12/9/2024  To:3/9/2025 Visit: #6/10  Certification From: 12/9/2024  To:3/9/2025 Visit: #7/10  Certification from 12/9/2024 to 3/9/2025 Visit: #8/10  Certification from 12/9/2024 to 3/9/2025   Manual Therapy (40 min)    Scar massage      MLD: neck sequence, abd sequence, L inguinal, A-I, R axilla, A-A, L axilla, L breast. Deep technique on breast.      Compression:  - compression bra w/ foam compression pad    Handout on self MLD given   Manual Therapy (35 min)    Trunk measurements     Scar massage      MLD: neck sequence, abd sequence, L inguinal, A-I, R axilla, A-A, L axilla, L breast. Deep technique on breast.     REviewed self MLD and scar massage     Compression:  - compression bra w/ foam compression pad Manual therapy (45 min)    Assessed scap strength      Scar massage      MLD: neck sequence, abd sequence, L inguinal, A-I, R axilla, A-A, L axilla, L breast. Deep technique on breast.     Compression:  - pt wearing compression bra, compression sleeve/gauntlet. Instr importance of cont to wear compression sleeve prophylactically for 1 year  \"Prophylactic use of compression sleeves compared with the control group reduced and delayed the occurrence of arm swelling in women at high risk for lymphedema in the first year after surgery for breast cancer.\"  Mat et al. Prophylactic Use of Compression Sleeves Reduces the Incidence of Arm Swelling in Women at High Risk of Breast Cancer-Related Lymphedema: A Randomized Controlled Trial. Journal of Clinical Oncology. Accepted on January 7, 2022 and published at [email or web address] on February 2, 2022: DOI [email or web address]   Manual therapy (40 minutes)      MLD: neck sequence, abd sequence, L inguinal, A-I, R axilla, A-A, L axilla, L breast. Deep technique on breast.     Compression:  -pt wearing compression bra and sleeve. Manual therapy (35  minutes)        MLD: neck sequence, abd sequence, L inguinal, A-I, R axilla, A-A, L axilla, L breast. Deep technique on breast.   MLD provided in supine and side lying positions)    Compression:  -pt wearing compression bra and sleeve.        There Ex (10 min)  - sidely windmills x 10  - sidely horiz abd/add x 10  - doorway pect stretch There Ex  - encouraged pt to cont w/ exercises There Ex (5 minutes)  -side lying horiz abd/add x 10  -side lying  abd x 10 There Ex (10 minutes)  - sidely windmills x 10  - sidely horiz abd/add x 10  - doorway pect stretch  PROM L shoulder with lat stretch                ASSESSMENT: Breast swelling decreased following MLD and STM techniques. Pt wearing compression bra and pad.  Breast swells at night and recommended pt wear bra at night.    Goals (discussed and planned with patient involvement):      To be met in 10 visits:  1) Decrease swelling L breast by a total of 3 cm to decrease risks of infection   2) Pt to be independent with self MLD, ROM exercises, and donning/doffing compression bandages/garments to facilitate swelling reduction and to be independent at self management once discharged  3) Increase B scap strength to at least 4/5 to improve ease of lifting and performing household chores- MET        PLAN:       Frequency / Duration: Patient will be seen for 1-2 x/week or a total of 10 visits  .   over a 90 day period. Frequency will decrease as symptoms improve.     Treatment will include: Complete Decongestive Therapy: Manual Therapy, Self-Care/Home Management Training, Therapeutic Exercise, Neuromuscular Re-education, Orthotic Management and Training        Charges: MM2, Ex1        Total Timed Treatment: 45 min     Total Treatment Time: 45 min

## 2025-01-08 ENCOUNTER — OFFICE VISIT (OUTPATIENT)
Dept: PHYSICAL THERAPY | Facility: HOSPITAL | Age: 50
End: 2025-01-08
Attending: SURGERY

## 2025-01-08 PROCEDURE — 97140 MANUAL THERAPY 1/> REGIONS: CPT | Performed by: PHYSICAL THERAPIST

## 2025-01-08 NOTE — PROGRESS NOTES
Referring Provider:  Clare  Diagnosis: Lymphedema of breast (I89.0)     Date of onset: August 1, 2024 Evaluation Date: 12/9/2024     Progress Summary    Pt has attended 9 visits in Physical Therapy.       Assessment: Pt with improved strength and decreased left breast swelling. Swelling persists left breast w/ hyperpigmentation and hyperplasia. Pt would benefit from add'l treatment to further reduce swelling and progress towards self management.      Plan: Continue skilled Physical Therapy for a total of 6 more visits over a 90 day period. Treatment will include: CDT               Lymphedema Life Impact Scale: Score 1/8/2025: NA (no extremity swelling)       Rehab Potential: good          Patient/Family/Caregiver was advised of these findings, precautions, and treatment options and has agreed to actively participate in planning and for this course of care.              LYMPHEDEMA TREATMENT:             Past medical history was reviewed with Alisia.   Past Medical History:    Breast CA (HCC)    Fall 2023    Cancer (HCC)    High blood pressure    Hypertension    Trigeminal neuralgia of left side of face         Precautions:  HTN, L breast cancer  Education or treatment limitation: unemployed/no-insurance, relies on financial assistance  Rehab Potential:good      PAIN: no pain    SUBJECTIVE:  \"It's getting smaller\"    OBJECTIVE:  1/8/2025:  Swelling L breast (hyperplasia, hyperpigmentation, + pitting)  Trunk Measurement:  - trunk circumference (Level of L nipple); 108.4 cm (IE: 110 cm)        12/26/2024:  Swelling L breast  Scap strength:   Rhomb: 4/5 B   Mid trap: 4/5 B     12/23/22024:  Swelling L breast (hyperplasia, hyperpigmentation, + pitting)  Trunk Measurement:  - trunk circumference (Level of L nipple); 108.5 cm (IE: 110 cm)  Rash resolved     12/18/2024:  Swelling L breast (hyperplasia, hyperpigmentation, + pitting)  Trunk Measurement:  - trunk circumference (Level of L nipple); 109.2 cm (IE: 110  cm)  Slight rash noted under left breast and in axilla (in skin folds)  Decreased scap strength      12/9/2024 (Evaluation):    AROM/Strength:  B shld AROM WFL, strength WFL, B scap strength grossly 4-/5    Edema/Tissue Observations:    L breast swelling  - hyperpigmentation, hyperplasia, + pitting, decreased scar mobility   No significant arm swelling noted   + impingement left shoulder w/ prolonged overhead positioning     Trunk Measurement:  - trunk circumference (Level of L nipple); 110 cm    ( prior to radiation: 111.1 cm )    Stemmer's Sign: negative   Arm Volume Measurements:       12/9/2024     6:00 AM 2/22/2024     8:00 AM 12/5/2023     6:00 PM 10/25/2023     3:00 PM   Lymphedema Calculations   DATE MEASURED 12/9/2024 2/22/2024 12/5/2023 10/25/2023   LOCATION/MEASUREMENTS LUE LUE LUE LUE   PATIENT POSITION  supine 1 pillow supine 1 pillow supine 1 pillow supine 1 pillow   LIMB POSITION 75 deg abduction 75 deg abduction 75 deg abduction 75 deg abduction   STARTING POINT 17cm from 3rd cuticle 17cm from 3rd cuticle 17cm from 3rd cuticle 17cm from 3rd cuticle   RIGHT HAND VOLUME 21.7cm across palm 21.7cm across palm 21.7cm across palm 21.7cm across palm   LEFT HAND VOLUME 21.7cm across palm 21.7cm across palm 21.7cm across palm 21.7cm across palm   MEASUREMENT A 16.3 16.5 16.5 16.5   MEASUREMENT B 19.2 19.7 20.1 20.2   MEASUREMENT C 23.3 23.8 24.5 24.7   MEASUREMENT D 26.3 26.3 27.8 27.8   MEASUREMENT E 27.8 28.1 28.5 28.1   MEASUREMENT F 29.4 30.1 31 31   MEASUREMENT G 30.8 31.9 32.8 32.8   MEASUREMENT H 32.3 34.3 35.8 36   MEASUREMENT I 37.3 38.3 41.3 41.2    TOTAL VOLUME  [id number] 2044.67023 9718.72968 2738.93678   DATE MEASURED 12/9/2024 2/22/2024 12/5/2023 10/25/2023   LOCATION/MEASUREMENTS RUE RUE RUE RUE   MEASUREMENT A 16.5 17.1 17.1 17   MEASUREMENT B 18.8 20.4 20.4 20.6   MEASUREMENT C 23.3 24.5 24.7 24.8   MEASUREMENT D 26 27.3 27.5 27.7   MEASUREMENT E 27.3 28 28.2 28.2   MEASUREMENT F 27.8 29.1  30.3 30.5   MEASUREMENT G 30.3 30.4 32 32.6   MEASUREMENT H 34 34.6 35.6 35.8   MEASUREMENT I 38.6 38.4 40.2 39.8    TOTAL VOLUME  2400.83552 2540.71146 2677.74650 2705.52487   % DIFFERENCE 0.05040 0.16942 2.88241 1.2093          Lymphedema Life Impact Scale: Evaluation Score 12/9/2024: NA pt w/ breast swelling        Today’s Treatment and Response:   Date: 1/2/2025 Date:1/3/2025 1/8/2025   Visit: #7/10  Certification from 12/9/2024 to 3/9/2025 Visit: #8/10  Certification from 12/9/2024 to 3/9/2025 Visit: #9/15  Certification from 12/9/2024 to 3/9/2025   Manual therapy (40 minutes)      MLD: neck sequence, abd sequence, L inguinal, A-I, R axilla, A-A, L axilla, L breast. Deep technique on breast.     Compression:  -pt wearing compression bra and sleeve. Manual therapy (35  minutes)        MLD: neck sequence, abd sequence, L inguinal, A-I, R axilla, A-A, L axilla, L breast. Deep technique on breast.   MLD provided in supine and side lying positions)    Compression:  -pt wearing compression bra and sleeve. Manual Therapy (45 min)    Measurements    MLD: neck sequence, abd sequence, L inguinal, A-I, R axilla, A-A, L axilla, L breast. Deep technique on breast.   MLD provided in supine and side lying positions)    Compression:  -pt wearing compression bra w/ compression pad and compression  sleeve.  - encouraged pt to purchase breast swell spot for greater compression of left breast    There Ex (5 minutes)  -side lying horiz abd/add x 10  -side lying abd x 10 There Ex (10 minutes)  - sidely windmills x 10  - sidely horiz abd/add x 10  - doorway pect stretch  PROM L shoulder with lat stretch There Ex  - pt to cont HEP                  ASSESSMENT: Swelling decreasing but still has significant swelling and skin changes.       Goals (discussed and planned with patient involvement):      To be met in 10 visits:  1) Decrease swelling L breast by a total of 3 cm to decrease risks of infection   2) Pt to be independent with self  MLD, ROM exercises, and donning/doffing compression bandages/garments to facilitate swelling reduction and to be independent at self management once discharged  3) Increase B scap strength to at least 4/5 to improve ease of lifting and performing household chores- MET        PLAN:       Frequency / Duration: Patient will be seen for 1-2 x/week or a total of 10 visits over a 90 day period. Frequency will decrease as symptoms improve.     Treatment will include: Complete Decongestive Therapy: Manual Therapy, Self-Care/Home Management Training, Therapeutic Exercise, Neuromuscular Re-education, Orthotic Management and Training        Charges: MM3         Total Timed Treatment: 45 min     Total Treatment Time: 45 min

## 2025-01-10 ENCOUNTER — OFFICE VISIT (OUTPATIENT)
Dept: PHYSICAL THERAPY | Facility: HOSPITAL | Age: 50
End: 2025-01-10
Attending: SURGERY

## 2025-01-10 PROCEDURE — 97140 MANUAL THERAPY 1/> REGIONS: CPT | Performed by: PHYSICAL THERAPIST

## 2025-01-10 NOTE — PROGRESS NOTES
Referring Provider:  Clare  Diagnosis: Lymphedema of breast (I89.0)     Date of onset: August 1, 2024 Evaluation Date: 12/9/2024           LYMPHEDEMA TREATMENT:             Past medical history was reviewed with Alisia.   Past Medical History:    Breast CA (HCC)    Fall 2023    Cancer (HCC)    High blood pressure    Hypertension    Trigeminal neuralgia of left side of face         Precautions:  HTN, L breast cancer  Education or treatment limitation: unemployed/no-insurance, relies on financial assistance  Rehab Potential:good      PAIN: no pain    SUBJECTIVE:  \"It's getting smaller\"    OBJECTIVE:  1/8/2025:  Swelling L breast (hyperplasia, hyperpigmentation, + pitting)  Trunk Measurement:  - trunk circumference (Level of L nipple); 108.4 cm (IE: 110 cm)        12/26/2024:  Swelling L breast  Scap strength:   Rhomb: 4/5 B   Mid trap: 4/5 B     12/23/22024:  Swelling L breast (hyperplasia, hyperpigmentation, + pitting)  Trunk Measurement:  - trunk circumference (Level of L nipple); 108.5 cm (IE: 110 cm)  Rash resolved     12/18/2024:  Swelling L breast (hyperplasia, hyperpigmentation, + pitting)  Trunk Measurement:  - trunk circumference (Level of L nipple); 109.2 cm (IE: 110 cm)  Slight rash noted under left breast and in axilla (in skin folds)  Decreased scap strength      12/9/2024 (Evaluation):    AROM/Strength:  B shld AROM WFL, strength WFL, B scap strength grossly 4-/5    Edema/Tissue Observations:    L breast swelling  - hyperpigmentation, hyperplasia, + pitting, decreased scar mobility   No significant arm swelling noted   + impingement left shoulder w/ prolonged overhead positioning     Trunk Measurement:  - trunk circumference (Level of L nipple); 110 cm    ( prior to radiation: 111.1 cm )    Stemmer's Sign: negative   Arm Volume Measurements:       12/9/2024     6:00 AM 2/22/2024     8:00 AM 12/5/2023     6:00 PM 10/25/2023     3:00 PM   Lymphedema Calculations   DATE MEASURED 12/9/2024 2/22/2024  12/5/2023 10/25/2023   LOCATION/MEASUREMENTS [facility]   PATIENT POSITION  supine 1 pillow supine 1 pillow supine 1 pillow supine 1 pillow   LIMB POSITION 75 deg abduction 75 deg abduction 75 deg abduction 75 deg abduction   STARTING POINT 17cm from 3rd cuticle 17cm from 3rd cuticle 17cm from 3rd cuticle 17cm from 3rd cuticle   RIGHT HAND VOLUME 21.7cm across palm 21.7cm across palm 21.7cm across palm 21.7cm across palm   LEFT HAND VOLUME 21.7cm across palm 21.7cm across palm 21.7cm across palm 21.7cm across palm   MEASUREMENT A 16.3 16.5 16.5 16.5   MEASUREMENT B 19.2 19.7 20.1 20.2   MEASUREMENT C 23.3 23.8 24.5 24.7   MEASUREMENT D 26.3 26.3 27.8 27.8   MEASUREMENT E 27.8 28.1 28.5 28.1   MEASUREMENT F 29.4 30.1 31 31   MEASUREMENT G 30.8 31.9 32.8 32.8   MEASUREMENT H 32.3 34.3 35.8 36   MEASUREMENT I 37.3 38.3 41.3 41.2    TOTAL VOLUME  2407.20967 2544.48669 2738.05740 2738.76747   DATE MEASURED 12/9/2024 2/22/2024 12/5/2023 10/25/2023   LOCATION/MEASUREMENTS [facility]   MEASUREMENT A 16.5 17.1 17.1 17   MEASUREMENT B 18.8 20.4 20.4 20.6   MEASUREMENT C 23.3 24.5 24.7 24.8   MEASUREMENT D 26 27.3 27.5 27.7   MEASUREMENT E 27.3 28 28.2 28.2   MEASUREMENT F 27.8 29.1 30.3 30.5   MEASUREMENT G 30.3 30.4 32 32.6   MEASUREMENT H 34 34.6 35.6 35.8   MEASUREMENT I 38.6 38.4 40.2 39.8    TOTAL VOLUME  2400.79344 2540.11432 2677.64898 2705.08176   % DIFFERENCE 0.34855 0.48868 2.54860 1.2092          Lymphedema Life Impact Scale: Evaluation Score 12/9/2024: NA pt w/ breast swelling        Today’s Treatment and Response:   Date: 1/2/2025 Date:1/3/2025 1/8/2025 1/10/2025   Visit: #7/10  Certification from 12/9/2024 to 3/9/2025 Visit: #8/10  Certification from 12/9/2024 to 3/9/2025 Visit: #9/15  Certification from 12/9/2024 to 3/9/2025 Visit: #10/15  Certification from 12/9/2024 to 3/9/2025   Manual therapy (40 minutes)      MLD: neck sequence, abd sequence, L inguinal, A-I, R axilla, A-A, L axilla, L breast.  Deep technique on breast.     Compression:  -pt wearing compression bra and sleeve. Manual therapy (35  minutes)        MLD: neck sequence, abd sequence, L inguinal, A-I, R axilla, A-A, L axilla, L breast. Deep technique on breast.   MLD provided in supine and side lying positions)    Compression:  -pt wearing compression bra and sleeve. Manual Therapy (45 min)    Measurements    MLD: neck sequence, abd sequence, L inguinal, A-I, R axilla, A-A, L axilla, L breast. Deep technique on breast.   MLD provided in supine and side lying positions)    Compression:  -pt wearing compression bra w/ compression pad and compression  sleeve.  - encouraged pt to purchase breast swell spot for greater compression of left breast  Manual therapy (30 min)    MLD: neck sequence, abd sequence, L inguinal, A-I, R axilla, A-A, L axilla, L breast. Deep technique on breast.    There Ex (5 minutes)  -side lying horiz abd/add x 10  -side lying abd x 10 There Ex (10 minutes)  - sidely windmills x 10  - sidely horiz abd/add x 10  - doorway pect stretch  PROM L shoulder with lat stretch There Ex  - pt to cont HEP                    ASSESSMENT: Limited treatment today due to pt late (bad weather)        Goals (discussed and planned with patient involvement):      To be met in 10 visits:  1) Decrease swelling L breast by a total of 3 cm to decrease risks of infection   2) Pt to be independent with self MLD, ROM exercises, and donning/doffing compression bandages/garments to facilitate swelling reduction and to be independent at self management once discharged  3) Increase B scap strength to at least 4/5 to improve ease of lifting and performing household chores- MET        PLAN:       Frequency / Duration: Patient will be seen for 1-2 x/week or a total of 10 visits over a 90 day period. Frequency will decrease as symptoms improve.     Treatment will include: Complete Decongestive Therapy: Manual Therapy, Self-Care/Home Management Training,  Therapeutic Exercise, Neuromuscular Re-education, Orthotic Management and Training        Charges: MM2         Total Timed Treatment: 30 min     Total Treatment Time: 30 min

## 2025-01-15 ENCOUNTER — HOSPITAL ENCOUNTER (OUTPATIENT)
Dept: MRI IMAGING | Facility: HOSPITAL | Age: 50
Discharge: HOME | End: 2025-01-15
Attending: SURGERY

## 2025-01-15 DIAGNOSIS — Z17.0 MALIGNANT NEOPLASM OF CENTRAL PORTION OF LEFT BREAST IN FEMALE, ESTROGEN RECEPTOR POSITIVE (HCC): ICD-10-CM

## 2025-01-15 DIAGNOSIS — C50.112 MALIGNANT NEOPLASM OF CENTRAL PORTION OF LEFT BREAST IN FEMALE, ESTROGEN RECEPTOR POSITIVE (HCC): ICD-10-CM

## 2025-01-15 PROCEDURE — 77049 MRI BREAST C-+ W/CAD BI: CPT | Performed by: SURGERY

## 2025-01-15 PROCEDURE — A9575 INJ GADOTERATE MEGLUMI 0.1ML: HCPCS | Performed by: SURGERY

## 2025-01-15 RX ORDER — GADOTERATE MEGLUMINE 376.9 MG/ML
20 INJECTION INTRAVENOUS
Status: COMPLETED | OUTPATIENT
Start: 2025-01-15 | End: 2025-01-15

## 2025-01-15 RX ADMIN — GADOTERATE MEGLUMINE 18 ML: 376.9 INJECTION INTRAVENOUS at 15:40:00

## 2025-01-16 ENCOUNTER — OFFICE VISIT (OUTPATIENT)
Dept: PHYSICAL THERAPY | Facility: HOSPITAL | Age: 50
End: 2025-01-16
Attending: SURGERY

## 2025-01-16 PROCEDURE — 97140 MANUAL THERAPY 1/> REGIONS: CPT

## 2025-01-16 PROCEDURE — 97110 THERAPEUTIC EXERCISES: CPT

## 2025-01-16 NOTE — PROGRESS NOTES
Referring Provider:  Clare  Diagnosis: Lymphedema of breast (I89.0)     Date of onset: August 1, 2024 Evaluation Date: 12/9/2024           LYMPHEDEMA TREATMENT:             Past medical history was reviewed with Alisia.   Past Medical History:    Breast CA (HCC)    Fall 2023    Cancer (HCC)    High blood pressure    Hypertension    Trigeminal neuralgia of left side of face         Precautions:  HTN, L breast cancer  Education or treatment limitation: unemployed/no-insurance, relies on financial assistance  Rehab Potential:good      PAIN: no pain    SUBJECTIVE: Pt states that the foam is helping her.  \"I wear the swell spot at night, it's good.\"    OBJECTIVE:  1/8/2025:  Swelling L breast (hyperplasia, hyperpigmentation, + pitting)  Trunk Measurement:  - trunk circumference (Level of L nipple); 108.4 cm (IE: 110 cm)        12/26/2024:  Swelling L breast  Scap strength:   Rhomb: 4/5 B   Mid trap: 4/5 B     12/23/22024:  Swelling L breast (hyperplasia, hyperpigmentation, + pitting)  Trunk Measurement:  - trunk circumference (Level of L nipple); 108.5 cm (IE: 110 cm)  Rash resolved     12/18/2024:  Swelling L breast (hyperplasia, hyperpigmentation, + pitting)  Trunk Measurement:  - trunk circumference (Level of L nipple); 109.2 cm (IE: 110 cm)  Slight rash noted under left breast and in axilla (in skin folds)  Decreased scap strength      12/9/2024 (Evaluation):    AROM/Strength:  B shld AROM WFL, strength WFL, B scap strength grossly 4-/5    Edema/Tissue Observations:    L breast swelling  - hyperpigmentation, hyperplasia, + pitting, decreased scar mobility   No significant arm swelling noted   + impingement left shoulder w/ prolonged overhead positioning     Trunk Measurement:  - trunk circumference (Level of L nipple); 110 cm    ( prior to radiation: 111.1 cm )    Stemmer's Sign: negative   Arm Volume Measurements:       12/9/2024     6:00 AM 2/22/2024     8:00 AM 12/5/2023     6:00 PM 10/25/2023     3:00 PM    Lymphedema Calculations   DATE MEASURED 12/9/2024 2/22/2024 12/5/2023 10/25/2023   LOCATION/MEASUREMENTS LUE LUE LUE LUE   PATIENT POSITION  supine 1 pillow supine 1 pillow supine 1 pillow supine 1 pillow   LIMB POSITION 75 deg abduction 75 deg abduction 75 deg abduction 75 deg abduction   STARTING POINT 17cm from 3rd cuticle 17cm from 3rd cuticle 17cm from 3rd cuticle 17cm from 3rd cuticle   RIGHT HAND VOLUME 21.7cm across palm 21.7cm across palm 21.7cm across palm 21.7cm across palm   LEFT HAND VOLUME 21.7cm across palm 21.7cm across palm 21.7cm across palm 21.7cm across palm   MEASUREMENT A 16.3 16.5 16.5 16.5   MEASUREMENT B 19.2 19.7 20.1 20.2   MEASUREMENT C 23.3 23.8 24.5 24.7   MEASUREMENT D 26.3 26.3 27.8 27.8   MEASUREMENT E 27.8 28.1 28.5 28.1   MEASUREMENT F 29.4 30.1 31 31   MEASUREMENT G 30.8 31.9 32.8 32.8   MEASUREMENT H 32.3 34.3 35.8 36   MEASUREMENT I 37.3 38.3 41.3 41.2    TOTAL VOLUME  2407.08378 2544.13000 2738.76868 2738.23993   DATE MEASURED 12/9/2024 2/22/2024 12/5/2023 10/25/2023   LOCATION/MEASUREMENTS RUE RUE RUE RUE   MEASUREMENT A 16.5 17.1 17.1 17   MEASUREMENT B 18.8 20.4 20.4 20.6   MEASUREMENT C 23.3 24.5 24.7 24.8   MEASUREMENT D 26 27.3 27.5 27.7   MEASUREMENT E 27.3 28 28.2 28.2   MEASUREMENT F 27.8 29.1 30.3 30.5   MEASUREMENT G 30.3 30.4 32 32.6   MEASUREMENT H 34 34.6 35.6 35.8   MEASUREMENT I 38.6 38.4 40.2 39.8    TOTAL VOLUME  2400.11351 2540.69650 2677.91703 2705.26527   % DIFFERENCE 0.75498 0.78023 2.31230 1.6480          Lymphedema Life Impact Scale: Evaluation Score 12/9/2024: NA pt w/ breast swelling        Today’s Treatment and Response:   Date: 1/2/2025 Date:1/3/2025 1/8/2025 1/10/2025 Date: 1/16/2025   Visit: #7/10  Certification from 12/9/2024 to 3/9/2025 Visit: #8/10  Certification from 12/9/2024 to 3/9/2025 Visit: #9/15  Certification from 12/9/2024 to 3/9/2025 Visit: #10/15  Certification from 12/9/2024 to 3/9/2025 Visit: #11/15  Certification from 12/9/2024  to 3/9/2025   Manual therapy (40 minutes)      MLD: neck sequence, abd sequence, L inguinal, A-I, R axilla, A-A, L axilla, L breast. Deep technique on breast.     Compression:  -pt wearing compression bra and sleeve. Manual therapy (35  minutes)        MLD: neck sequence, abd sequence, L inguinal, A-I, R axilla, A-A, L axilla, L breast. Deep technique on breast.   MLD provided in supine and side lying positions)    Compression:  -pt wearing compression bra and sleeve. Manual Therapy (45 min)    Measurements    MLD: neck sequence, abd sequence, L inguinal, A-I, R axilla, A-A, L axilla, L breast. Deep technique on breast.   MLD provided in supine and side lying positions)    Compression:  -pt wearing compression bra w/ compression pad and compression  sleeve.  - encouraged pt to purchase breast swell spot for greater compression of left breast  Manual therapy (30 min)    MLD: neck sequence, abd sequence, L inguinal, A-I, R axilla, A-A, L axilla, L breast. Deep technique on breast.  Manual therapy (35 minutes)      MLD: neck sequence, abd sequence, L inguinal, A-I, R axilla, A-A, L axilla, L breast. Deep technique on breast.     Compression:  -pt wearing foam pad during the day in L breast bra cup and now has swell spot for around the house and when sleeping.   There Ex (5 minutes)  -side lying horiz abd/add x 10  -side lying abd x 10 There Ex (10 minutes)  - sidely windmills x 10  - sidely horiz abd/add x 10  - doorway pect stretch  PROM L shoulder with lat stretch There Ex  - pt to cont HEP      There Ex (10 minutes)  -sidelying windmills x 10 w/1#  -sidelying horiz abd/add x 10 w/1#  -side lying abduction x10 w/1#  PROM L shoulder  -standing OH flexion, abduction 2# x 10  -standing Hoirz abduction 2# x 10  *issued for home                ASSESSMENT: Pt has decreased pain and swelling noted to L breast.  Will continue to wear compression.    Goals (discussed and planned with patient involvement):      To be met in 10  visits:  1) Decrease swelling L breast by a total of 3 cm to decrease risks of infection   2) Pt to be independent with self MLD, ROM exercises, and donning/doffing compression bandages/garments to facilitate swelling reduction and to be independent at self management once discharged  3) Increase B scap strength to at least 4/5 to improve ease of lifting and performing household chores- MET        PLAN:       Frequency / Duration: Patient will be seen for 1-2 x/week or a total of 10 visits over a 90 day period. Frequency will decrease as symptoms improve.     Treatment will include: Complete Decongestive Therapy: Manual Therapy, Self-Care/Home Management Training, Therapeutic Exercise, Neuromuscular Re-education, Orthotic Management and Training        Charges: MM2, There Ex1         Total Timed Treatment: 45 min     Total Treatment Time: 45 min

## 2025-01-22 ENCOUNTER — OFFICE VISIT (OUTPATIENT)
Dept: PHYSICAL THERAPY | Facility: HOSPITAL | Age: 50
End: 2025-01-22
Attending: SURGERY

## 2025-01-22 PROCEDURE — 97140 MANUAL THERAPY 1/> REGIONS: CPT | Performed by: PHYSICAL THERAPIST

## 2025-01-22 NOTE — PROGRESS NOTES
Referring Provider:  Clare  Diagnosis: Lymphedema of breast (I89.0)     Date of onset: August 1, 2024 Evaluation Date: 12/9/2024           LYMPHEDEMA TREATMENT:             Past medical history was reviewed with Alisia.   Past Medical History:    Breast CA (HCC)    Fall 2023    Cancer (HCC)    High blood pressure    Hypertension    Trigeminal neuralgia of left side of face         Precautions:  HTN, L breast cancer  Education or treatment limitation: unemployed/no-insurance, relies on financial assistance  Rehab Potential:good      PAIN: no pain    SUBJECTIVE: \"I think it's better\"    OBJECTIVE:  1/22/2025:  Swelling left breast w/ hyperpigmentation, slight pitting medially, hyperplasia decreasing   Trunk Measurement:  - trunk circumference (Level of L nipple); 108.1 cm (IE: 110 cm)      1/8/2025:  Swelling L breast (hyperplasia, hyperpigmentation, + pitting)  Trunk Measurement:  - trunk circumference (Level of L nipple); 108.4 cm (IE: 110 cm)        12/26/2024:  Swelling L breast  Scap strength:   Rhomb: 4/5 B   Mid trap: 4/5 B     12/23/22024:  Swelling L breast (hyperplasia, hyperpigmentation, + pitting)  Trunk Measurement:  - trunk circumference (Level of L nipple); 108.5 cm (IE: 110 cm)  Rash resolved     12/18/2024:  Swelling L breast (hyperplasia, hyperpigmentation, + pitting)  Trunk Measurement:  - trunk circumference (Level of L nipple); 109.2 cm (IE: 110 cm)  Slight rash noted under left breast and in axilla (in skin folds)  Decreased scap strength      12/9/2024 (Evaluation):    AROM/Strength:  B shld AROM WFL, strength WFL, B scap strength grossly 4-/5    Edema/Tissue Observations:    L breast swelling  - hyperpigmentation, hyperplasia, + pitting, decreased scar mobility   No significant arm swelling noted   + impingement left shoulder w/ prolonged overhead positioning     Trunk Measurement:  - trunk circumference (Level of L nipple); 110 cm    ( prior to radiation: 111.1 cm )    Stemmer's Sign:  negative   Arm Volume Measurements:       12/9/2024     6:00 AM 2/22/2024     8:00 AM 12/5/2023     6:00 PM 10/25/2023     3:00 PM   Lymphedema Calculations   DATE MEASURED 12/9/2024 2/22/2024 12/5/2023 10/25/2023   LOCATION/MEASUREMENTS LUE LUE LUE LUE   PATIENT POSITION  supine 1 pillow supine 1 pillow supine 1 pillow supine 1 pillow   LIMB POSITION 75 deg abduction 75 deg abduction 75 deg abduction 75 deg abduction   STARTING POINT 17cm from 3rd cuticle 17cm from 3rd cuticle 17cm from 3rd cuticle 17cm from 3rd cuticle   RIGHT HAND VOLUME 21.7cm across palm 21.7cm across palm 21.7cm across palm 21.7cm across palm   LEFT HAND VOLUME 21.7cm across palm 21.7cm across palm 21.7cm across palm 21.7cm across palm   MEASUREMENT A 16.3 16.5 16.5 16.5   MEASUREMENT B 19.2 19.7 20.1 20.2   MEASUREMENT C 23.3 23.8 24.5 24.7   MEASUREMENT D 26.3 26.3 27.8 27.8   MEASUREMENT E 27.8 28.1 28.5 28.1   MEASUREMENT F 29.4 30.1 31 31   MEASUREMENT G 30.8 31.9 32.8 32.8   MEASUREMENT H 32.3 34.3 35.8 36   MEASUREMENT I 37.3 38.3 41.3 41.2    TOTAL VOLUME  2407.66803 2544.91877 2738.00193 2738.43664   DATE MEASURED 12/9/2024 2/22/2024 12/5/2023 10/25/2023   LOCATION/MEASUREMENTS RUE RUE RUE RUE   MEASUREMENT A 16.5 17.1 17.1 17   MEASUREMENT B 18.8 20.4 20.4 20.6   MEASUREMENT C 23.3 24.5 24.7 24.8   MEASUREMENT D 26 27.3 27.5 27.7   MEASUREMENT E 27.3 28 28.2 28.2   MEASUREMENT F 27.8 29.1 30.3 30.5   MEASUREMENT G 30.3 30.4 32 32.6   MEASUREMENT H 34 34.6 35.6 35.8   MEASUREMENT I 38.6 38.4 40.2 39.8    TOTAL VOLUME  2400.77882 2540.72735 2677.09226 2705.09060   % DIFFERENCE 0.24098 0.67689 2.10515 1.2099          Lymphedema Life Impact Scale: Evaluation Score 12/9/2024: NA pt w/ breast swelling        Today’s Treatment and Response:   1/10/2025 Date: 1/16/2025 1/22/2025   Visit: #10/15  Certification from 12/9/2024 to 3/9/2025 Visit: #11/15  Certification from 12/9/2024 to 3/9/2025 Visit: #12/15  Certification from 12/9/2024 to  3/9/2025   Manual therapy (30 min)    MLD: neck sequence, abd sequence, L inguinal, A-I, R axilla, A-A, L axilla, L breast. Deep technique on breast.  Manual therapy (35 minutes)      MLD: neck sequence, abd sequence, L inguinal, A-I, R axilla, A-A, L axilla, L breast. Deep technique on breast.     Compression:  -pt wearing foam pad during the day in L breast bra cup and now has swell spot for around the house and when sleeping. Manual Therapy (40 min)    Measurements     MLD: neck sequence, abd sequence, L inguinal, A-I, R axilla, A-A, L axilla, L breast. Deep technique and increased time on breast.     Compression:  -pt wearing foam pad during the day in L breast bra cup, swell spot at night    There Ex (10 minutes)  -sidelying windmills x 10 w/1#  -sidelying horiz abd/add x 10 w/1#  -side lying abduction x10 w/1#  PROM L shoulder  -standing OH flexion, abduction 2# x 10  -standing Hoirz abduction 2# x 10  *issued for home               ASSESSMENT: Swelling continues to decrease, tissue improving    Goals (discussed and planned with patient involvement):      To be met in 10 visits:  1) Decrease swelling L breast by a total of 3 cm to decrease risks of infection   2) Pt to be independent with self MLD, ROM exercises, and donning/doffing compression bandages/garments to facilitate swelling reduction and to be independent at self management once discharged  3) Increase B scap strength to at least 4/5 to improve ease of lifting and performing household chores- MET        PLAN:       Frequency / Duration: Patient will be seen for 1-2 x/week or a total of 10 visits over a 90 day period. Frequency will decrease as symptoms improve.     Treatment will include: Complete Decongestive Therapy: Manual Therapy, Self-Care/Home Management Training, Therapeutic Exercise, Neuromuscular Re-education, Orthotic Management and Training        Charges: mm3         Total Timed Treatment: 40min     Total Treatment Time: 45 min

## 2025-01-28 ENCOUNTER — OFFICE VISIT (OUTPATIENT)
Dept: PHYSICAL THERAPY | Facility: HOSPITAL | Age: 50
End: 2025-01-28
Attending: SURGERY

## 2025-01-28 PROCEDURE — 97140 MANUAL THERAPY 1/> REGIONS: CPT | Performed by: PHYSICAL THERAPIST

## 2025-01-28 NOTE — PROGRESS NOTES
Referring Provider:  Clare  Diagnosis: Lymphedema of breast (I89.0)     Date of onset: August 1, 2024 Evaluation Date: 12/9/2024       Discharge Summary    Pt has attended 13 visits in Physical Therapy.       Assessment: Pt overall has had good reduction of swelling L breast, improved strength, Although still w/ swelling left breast, pt is independent w/ self management (compression and self MLD).      Plan: Discharge from PT         Lymphedema Life Impact Scale: NA (LLIS for arm/leg lymph, pt w/ breast lymph)        LYMPHEDEMA TREATMENT:             Past medical history was reviewed with Alisia.   Past Medical History:    Breast CA (HCC)    Fall 2023    Cancer (HCC)    High blood pressure    Hypertension    Trigeminal neuralgia of left side of face         Precautions:  HTN, L breast cancer  Education or treatment limitation: unemployed/no-insurance, relies on financial assistance  Rehab Potential:good      PAIN: no pain    SUBJECTIVE: \"I'm good\"    OBJECTIVE:    1/28/2025:  Swelling left breast, but continues to improve  Trunk Measurement:  - trunk circumference (Level of L nipple); 107.8 cm (IE: 110 cm)  No swelling of LUE       1/28/2025     6:00 AM 12/9/2024     6:00 AM 2/22/2024     8:00 AM 12/5/2023     6:00 PM 10/25/2023     3:00 PM   Lymphedema Calculations   DATE MEASURED 1/28/2025 12/9/2024 2/22/2024 12/5/2023 10/25/2023   LOCATION/MEASUREMENTS [facility]   PATIENT POSITION  supine 1 pillow supine 1 pillow supine 1 pillow supine 1 pillow supine 1 pillow   LIMB POSITION 75 deg abduction 75 deg abduction 75 deg abduction 75 deg abduction 75 deg abduction   STARTING POINT 17cm from 3rd cuticle 17cm from 3rd cuticle 17cm from 3rd cuticle 17cm from 3rd cuticle 17cm from 3rd cuticle   RIGHT HAND VOLUME 21.7cm across palm 21.7cm across palm 21.7cm across palm 21.7cm across palm 21.7cm across palm   LEFT HAND VOLUME 21.7cm across palm 21.7cm across palm 21.7cm across palm 21.7cm across palm 21.7cm  across palm   MEASUREMENT A 16.3 16.3 16.5 16.5 16.5   MEASUREMENT B 19.1 19.2 19.7 20.1 20.2   MEASUREMENT C 22.9 23.3 23.8 24.5 24.7   MEASUREMENT D 26 26.3 26.3 27.8 27.8   MEASUREMENT E 27.5 27.8 28.1 28.5 28.1   MEASUREMENT F 29.3 29.4 30.1 31 31   MEASUREMENT G 30.5 30.8 31.9 32.8 32.8   MEASUREMENT H 32.5 32.3 34.3 35.8 36   MEASUREMENT I 36.8 37.3 38.3 41.3 41.2    TOTAL VOLUME  5783.09479 7955.34932 5666.35140 5566.55804 2738.98221   DATE MEASURED 1/28/2025 12/9/2024 2/22/2024 12/5/2023 10/25/2023   LOCATION/MEASUREMENTS RUE RUE RUE RUE RUE   MEASUREMENT A 16.5 16.5 17.1 17.1 17   MEASUREMENT B 18.9 18.8 20.4 20.4 20.6   MEASUREMENT C 23.1 23.3 24.5 24.7 24.8   MEASUREMENT D 25.8 26 27.3 27.5 27.7   MEASUREMENT E 27 27.3 28 28.2 28.2   MEASUREMENT F 28.3 27.8 29.1 30.3 30.5   MEASUREMENT G 29.9 30.3 30.4 32 32.6   MEASUREMENT H 33.5 34 34.6 35.6 35.8   MEASUREMENT I 38.1 38.6 38.4 40.2 39.8    TOTAL VOLUME  2368.18222 2400.38223 2540.21723 2677.55600 2705.79985   % DIFFERENCE 0.2252 0.98828 0.80625 2.11316 1.2098             1/22/2025:  Swelling left breast w/ hyperpigmentation, slight pitting medially, hyperplasia decreasing   Trunk Measurement:  - trunk circumference (Level of L nipple); 108.1 cm (IE: 110 cm)      1/8/2025:  Swelling L breast (hyperplasia, hyperpigmentation, + pitting)  Trunk Measurement:  - trunk circumference (Level of L nipple); 108.4 cm (IE: 110 cm)        12/26/2024:  Swelling L breast  Scap strength:   Rhomb: 4/5 B   Mid trap: 4/5 B     12/23/22024:  Swelling L breast (hyperplasia, hyperpigmentation, + pitting)  Trunk Measurement:  - trunk circumference (Level of L nipple); 108.5 cm (IE: 110 cm)  Rash resolved     12/18/2024:  Swelling L breast (hyperplasia, hyperpigmentation, + pitting)  Trunk Measurement:  - trunk circumference (Level of L nipple); 109.2 cm (IE: 110 cm)  Slight rash noted under left breast and in axilla (in skin folds)  Decreased scap strength      12/9/2024  (Evaluation):    AROM/Strength:  B shld AROM WFL, strength WFL, B scap strength grossly 4-/5    Edema/Tissue Observations:    L breast swelling  - hyperpigmentation, hyperplasia, + pitting, decreased scar mobility   No significant arm swelling noted   + impingement left shoulder w/ prolonged overhead positioning     Trunk Measurement:  - trunk circumference (Level of L nipple); 110 cm    ( prior to radiation: 111.1 cm )    Stemmer's Sign: negative   Arm Volume Measurements:       12/9/2024     6:00 AM 2/22/2024     8:00 AM 12/5/2023     6:00 PM 10/25/2023     3:00 PM   Lymphedema Calculations   DATE MEASURED 12/9/2024 2/22/2024 12/5/2023 10/25/2023   LOCATION/MEASUREMENTS [facility]   PATIENT POSITION  supine 1 pillow supine 1 pillow supine 1 pillow supine 1 pillow   LIMB POSITION 75 deg abduction 75 deg abduction 75 deg abduction 75 deg abduction   STARTING POINT 17cm from 3rd cuticle 17cm from 3rd cuticle 17cm from 3rd cuticle 17cm from 3rd cuticle   RIGHT HAND VOLUME 21.7cm across palm 21.7cm across palm 21.7cm across palm 21.7cm across palm   LEFT HAND VOLUME 21.7cm across palm 21.7cm across palm 21.7cm across palm 21.7cm across palm   MEASUREMENT A 16.3 16.5 16.5 16.5   MEASUREMENT B 19.2 19.7 20.1 20.2   MEASUREMENT C 23.3 23.8 24.5 24.7   MEASUREMENT D 26.3 26.3 27.8 27.8   MEASUREMENT E 27.8 28.1 28.5 28.1   MEASUREMENT F 29.4 30.1 31 31   MEASUREMENT G 30.8 31.9 32.8 32.8   MEASUREMENT H 32.3 34.3 35.8 36   MEASUREMENT I 37.3 38.3 41.3 41.2    TOTAL VOLUME  [id number] [id number] 9098.86139 2738.50881   DATE MEASURED 12/9/2024 2/22/2024 12/5/2023 10/25/2023   LOCATION/MEASUREMENTS RUE RUE RUE RUE   MEASUREMENT A 16.5 17.1 17.1 17   MEASUREMENT B 18.8 20.4 20.4 20.6   MEASUREMENT C 23.3 24.5 24.7 24.8   MEASUREMENT D 26 27.3 27.5 27.7   MEASUREMENT E 27.3 28 28.2 28.2   MEASUREMENT F 27.8 29.1 30.3 30.5   MEASUREMENT G 30.3 30.4 32 32.6   MEASUREMENT H 34 34.6 35.6 35.8   MEASUREMENT I 38.6 38.4 40.2  39.8    TOTAL VOLUME  2400.13058 2540.90658 2677.22167 2705.51501   % DIFFERENCE 0.59539 0.19740 2.94909 1.2097          Lymphedema Life Impact Scale: Evaluation Score 12/9/2024: NA pt w/ breast swelling        Today’s Treatment and Response:   1/10/2025 Date: 1/16/2025 1/22/2025 1/28/2025   Visit: #10/15  Certification from 12/9/2024 to 3/9/2025 Visit: #11/15  Certification from 12/9/2024 to 3/9/2025 Visit: #12/15  Certification from 12/9/2024 to 3/9/2025 Visit: #13/15  Certification from 12/9/2024 to 3/9/2025   Manual therapy (30 min)    MLD: neck sequence, abd sequence, L inguinal, A-I, R axilla, A-A, L axilla, L breast. Deep technique on breast.  Manual therapy (35 minutes)      MLD: neck sequence, abd sequence, L inguinal, A-I, R axilla, A-A, L axilla, L breast. Deep technique on breast.     Compression:  -pt wearing foam pad during the day in L breast bra cup and now has swell spot for around the house and when sleeping. Manual Therapy (40 min)    Measurements     MLD: neck sequence, abd sequence, L inguinal, A-I, R axilla, A-A, L axilla, L breast. Deep technique and increased time on breast.     Compression:  -pt wearing foam pad during the day in L breast bra cup, swell spot at night Manual Therapy (45 min)    Measurements    Reviewed self MLD    Fabricated add'l foam compression pads for bra     MLD: neck sequence, abd sequence, L inguinal, A-I, R axilla, A-A, L axilla, L breast. Deep technique and increased time on breast.     There Ex (10 minutes)  -sidelying windmills x 10 w/1#  -sidelying horiz abd/add x 10 w/1#  -side lying abduction x10 w/1#  PROM L shoulder  -standing OH flexion, abduction 2# x 10  -standing Hoirz abduction 2# x 10  *issued for home                 ASSESSMENT: see discharge summary     Goals (discussed and planned with patient involvement):      To be met in 10 visits:  1) Decrease swelling L breast by a total of 3 cm to decrease risks of infection - almost met, continues to improve  and pt will cont w/ self management    2) Pt to be independent with self MLD, ROM exercises, and donning/doffing compression bandages/garments to facilitate swelling reduction and to be independent at self management once discharged- MET   3) Increase B scap strength to at least 4/5 to improve ease of lifting and performing household chores- MET        PLAN:       Discharge from PT         Charges: mm3         Total Timed Treatment: 45min     Total Treatment Time: 45 min

## 2025-01-30 ENCOUNTER — SOCIAL WORK SERVICES (OUTPATIENT)
Age: 50
End: 2025-01-30

## 2025-01-30 NOTE — PROGRESS NOTES
VERITO received a request from [employer] for medical records to be sent dated from September 1, 2024 to present. VERITO faxed records to [employer] at [phone]

## 2025-03-14 ENCOUNTER — TELEPHONE (OUTPATIENT)
Age: 50
End: 2025-03-14

## 2025-03-14 NOTE — TELEPHONE ENCOUNTER
Qasim Lu,     I called and spoke with the pt to obtain ins info. Pt advised she doesn't have ins and will pay out of pocket. She advised she is awaiting approval for FA. I saw you were in contact with the patient, are you able to provide an update?

## 2025-03-15 NOTE — TELEPHONE ENCOUNTER
Thank you Tabitha. I spoke with the patient and advised she will have to reapply for FA. Pt said she has been waiting approval and she just applied in Dec. I informed the pt that the approval was based off the application she submitted in Dec 2024.     Pt asked to speak to  to get assistance with reapplying for FA. I transferred pt to [facility].    Please give the pt a call back

## 2025-03-19 ENCOUNTER — SOCIAL WORK SERVICES (OUTPATIENT)
Age: 50
End: 2025-03-19

## 2025-03-19 NOTE — PROGRESS NOTES
SW met with patient to assistance with application for financial assistance. SW forwarded application to financial assistance at [email or web address] and [email or web address].    Patient provided SW with disability paperwork that she needs completed. SW forwarded the paperwork to Forms for completion.

## 2025-03-20 ENCOUNTER — TELEPHONE (OUTPATIENT)
Age: 50
End: 2025-03-20

## 2025-03-24 NOTE — TELEPHONE ENCOUNTER
Received [employer] forms via email from office, Authorization statement on form, logged for processing.

## 2025-03-24 NOTE — TELEPHONE ENCOUNTER
Received forms from [employer] 3/24/25. Emailed forms to the Forms Dept 3/24/25 at 9:39am

## 2025-03-25 NOTE — TELEPHONE ENCOUNTER
Dr. Heaton,       Please sign off on form if you agree to: [employer] Forms/ Disability   (place your signature on the first page only)    -From your Inbasket, Highlight the patient and click Chart   -Double click the 03/20/2025 Forms Completion telephone encounter  -Scroll down to the Media section   -Click the blue Hyperlink: Life [employer]/ disability Dr. Montrell Heaton 03/25/2025  -Click Acknowledge located in the top right ribbon/menu   -Drag the mouse into the blank space of the document and a + sign will appear. Left click to   electronically sign the document.     Thank you,  Cristin RUSS

## 2025-03-26 ENCOUNTER — APPOINTMENT (OUTPATIENT)
Age: 50
End: 2025-03-26
Attending: INTERNAL MEDICINE

## 2025-04-02 NOTE — TELEPHONE ENCOUNTER
Ins forms signed and faxed to [employer] sending [employer] message to adv patient. Archiving forms.

## 2025-06-12 ENCOUNTER — OFFICE VISIT (OUTPATIENT)
Age: 50
End: 2025-06-12
Attending: INTERNAL MEDICINE

## 2025-06-12 VITALS
HEART RATE: 72 BPM | TEMPERATURE: 99 F | SYSTOLIC BLOOD PRESSURE: 123 MMHG | WEIGHT: 197 LBS | RESPIRATION RATE: 18 BRPM | BODY MASS INDEX: 32.82 KG/M2 | HEIGHT: 65 IN | DIASTOLIC BLOOD PRESSURE: 81 MMHG | OXYGEN SATURATION: 99 %

## 2025-06-12 DIAGNOSIS — T45.1X5A CHEMOTHERAPY-INDUCED PERIPHERAL NEUROPATHY (HCC): ICD-10-CM

## 2025-06-12 DIAGNOSIS — Z79.810 ENCOUNTER FOR MONITORING TAMOXIFEN THERAPY: ICD-10-CM

## 2025-06-12 DIAGNOSIS — C50.112 MALIGNANT NEOPLASM OF CENTRAL PORTION OF LEFT BREAST IN FEMALE, ESTROGEN RECEPTOR POSITIVE (HCC): Primary | ICD-10-CM

## 2025-06-12 DIAGNOSIS — Z78.0 POST-MENOPAUSAL: Primary | ICD-10-CM

## 2025-06-12 DIAGNOSIS — Z17.0 MALIGNANT NEOPLASM OF CENTRAL PORTION OF LEFT BREAST IN FEMALE, ESTROGEN RECEPTOR POSITIVE (HCC): Primary | ICD-10-CM

## 2025-06-12 DIAGNOSIS — Z08 ENCOUNTER FOR FOLLOW-UP SURVEILLANCE OF BREAST CANCER: ICD-10-CM

## 2025-06-12 DIAGNOSIS — G62.0 CHEMOTHERAPY-INDUCED PERIPHERAL NEUROPATHY (HCC): ICD-10-CM

## 2025-06-12 DIAGNOSIS — Z85.3 ENCOUNTER FOR FOLLOW-UP SURVEILLANCE OF BREAST CANCER: ICD-10-CM

## 2025-06-12 DIAGNOSIS — N91.2 AMENORRHEA: ICD-10-CM

## 2025-06-12 DIAGNOSIS — Z51.81 ENCOUNTER FOR MONITORING TAMOXIFEN THERAPY: ICD-10-CM

## 2025-06-12 DIAGNOSIS — D72.810 LYMPHOPENIA: ICD-10-CM

## 2025-06-12 LAB
BASOPHILS # BLD AUTO: 0.03 X10(3) UL (ref 0–0.2)
BASOPHILS NFR BLD AUTO: 0.6 %
DEPRECATED RDW RBC AUTO: 40.6 FL (ref 35.1–46.3)
EOSINOPHIL # BLD AUTO: 0.13 X10(3) UL (ref 0–0.7)
EOSINOPHIL NFR BLD AUTO: 2.7 %
ERYTHROCYTE [DISTWIDTH] IN BLOOD BY AUTOMATED COUNT: 11.9 % (ref 11–15)
ESTRADIOL SERPL-MCNC: 21.3 PG/ML
FSH SERPL-ACNC: 32.8 MIU/ML
HCT VFR BLD AUTO: 36.6 % (ref 35–48)
HGB BLD-MCNC: 12.4 G/DL (ref 12–16)
IMM GRANULOCYTES # BLD AUTO: 0 X10(3) UL (ref 0–1)
IMM GRANULOCYTES NFR BLD: 0 %
LH SERPL-ACNC: 20.7 MIU/ML
LYMPHOCYTES # BLD AUTO: 1.49 X10(3) UL (ref 1–4)
LYMPHOCYTES NFR BLD AUTO: 30.6 %
MCH RBC QN AUTO: 32 PG (ref 26–34)
MCHC RBC AUTO-ENTMCNC: 33.9 G/DL (ref 31–37)
MCV RBC AUTO: 94.3 FL (ref 80–100)
MONOCYTES # BLD AUTO: 0.38 X10(3) UL (ref 0.1–1)
MONOCYTES NFR BLD AUTO: 7.8 %
NEUTROPHILS # BLD AUTO: 2.84 X10 (3) UL (ref 1.5–7.7)
NEUTROPHILS # BLD AUTO: 2.84 X10(3) UL (ref 1.5–7.7)
NEUTROPHILS NFR BLD AUTO: 58.3 %
PLATELET # BLD AUTO: 247 10(3)UL (ref 150–450)
RBC # BLD AUTO: 3.88 X10(6)UL (ref 3.8–5.3)
WBC # BLD AUTO: 4.9 X10(3) UL (ref 4–11)

## 2025-06-12 NOTE — PATIENT INSTRUCTIONS
VISIT SUMMARY:  You came in today for a follow-up visit regarding your breast cancer. We discussed your current symptoms, treatment, and overall health status. You reported no new changes in your breast, improvement in lymphedema, and manageable hot flashes. We also reviewed your peripheral neuropathy and general wellness.    YOUR PLAN:  -BREAST CANCER, LEFT SIDE: Breast cancer is a type of cancer that forms in the cells of the breasts. You reported no new changes in your breast and improvement in lymphedema. You are continuing on tamoxifen with manageable hot flashes. We will check your hormone levels to confirm if you are postmenopausal, and if so, we may switch you to letrozole, which has shown better outcomes in reducing recurrence risk in postmenopausal women. We will also order a mammogram for your left breast and discuss potential side effects of letrozole, including hot flashes, joint ache, stiffness, and bone density changes. A baseline bone density scan will be ordered if your postmenopausal status is confirmed.    -LYMPHEDEMA, LEFT BREAST: Lymphedema is swelling that generally occurs in one of your arms or legs, often caused by the removal of or damage to your lymph nodes. Your lymphedema has improved post-therapy. Continue with your home exercise program, massage, and use of high-support sports bra and compression garments at night.    -POSTMENOPAUSAL STATE: Being postmenopausal means you have not had a menstrual period for 12 consecutive months. Your last menstrual period was in January 2024, suggesting you are postmenopausal. We will check your hormone levels to confirm this status.    -PERIPHERAL NEUROPATHY: Peripheral neuropathy is a result of damage to the nerves outside of the brain and spinal cord, often causing weakness, numbness, and pain, usually in your hands and feet. You have tingling in your fingers and feet, which has slightly improved. Continue taking gabapentin 100 mg three times a day,  and we will monitor your symptoms and adjust the dosage if needed.    -LEUKOPENIA: Leukopenia is a condition where you have a lower than normal number of white blood cells, which can make you more susceptible to infections. You had leukopenia post-chemotherapy, and we will repeat a complete blood count (CBC) to assess your current status.    INSTRUCTIONS:  1. Get hormone level tests (estradiol, FSH, LH) to confirm postmenopausal status.  2. If postmenopausal status is confirmed, switch from tamoxifen to letrozole.  3. Get a mammogram for your left breast.  4. Discuss potential side effects of letrozole, including hot flashes, joint ache, stiffness, and bone density changes.  5. Get a baseline bone density scan if postmenopausal status is confirmed.  6. Continue your home exercise program and massage for lymphedema maintenance.  7. Continue using high-support sports bra and compression garments at night.  8. Continue taking gabapentin 100 mg three times a day for peripheral neuropathy.  9. Get a complete blood count (CBC) to assess leuk  ocyte and lymphocyte levels.    Contains text generated by Cj

## 2025-06-12 NOTE — PROGRESS NOTES
HPI   Alisia Berg is a 49 year old female here at the request of NORBERTO SERNA MD for evaluation of   Encounter Diagnoses   Name Primary?    Malignant neoplasm of central portion of left breast in female, estrogen receptor positive (HCC) Yes    Encounter for monitoring tamoxifen therapy     Encounter for follow-up surveillance of breast cancer     Chemotherapy-induced peripheral neuropathy (HCC)        Patient here status post left-sided lumpectomy with axillary lymph node dissection on 11/6/2023.  Patient 425 positive lymph nodes.  She completed course of adjuvant chemotherapy with 4 cycles of dose dense AC-T followed by weekly Taxol.    She completed radiation therapy to the left breast and supraclavicular fossa.  Treatment started from 6/10/2024 and completed on 7/22/2024.    History of Present Illness  The patient, with breast cancer, presents for a follow-up visit.    Breast cancer surveillance and local symptoms  - No changes detected on monthly breast self-examinations  - Left breast previously felt heavier and larger post-surgery and treatment, with swelling at the inferior aspect  - Lymphedema therapy has improved swelling; continues home exercise program and massage for maintenance  - Uses high-support sports bra and compression garment at night, which provide symptomatic relief  - Routine mammogram in September 2024 and MRI in January 2025 without reported abnormalities    Endocrine therapy and vasomotor symptoms  - Takes tamoxifen daily  - Experiences manageable hot flashes, described as 'on and off' with sensation of feeling cold after sweating  - No new or worsening joint discomfort beyond baseline  - Last menstrual period in January 2024, suggesting postmenopausal status  - No recent gynecological examination  - No vaginal bleeding or pelvic pain    Peripheral neuropathy  - Tingling in fingers and feet attributed to neuropathy, with slight improvement  - Takes gabapentin 100 mg three  times daily, uncertain efficacy  - Tingling exacerbated by certain hand positions, such as during biking  - No neuropathic pain  - Sleep has improved since 2024    Constitutional and systemic symptoms  - No fatigue, cough, shortness of breath, chest pain, abdominal pain, or significant back pain  - Appetite is good  - Weight is stable    Physical activity and wellness  - Engages in [employer], qigong, and meditation, which provide perceived benefit      LMP 2024      ECOG PS  0    Review of Systems:   Review of Systems - Oncology    10 point ROS pertinent per HPI.    Current Outpatient Medications   Medication Sig Dispense Refill    gabapentin 100 MG Oral Cap Take 1 capsule (100 mg total) by mouth 3 (three) times daily. 270 capsule 3    tamoxifen 20 MG Oral Tab Take 1 tablet (20 mg total) by mouth daily. 90 tablet 3    losartan 50 MG Oral Tab Take 1 tablet (50 mg total) by mouth at bedtime.       Allergies:   No Known Allergies    Past Medical History:    Breast CA (HCC)    Fall     Cancer (HCC)    High blood pressure    Hypertension    Trigeminal neuralgia of left side of face     Past Surgical History:   Procedure Laterality Date    Chemotherapy      Lumpectomy left  2023    Needle biopsy right Right     bx done at age 20yrs old          x2    Radiation left       Social History     Socioeconomic History    Marital status:    Tobacco Use    Smoking status: Never    Smokeless tobacco: Never   Substance and Sexual Activity    Alcohol use: Never    Drug use: Never       Family History   Problem Relation Age of Onset    Breast Cancer Self 48    Breast Cancer Mother 34    Cancer Paternal Grandfather         unknown type    Ovarian Cancer Neg          PHYSICAL EXAM:    /81 (BP Location: Right arm, Patient Position: Sitting, Cuff Size: large)   Pulse 72   Temp 98.6 °F (37 °C) (Oral)   Resp 18   Ht 1.651 m (5' 5\")   Wt 89.4 kg (197 lb)   LMP 01/10/2024 (Approximate)   SpO2  99%   BMI 32.78 kg/m²   Wt Readings from Last 6 Encounters:   06/12/25 89.4 kg (197 lb)   12/04/24 88.8 kg (195 lb 12.8 oz)   10/14/24 88.5 kg (195 lb)   09/25/24 89.4 kg (197 lb)   09/04/24 90.5 kg (199 lb 9.6 oz)   07/26/24 93.5 kg (206 lb 3.2 oz)     Physical Exam  Physical Exam  GENERAL: Alert, cooperative, well developed, no acute distress.  HEENT: Normocephalic, normal oropharynx, moist mucous membranes.  NECK: No cervical or supraclavicular lymphadenopathy.  CHEST: Clear to auscultation bilaterally, no wheezes, rhonchi, or crackles.  CARDIOVASCULAR: Regular heart rate and rhythm, S1 and S2 normal without murmurs.  BREAST: No breast masses or lumps, no right or left axillary lymphadenopathy. Left breast asymmetry post-surgery and radiation, scar by left armpit, scar above left areola, hyperpigmented skin on left breast, lymphedema in lower inner quadrant of left breast.  ABDOMEN: Soft, non-tender, non-distended, without organomegaly, normal bowel sounds.  EXTREMITIES: No cyanosis or edema, no leg swelling.  NEUROLOGICAL: Cranial nerves grossly intact, moves all extremities without gross motor or sensory deficit.          ASSESSMENT/PLAN:     1. Malignant neoplasm of central portion of left breast in female, estrogen receptor positive (HCC)    2. Encounter for monitoring tamoxifen therapy    3. Encounter for follow-up surveillance of breast cancer    4. Chemotherapy-induced peripheral neuropathy (HCC)        1. Malignant neoplasm of central portion of left breast in female, estrogen receptor positive    Cancer Staging   Malignant neoplasm of central portion of left breast in female, estrogen receptor positive (HCC)  Staging form: Breast, AJCC 8th Edition  - Clinical stage from 9/29/2023: Stage IB (cT1c, cN1(f), cM0, G2, ER+, NH+, HER2-) - Signed by Montrell Heaton MD on 9/29/2023  - Pathologic stage from 11/15/2023: Stage IB (pT1c, pN2a, cM0, G2, ER+, NH+, HER2-) - Signed by Montrell Heaton MD on  11/15/2023    -Genetic testing was negative.    -Patient underwent a left-sided lumpectomy with sentinel lymph node which was positive, and completion axillary dissection on 11/6/2023.  She had 4 of 25 positive lymph nodes.    -Patient completed adjuvant treatment with dose dense Adriamycin Cytoxan followed by weekly Taxol.     -She completed course of radiation therapy to the neck breast and supraclavicular fossa from 6/28/2024 to 7/22/2024    - Discussed the role of adjuvant hormonal therapy to decrease the risk of future recurrence of her breast cancer. Discussed with patient that given her initial staging, as well as residual disease, she is a candidate for 10 years of adjuvant endocrine therapy, as well as she is also a candidate for 2 years of Abemaciclib while the first 2 years of adjuvant endocrine therapy. Given that she is premenopausal, recommend ovarian function suppression as well.     -I discussed with patient that we also have available clinical trial, ERI-2, which is comparing a new oral SERD, camizestrant versus standard of care endocrine therapy. Information was provided to the patient for consideration. She is still considering.      - She declined abemaciclib.      Assessment & Plan  Breast cancer, left side  Follow-up for left-sided breast cancer. No new changes in the breast. Reports improvement in lymphedema post-therapy. Continues on tamoxifen with manageable hot flashes. Discussion on switching to letrozole if confirmed postmenopausal due to better outcomes in reducing recurrence risk in postmenopausal women. Letrozole has shown superior disease-free survival and overall survival compared to tamoxifen in postmenopausal women, with a 5-year disease-free survival of 84% for letrozole versus 81.4% for tamoxifen.  - Order hormone level tests (estradiol, FSH, LH) to confirm postmenopausal status  - Switch from tamoxifen to letrozole if postmenopausal status is confirmed.  - Hormone therapy  for 10 yrs until end of July 2034.  - Order mammogram for the left breast  - Discuss potential side effects of letrozole including hot flashes, joint ache, stiffness, and bone density changes  - Order baseline bone density scan if postmenopausal status is confirmed    Lymphedema, left breast  Lymphedema in the left breast has improved post-therapy. Continues with home exercise program, massage, and uses high-support sports bra and compression garments at night.  - Continue home exercise program and massage for lymphedema maintenance  - Continue using high-support sports bra and compression garments    Postmenopausal state  Likely postmenopausal as last menstrual period was in January 2024. Hormone levels to be checked to confirm status.  - Order hormone level tests (estradiol, FSH, LH) to confirm postmenopausal status    Peripheral neuropathy  Peripheral neuropathy with tingling in fingers, improved since last visit. No burning sensation reported. Currently on gabapentin 100 mg three times a day. Unable to determine if gabapentin is effective. No longer taking carbamazepine.  - Continue gabapentin 100 mg three times a day  - Monitor neuropathy symptoms and adjust gabapentin dosage if symptoms worsen    Leukopenia  Previous leukopenia with low lymphocytes post-chemotherapy. No recent blood work since July 2024. Plan to repeat CBC to assess current status.  - Order CBC to assess leukocyte and lymphocyte levels    Recording duration: 24 minutes      - Survivorship issues were addressed with the patient.  Patient-reported issues included insomnia, hot flashes , and financial concerns.  Recommended interventions as follows:  recommended flax seed oil for hot flashes, talk to SW regarding financial assistance.    The following individual(s) BAL Keith verbally consented to be recorded using Ambient AI technology and understand that they can withdraw their consent to listening technology at any point by asking the  clinician to turn off or pause the recording.     No follow-ups on file.    No orders of the defined types were placed in this encounter.    MDM moderate risk.  I have a longitudinal and continuous care relationship with this patient for the management of breast cancer, a serious or complex condition.  is applicable because the patient's medical record notes over time support that there is a longitudinal care relationship with me, the care plan reflects the ongoing nature of the continuous relationship of care, and the medical record indicates that there is ongoing treatment of a serious/complex medical condition which I am currently managing.     Results From Past 48 Hours:  No results found for this or any previous visit (from the past 48 hours).    Results  LABS  CBC: WBC low, lymphocytes low (07/09/2024)    RADIOLOGY  Bilateral Breast MRI: Normal (01/2025)  Bilateral Breast Mammogram: Recommended follow-up in 3 months (09/2024)    Imaging & Referrals:  None

## 2025-06-30 ENCOUNTER — HOSPITAL ENCOUNTER (OUTPATIENT)
Dept: BONE DENSITY | Age: 50
Discharge: HOME | End: 2025-06-30
Attending: INTERNAL MEDICINE

## 2025-06-30 DIAGNOSIS — Z78.0 POST-MENOPAUSAL: ICD-10-CM

## 2025-06-30 PROCEDURE — 77080 DXA BONE DENSITY AXIAL: CPT | Performed by: INTERNAL MEDICINE

## 2025-07-02 ENCOUNTER — HOSPITAL ENCOUNTER (OUTPATIENT)
Dept: ULTRASOUND IMAGING | Facility: HOSPITAL | Age: 50
Discharge: HOME | End: 2025-07-02
Attending: SURGERY

## 2025-07-02 ENCOUNTER — HOSPITAL ENCOUNTER (OUTPATIENT)
Dept: MAMMOGRAPHY | Facility: HOSPITAL | Age: 50
Discharge: HOME | End: 2025-07-02
Attending: SURGERY

## 2025-07-02 DIAGNOSIS — Z17.0 MALIGNANT NEOPLASM OF CENTRAL PORTION OF LEFT BREAST IN FEMALE, ESTROGEN RECEPTOR POSITIVE (HCC): ICD-10-CM

## 2025-07-02 DIAGNOSIS — C50.112 MALIGNANT NEOPLASM OF CENTRAL PORTION OF LEFT BREAST IN FEMALE, ESTROGEN RECEPTOR POSITIVE (HCC): ICD-10-CM

## 2025-07-02 PROCEDURE — 77065 DX MAMMO INCL CAD UNI: CPT | Performed by: SURGERY

## 2025-07-02 PROCEDURE — 77065 DX MAMMO INCL CAD UNI: CPT | Performed by: RADIOLOGY

## 2025-07-02 PROCEDURE — 77061 BREAST TOMOSYNTHESIS UNI: CPT | Performed by: SURGERY

## 2025-07-02 PROCEDURE — G0279 TOMOSYNTHESIS, MAMMO: HCPCS | Performed by: RADIOLOGY

## 2025-07-02 PROCEDURE — 76642 ULTRASOUND BREAST LIMITED: CPT | Performed by: RADIOLOGY

## 2025-07-03 ENCOUNTER — PATIENT MESSAGE (OUTPATIENT)
Age: 50
End: 2025-07-03

## 2025-07-03 RX ORDER — LETROZOLE 2.5 MG/1
2.5 TABLET, FILM COATED ORAL DAILY
Qty: 30 TABLET | Refills: 6 | Status: SHIPPED | OUTPATIENT
Start: 2025-07-03

## 2025-08-06 ENCOUNTER — OFFICE VISIT (OUTPATIENT)
Dept: RADIATION ONCOLOGY | Facility: HOSPITAL | Age: 50
End: 2025-08-06
Attending: INTERNAL MEDICINE

## 2025-08-06 VITALS
OXYGEN SATURATION: 98 % | TEMPERATURE: 98 F | SYSTOLIC BLOOD PRESSURE: 133 MMHG | WEIGHT: 202.38 LBS | HEART RATE: 64 BPM | RESPIRATION RATE: 18 BRPM | DIASTOLIC BLOOD PRESSURE: 84 MMHG | BODY MASS INDEX: 34 KG/M2

## 2025-08-06 DIAGNOSIS — C50.912 MALIGNANT NEOPLASM OF LEFT BREAST IN FEMALE, ESTROGEN RECEPTOR POSITIVE, UNSPECIFIED SITE OF BREAST (HCC): Primary | ICD-10-CM

## 2025-08-06 DIAGNOSIS — Z17.0 MALIGNANT NEOPLASM OF LEFT BREAST IN FEMALE, ESTROGEN RECEPTOR POSITIVE, UNSPECIFIED SITE OF BREAST (HCC): Primary | ICD-10-CM

## 2025-08-06 PROCEDURE — 99211 OFF/OP EST MAY X REQ PHY/QHP: CPT

## 2025-08-13 ENCOUNTER — OFFICE VISIT (OUTPATIENT)
Dept: OBGYN CLINIC | Facility: CLINIC | Age: 50
End: 2025-08-13

## 2025-08-13 VITALS
WEIGHT: 202 LBS | DIASTOLIC BLOOD PRESSURE: 77 MMHG | BODY MASS INDEX: 34 KG/M2 | SYSTOLIC BLOOD PRESSURE: 116 MMHG | HEART RATE: 71 BPM

## 2025-08-13 DIAGNOSIS — Z01.419 ENCOUNTER FOR WELL WOMAN EXAM WITH ROUTINE GYNECOLOGICAL EXAM: Primary | ICD-10-CM

## 2025-08-13 DIAGNOSIS — Z12.11 SCREENING FOR COLON CANCER: ICD-10-CM

## 2025-08-13 PROCEDURE — 99386 PREV VISIT NEW AGE 40-64: CPT | Performed by: STUDENT IN AN ORGANIZED HEALTH CARE EDUCATION/TRAINING PROGRAM

## 2025-08-14 LAB — HPV E6+E7 MRNA CVX QL NAA+PROBE: NEGATIVE

## 2025-08-19 ENCOUNTER — NURSE ONLY (OUTPATIENT)
Facility: CLINIC | Age: 50
End: 2025-08-19

## 2025-08-19 DIAGNOSIS — Z12.11 COLON CANCER SCREENING: Primary | ICD-10-CM

## 2025-08-25 ENCOUNTER — OFFICE VISIT (OUTPATIENT)
Facility: CLINIC | Age: 50
End: 2025-08-25

## 2025-08-25 ENCOUNTER — TELEPHONE (OUTPATIENT)
Facility: CLINIC | Age: 50
End: 2025-08-25

## 2025-08-25 VITALS
BODY MASS INDEX: 34 KG/M2 | SYSTOLIC BLOOD PRESSURE: 113 MMHG | DIASTOLIC BLOOD PRESSURE: 76 MMHG | WEIGHT: 202 LBS | OXYGEN SATURATION: 98 % | HEART RATE: 83 BPM

## 2025-08-25 DIAGNOSIS — C50.112 MALIGNANT NEOPLASM OF CENTRAL PORTION OF LEFT BREAST IN FEMALE, ESTROGEN RECEPTOR POSITIVE (HCC): Primary | ICD-10-CM

## 2025-08-25 DIAGNOSIS — R92.8 ABNORMAL MRI, BREAST: ICD-10-CM

## 2025-08-25 DIAGNOSIS — Z17.0 MALIGNANT NEOPLASM OF CENTRAL PORTION OF LEFT BREAST IN FEMALE, ESTROGEN RECEPTOR POSITIVE (HCC): Primary | ICD-10-CM

## 2025-08-26 ENCOUNTER — ANESTHESIA EVENT (OUTPATIENT)
Dept: ENDOSCOPY | Facility: HOSPITAL | Age: 50
End: 2025-08-26

## 2025-08-26 ENCOUNTER — HOSPITAL ENCOUNTER (OUTPATIENT)
Facility: HOSPITAL | Age: 50
Setting detail: HOSPITAL OUTPATIENT SURGERY
Discharge: HOME | End: 2025-08-26
Attending: INTERNAL MEDICINE | Admitting: INTERNAL MEDICINE

## 2025-08-26 ENCOUNTER — ANESTHESIA (OUTPATIENT)
Dept: ENDOSCOPY | Facility: HOSPITAL | Age: 50
End: 2025-08-26

## 2025-08-26 VITALS
HEART RATE: 64 BPM | BODY MASS INDEX: 33.66 KG/M2 | SYSTOLIC BLOOD PRESSURE: 123 MMHG | RESPIRATION RATE: 11 BRPM | DIASTOLIC BLOOD PRESSURE: 83 MMHG | HEIGHT: 65 IN | OXYGEN SATURATION: 98 % | WEIGHT: 202 LBS

## 2025-08-26 DIAGNOSIS — Z12.11 COLON CANCER SCREENING: ICD-10-CM

## 2025-08-26 PROCEDURE — 45378 DIAGNOSTIC COLONOSCOPY: CPT | Performed by: INTERNAL MEDICINE

## 2025-08-26 RX ORDER — NALOXONE HYDROCHLORIDE 0.4 MG/ML
0.08 INJECTION, SOLUTION INTRAMUSCULAR; INTRAVENOUS; SUBCUTANEOUS ONCE AS NEEDED
Status: DISCONTINUED | OUTPATIENT
Start: 2025-08-26 | End: 2025-08-26

## 2025-08-26 RX ORDER — SODIUM CHLORIDE, SODIUM LACTATE, POTASSIUM CHLORIDE, CALCIUM CHLORIDE 600; 310; 30; 20 MG/100ML; MG/100ML; MG/100ML; MG/100ML
INJECTION, SOLUTION INTRAVENOUS CONTINUOUS
Status: DISCONTINUED | OUTPATIENT
Start: 2025-08-26 | End: 2025-08-26

## 2025-08-26 RX ORDER — LIDOCAINE HYDROCHLORIDE 10 MG/ML
INJECTION, SOLUTION EPIDURAL; INFILTRATION; INTRACAUDAL; PERINEURAL AS NEEDED
Status: DISCONTINUED | OUTPATIENT
Start: 2025-08-26 | End: 2025-08-26 | Stop reason: SURG

## 2025-08-26 RX ADMIN — LIDOCAINE HYDROCHLORIDE 50 MG: 10 INJECTION, SOLUTION EPIDURAL; INFILTRATION; INTRACAUDAL; PERINEURAL at 11:15:00

## 2025-08-26 RX ADMIN — SODIUM CHLORIDE, SODIUM LACTATE, POTASSIUM CHLORIDE, CALCIUM CHLORIDE: 600; 310; 30; 20 INJECTION, SOLUTION INTRAVENOUS at 11:12:00

## (undated) DEVICE — 6 ML SYRINGE LUER-LOCK TIP: Brand: MONOJECT

## (undated) DEVICE — SUTURE PDS II 3-0 Z683G

## (undated) DEVICE — OR TOWEL, 17" X 26" STERILE, BLUE: Brand: PREMIERPRO

## (undated) DEVICE — BRA SURG ELIZ PINK 2XL

## (undated) DEVICE — 3M™ STERI-STRIP™ REINFORCED ADHESIVE SKIN CLOSURES, R1547, 1/2 IN X 4 IN (12 MM X 100 MM), 6 STRIPS/ENVELOPE: Brand: 3M™ STERI-STRIP™

## (undated) DEVICE — CONTAINER,SPECIMEN,OR STERILE,4OZ: Brand: MEDLINE

## (undated) DEVICE — FLEXIBLE YANKAUER,MEDIUM TIP, NO VACUUM CONTROL: Brand: ARGYLE

## (undated) DEVICE — DRAPE PACK CHEST

## (undated) DEVICE — Device

## (undated) DEVICE — ADHESIVE LIQ 2/3ML VI MASTISOL

## (undated) DEVICE — GAMMEX® PI HYBRID SIZE 6.5, STERILE POWDER-FREE SURGICAL GLOVE, POLYISOPRENE AND NEOPRENE BLEND: Brand: GAMMEX

## (undated) DEVICE — DRAPE TAPE: Brand: CONVERTORS

## (undated) DEVICE — SUT SLK 2-0 18IN FS BK

## (undated) DEVICE — FORCEP CUSH BAY BP DISP 7.5IN

## (undated) DEVICE — CABLE BPLR L12FT FLYING LD DISP

## (undated) DEVICE — Device: Brand: JELCO

## (undated) DEVICE — PAD,ABDOMINAL,8"X7.5",STERILE,LF,1/PK: Brand: MEDLINE

## (undated) DEVICE — SUTURE VCRL SZ 3-0 L27IN ABSRB UD L26MM SH

## (undated) DEVICE — SOLUTION IRRIG 1000ML 0.9% NACL USP BTL

## (undated) DEVICE — TUBING SCT CLR 6FT .25IN MDVC

## (undated) DEVICE — EVACUATOR SUR 100CC SIL BLB WND

## (undated) DEVICE — MINOR GENERAL: Brand: MEDLINE INDUSTRIES, INC.

## (undated) DEVICE — CLIP SM INTNL HMCLP TNTLM ESCP

## (undated) DEVICE — KIT ENDO ORCAPOD 160/180/190

## (undated) DEVICE — SUTURE MCRYL SZ 4-0 L18IN ABSRB UD L19MM PS-2

## (undated) DEVICE — DRAIN SURG W7MMXL20CM SIL HUBLESS FLAT FLL

## (undated) DEVICE — 12 ML SYRINGE LUER-LOCK TIP: Brand: MONOJECT

## (undated) DEVICE — SPONGE GZ 4XL4IN 100% COT 12 PLY TYP VII WVN

## (undated) DEVICE — CLIP MED INTNL HMCLP TNTLM

## (undated) DEVICE — KIT MFLD FOR SPEC COLL

## (undated) DEVICE — KIT CLEAN ENDOKIT 1.1OZ GOWNX2

## (undated) NOTE — Clinical Note
Hi, You will see her next week.  Please let me know anticipated date of treatment completion, so we can get her back to see me to start adjuvant endocrine therapy. Thanks, AG

## (undated) NOTE — LETTER
[address] [place], [place]  Authorization for Surgical Operation and Procedure                                                                                           I hereby authorize DR. Wilfred Sanchez, my physician and his/her assistants (if applicable), which may include medical students, residents, and/or fellows, to perform the following surgical operation/ procedure and administer such anesthesia as may be determined necessary by my physician: ULTRASOUND GUIDED LEFT BREAST BIOPSY AND LEFT AXILLARY LYMPH NODE BIOPSY WITH CLIP PLACEMENT TO [address] [facility] on [address] [facility]   2. I recognize that during the surgical operation/procedure, unforeseen conditions may necessitate additional or different procedures than those listed above. I, therefore, further authorize and request that the above-named surgeon, assistants, or designees perform such procedures as are, in their judgment, necessary and desirable. 3.   My surgeon/physician has discussed prior to my surgery the potential benefits, risks and side effects of this procedure; the likelihood of achieving goals; and potential problems that might occur during recuperation. They also discussed reasonable alternatives to the procedure, including risks, benefits, and side effects related to the alternatives and risks related to not receiving this procedure. I have had all my questions answered and I acknowledge that no guarantee has been made as to the result that may be obtained. 4.   Should the need arise during my operation/procedure, which includes change of level of care prior to discharge, I also consent to the administration of blood and/or blood products. Further, I understand that despite careful testing and screening of blood or blood products by collecting agencies, I may still be subject to ill effects as a result of receiving a blood transfusion and/or blood products.   The following are some, but not all, of the potential risks that can occur: fever and allergic reactions, hemolytic reactions, transmission of diseases such as Hepatitis, AIDS and Cytomegalovirus (CMV) and fluid overload. In the event that I wish to have an autologous transfusion of my own blood, or a directed donor transfusion, I will discuss this with my physician. Check only if Refusing Blood or Blood Products  I understand refusal of blood or blood products as deemed necessary by my physician may have serious consequences to my condition to include possible death. I hereby assume responsibility for my refusal and release the hospital, its personnel, and my physicians from any responsibility for the consequences of my refusal.    o  Refuse   5. I authorize the use of any specimen, organs, tissues, body parts or foreign objects that may be removed from my body during the operation/procedure for diagnosis, research or teaching purposes and their subsequent disposal by hospital authorities. I also authorize the release of specimen test results and/or written reports to my treating physician on the hospital medical staff or other referring or consulting physicians involved in my care, at the discretion of the Pathologist or my treating physician. 6.   I consent to the photographing or videotaping of the operations or procedures to be performed, including appropriate portions of my body for medical, scientific, or educational purposes, provided my identity is not revealed by the pictures or by descriptive texts accompanying them. If the procedure has been photographed/videotaped, the surgeon will obtain the original picture, image, videotape or CD. The hospital will not be responsible for storage, release or maintenance of the picture, image, tape or CD.    7.   I consent to the presence of a  or observers in the operating room as deemed necessary by my physician or their designees.     8.   I recognize that in the event my procedure results in extended X-Ray/fluoroscopy time, I may develop a skin reaction. 9. If I have a Do Not Attempt Resuscitation (DNAR) order in place, that status will be suspended while in the operating room, procedural suite, and during the recovery period unless otherwise explicitly stated by me (or a person authorized to consent on my behalf). The surgeon or my attending physician will determine when the applicable recovery period ends for purposes of reinstating the DNAR order. 10. Patients having a sterilization procedure: I understand that if the procedure is successful the results will be permanent and it will therefore be impossible for me to inseminate, conceive, or bear children. I also understand that the procedure is intended to result in sterility, although the result has not been guaranteed. 11. I acknowledge that my physician has explained sedation/analgesia administration to me including the risk and benefits I consent to the administration of sedation/analgesia as may be necessary or desirable in the judgment of my physician. I CERTIFY THAT I HAVE READ AND FULLY UNDERSTAND THE ABOVE CONSENT TO OPERATION and/or OTHER PROCEDURE.     _________________________________________ _________________________________     ___________________________________  Signature of Patient     Signature of Responsible Person                   Printed Name of Responsible Person                              _________________________________________ ______________________________        ___________________________________  Signature of Witness         Date  Time         Relationship to Patient    STATEMENT OF PHYSICIAN My signature below affirms that prior to the time of the procedure; I have explained to the patient and/or his/her legal representative, the risks and benefits involved in the proposed treatment and any reasonable alternative to the proposed treatment.  I have also explained the risks and benefits involved in refusal of the proposed treatment and alternatives to the proposed treatment and have answered the patient's questions.  If I have a significant financial interest in a co-management agreement or a significant financial interest in any product or implant, or other significant relationship used in this procedure/surgery, I have disclosed this and had a discussion with my patient.     _______________________________________________________________ _____________________________  Kareen Hu of Physician)                                                                                         (Date)                                   (Time)  Patient Name: Miroslava Arnold    : 1975   Printed: 2023      Medical Record #: [id number]                                              Page 1 of 1

## (undated) NOTE — MR AVS SNAPSHOT
After Visit Summary   5/1/2024    Alisia Berg   MRN: [id number]           Visit Information     Date & Time  5/1/2024 11:30 AM Provider  [facility] INFRN 7 Department  [facility] - Infusion Dept. Phone  [phone]      Your Vitals Were  Most recent update: 5/1/2024 11:53 AM    BP   123/74 (BP Location: Right arm, Patient Position: Sitting, Cuff Size: large)          Pulse   75          Temp   98.7 °F (37.1 °C) (Oral)          Resp   16          LMP   12/15/2023 (Approximate)             SpO2   97%         Allergies as of 5/1/2024  Review status set to In Progress on 4/24/2024   No Known Allergies     Your Current Medications        Dosage    prochlorperazine (COMPAZINE) 10 mg tablet Take 1 tablet (10 mg total) by mouth every 8 (eight) hours as needed for Nausea.    ondansetron (ZOFRAN) 8 MG tablet Take 1 tablet (8 mg total) by mouth every 8 (eight) hours as needed for Nausea.    carBAMazepine 200 MG Oral Tab Take 1 tablet (200 mg total) by mouth 2 (two) times daily.    losartan 50 MG Oral Tab Take 1 tablet (50 mg total) by mouth daily.      Diagnoses for This Visit    Malignant neoplasm of central portion of left breast in female, estrogen receptor positive   [[id number]]  -  Primary  Encounter for central line care   [[id number]]             Future Appointments        Provider Department    5/8/2024 10:00 AM [facility] LAB1 [facility] - Infusion    5/8/2024 11:00 AM [facility] INFRN 2 [facility] - Infusion    5/15/2024 3:00 PM Montrell Heaton [facility] Hematology Oncology                Did you know that [facility] [place] primary care physicians now offer Video Visits through [employer] for adult patients for a variety of conditions such as allergies, back pain and cold symptoms? Skip the drive and waiting room and online chat with a doctor face-to-face using your web-cam enabled computer or mobile device wherever you are. Video Visits cost $50 and can be paid hassle-free  using a credit, debit, or health savings card.  Not active on [employer]? Ask us how to get signed up today!          If you receive a survey from [employer], please take a few minutes to complete it and provide feedback. We strive to deliver the best patient experience and are looking for ways to make improvements. Your feedback will help us do so. For more information on [employer], please visit [email or web address]           No text in SmartText           No text in SmartText

## (undated) NOTE — LETTER
[address]  Authorization for Surgical Operation and Procedure                                                                                           I hereby authorize                                       , my physician and his/her assistants (if applicable), which may include medical students, residents, and/or fellows, to perform the following surgical operation/ procedure and administer such anesthesia as may be determined necessary by my physician: Operation/Procedure name (s) Left breast wire localization on Alisia Berg   2. I recognize that during the surgical operation/procedure, unforeseen conditions may necessitate additional or different procedures than those listed above. I, therefore, further authorize and request that the above-named surgeon, assistants, or designees perform such procedures as are, in their judgment, necessary and desirable. 3.   My surgeon/physician has discussed prior to my surgery the potential benefits, risks and side effects of this procedure; the likelihood of achieving goals; and potential problems that might occur during recuperation. They also discussed reasonable alternatives to the procedure, including risks, benefits, and side effects related to the alternatives and risks related to not receiving this procedure. I have had all my questions answered and I acknowledge that no guarantee has been made as to the result that may be obtained. 4.   Should the need arise during my operation/procedure, which includes change of level of care prior to discharge, I also consent to the administration of blood and/or blood products. Further, I understand that despite careful testing and screening of blood or blood products by collecting agencies, I may still be subject to ill effects as a result of receiving a blood transfusion and/or blood products.   The following are some, but not all, of the potential risks that can occur: fever and allergic reactions, hemolytic reactions, transmission of diseases such as Hepatitis, AIDS and Cytomegalovirus (CMV) and fluid overload. In the event that I wish to have an autologous transfusion of my own blood, or a directed donor transfusion, I will discuss this with my physician. Check only if Refusing Blood or Blood Products  I understand refusal of blood or blood products as deemed necessary by my physician may have serious consequences to my condition to include possible death. I hereby assume responsibility for my refusal and release the hospital, its personnel, and my physicians from any responsibility for the consequences of my refusal.    o  Refuse   5. I authorize the use of any specimen, organs, tissues, body parts or foreign objects that may be removed from my body during the operation/procedure for diagnosis, research or teaching purposes and their subsequent disposal by hospital authorities. I also authorize the release of specimen test results and/or written reports to my treating physician on the hospital medical staff or other referring or consulting physicians involved in my care, at the discretion of the Pathologist or my treating physician. 6.   I consent to the photographing or videotaping of the operations or procedures to be performed, including appropriate portions of my body for medical, scientific, or educational purposes, provided my identity is not revealed by the pictures or by descriptive texts accompanying them. If the procedure has been photographed/videotaped, the surgeon will obtain the original picture, image, videotape or CD. The hospital will not be responsible for storage, release or maintenance of the picture, image, tape or CD.    7.   I consent to the presence of a  or observers in the operating room as deemed necessary by my physician or their designees.     8.   I recognize that in the event my procedure results in extended X-Ray/fluoroscopy time, I may develop a skin reaction. 9. If I have a Do Not Attempt Resuscitation (DNAR) order in place, that status will be suspended while in the operating room, procedural suite, and during the recovery period unless otherwise explicitly stated by me (or a person authorized to consent on my behalf). The surgeon or my attending physician will determine when the applicable recovery period ends for purposes of reinstating the DNAR order. 10. Patients having a sterilization procedure: I understand that if the procedure is successful the results will be permanent and it will therefore be impossible for me to inseminate, conceive, or bear children. I also understand that the procedure is intended to result in sterility, although the result has not been guaranteed. 11. I acknowledge that my physician has explained sedation/analgesia administration to me including the risk and benefits I consent to the administration of sedation/analgesia as may be necessary or desirable in the judgment of my physician. I CERTIFY THAT I HAVE READ AND FULLY UNDERSTAND THE ABOVE CONSENT TO OPERATION and/or OTHER PROCEDURE.     _________________________________________ _________________________________     ___________________________________  Signature of Patient     Signature of Responsible Person                   Printed Name of Responsible Person                              _________________________________________ ______________________________        ___________________________________  Signature of Witness         Date  Time         Relationship to Patient    STATEMENT OF PHYSICIAN My signature below affirms that prior to the time of the procedure; I have explained to the patient and/or his/her legal representative, the risks and benefits involved in the proposed treatment and any reasonable alternative to the proposed treatment.  I have also explained the risks and benefits involved in refusal of the proposed treatment and alternatives to the proposed treatment and have answered the patient's questions.  If I have a significant financial interest in a co-management agreement or a significant financial interest in any product or implant, or other significant relationship used in this procedure/surgery, I have disclosed this and had a discussion with my patient.     _______________________________________________________________ _____________________________  Bobby Ritter of Physician)                                                                                         (Date)                                   (Time)  Patient Name: Reny Giordano    : 1975   Printed: 2023      Medical Record #: [id number]                                              Page 1 of 1

## (undated) NOTE — LETTER
[address] [facility] [address] [place], [place]  Authorization for Surgical Operation and Procedure                                                                                           I hereby authorize Melany Benitez MD, my physician and his/her assistants (if applicable), which may include medical students, residents, and/or fellows, to perform the following surgical operation/ procedure and administer such anesthesia as may be determined necessary by my physician: Operation/Procedure name (s) Left breast lumpectomy, left breast axillary lymph node dissection on Alisia Berg   2. I recognize that during the surgical operation/procedure, unforeseen conditions may necessitate additional or different procedures than those listed above. I, therefore, further authorize and request that the above-named surgeon, assistants, or designees perform such procedures as are, in their judgment, necessary and desirable. 3.   My surgeon/physician has discussed prior to my surgery the potential benefits, risks and side effects of this procedure; the likelihood of achieving goals; and potential problems that might occur during recuperation. They also discussed reasonable alternatives to the procedure, including risks, benefits, and side effects related to the alternatives and risks related to not receiving this procedure. I have had all my questions answered and I acknowledge that no guarantee has been made as to the result that may be obtained. 4.   Should the need arise during my operation/procedure, which includes change of level of care prior to discharge, I also consent to the administration of blood and/or blood products. Further, I understand that despite careful testing and screening of blood or blood products by collecting agencies, I may still be subject to ill effects as a result of receiving a blood transfusion and/or blood products.   The following are some, but not all, of the potential risks that can occur: fever and allergic reactions, hemolytic reactions, transmission of diseases such as Hepatitis, AIDS and Cytomegalovirus (CMV) and fluid overload. In the event that I wish to have an autologous transfusion of my own blood, or a directed donor transfusion, I will discuss this with my physician. Check only if Refusing Blood or Blood Products  I understand refusal of blood or blood products as deemed necessary by my physician may have serious consequences to my condition to include possible death. I hereby assume responsibility for my refusal and release the hospital, its personnel, and my physicians from any responsibility for the consequences of my refusal.    o  Refuse   5. I authorize the use of any specimen, organs, tissues, body parts or foreign objects that may be removed from my body during the operation/procedure for diagnosis, research or teaching purposes and their subsequent disposal by hospital authorities. I also authorize the release of specimen test results and/or written reports to my treating physician on the hospital medical staff or other referring or consulting physicians involved in my care, at the discretion of the Pathologist or my treating physician. 6.   I consent to the photographing or videotaping of the operations or procedures to be performed, including appropriate portions of my body for medical, scientific, or educational purposes, provided my identity is not revealed by the pictures or by descriptive texts accompanying them. If the procedure has been photographed/videotaped, the surgeon will obtain the original picture, image, videotape or CD. The hospital will not be responsible for storage, release or maintenance of the picture, image, tape or CD.    7.   I consent to the presence of a  or observers in the operating room as deemed necessary by my physician or their designees.     8.   I recognize that in the event my procedure results in extended X-Ray/fluoroscopy time, I may develop a skin reaction. 9. If I have a Do Not Attempt Resuscitation (DNAR) order in place, that status will be suspended while in the operating room, procedural suite, and during the recovery period unless otherwise explicitly stated by me (or a person authorized to consent on my behalf). The surgeon or my attending physician will determine when the applicable recovery period ends for purposes of reinstating the DNAR order. 10. Patients having a sterilization procedure: I understand that if the procedure is successful the results will be permanent and it will therefore be impossible for me to inseminate, conceive, or bear children. I also understand that the procedure is intended to result in sterility, although the result has not been guaranteed. 11. I acknowledge that my physician has explained sedation/analgesia administration to me including the risk and benefits I consent to the administration of sedation/analgesia as may be necessary or desirable in the judgment of my physician. I CERTIFY THAT I HAVE READ AND FULLY UNDERSTAND THE ABOVE CONSENT TO OPERATION and/or OTHER PROCEDURE.     _________________________________________ _________________________________     ___________________________________  Signature of Patient     Signature of Responsible Person                   Printed Name of Responsible Person                              _________________________________________ ______________________________        ___________________________________  Signature of Witness         Date  Time         Relationship to Patient    STATEMENT OF PHYSICIAN My signature below affirms that prior to the time of the procedure; I have explained to the patient and/or his/her legal representative, the risks and benefits involved in the proposed treatment and any reasonable alternative to the proposed treatment.  I have also explained the risks and benefits involved in refusal of the proposed treatment and alternatives to the proposed treatment and have answered the patient's questions.  If I have a significant financial interest in a co-management agreement or a significant financial interest in any product or implant, or other significant relationship used in this procedure/surgery, I have disclosed this and had a discussion with my patient.     _______________________________________________________________ _____________________________  Brendia Fat of Physician)                                                                                         (Date)                                   (Time)  Patient Name: Talya Benjamin    : 1975   Printed: 2023      Medical Record #: [id number]                                              Page 1 of 1